# Patient Record
Sex: FEMALE | Race: WHITE | NOT HISPANIC OR LATINO
[De-identification: names, ages, dates, MRNs, and addresses within clinical notes are randomized per-mention and may not be internally consistent; named-entity substitution may affect disease eponyms.]

---

## 2017-01-04 ENCOUNTER — RX ONLY (OUTPATIENT)
Age: 42
Setting detail: RX ONLY
End: 2017-01-04

## 2017-01-06 ENCOUNTER — HOSPITAL ENCOUNTER (OUTPATIENT)
Dept: LAB | Facility: MEDICAL CENTER | Age: 42
End: 2017-01-06
Attending: NURSE PRACTITIONER
Payer: COMMERCIAL

## 2017-01-06 LAB
25(OH)D3 SERPL-MCNC: 48 NG/ML (ref 30–100)
ALBUMIN SERPL BCP-MCNC: 4.5 G/DL (ref 3.2–4.9)
ALBUMIN/GLOB SERPL: 1.7 G/DL
ALP SERPL-CCNC: 51 U/L (ref 30–99)
ALT SERPL-CCNC: 41 U/L (ref 2–50)
ANION GAP SERPL CALC-SCNC: 6 MMOL/L (ref 0–11.9)
APPEARANCE UR: CLEAR
AST SERPL-CCNC: 25 U/L (ref 12–45)
BASOPHILS # BLD AUTO: 0.1 K/UL (ref 0–0.12)
BASOPHILS NFR BLD AUTO: 1.2 % (ref 0–1.8)
BILIRUB SERPL-MCNC: 0.6 MG/DL (ref 0.1–1.5)
BILIRUB UR QL STRIP.AUTO: NEGATIVE
BUN SERPL-MCNC: 9 MG/DL (ref 8–22)
CALCIUM SERPL-MCNC: 9.8 MG/DL (ref 8.5–10.5)
CHLORIDE SERPL-SCNC: 103 MMOL/L (ref 96–112)
CHOLEST SERPL-MCNC: 255 MG/DL (ref 100–199)
CO2 SERPL-SCNC: 26 MMOL/L (ref 20–33)
COLOR UR AUTO: COLORLESS
CREAT SERPL-MCNC: 0.77 MG/DL (ref 0.5–1.4)
CULTURE IF INDICATED INDCX: NO UA CULTURE
EOSINOPHIL # BLD: 0.18 K/UL (ref 0–0.51)
EOSINOPHIL NFR BLD AUTO: 2.2 % (ref 0–6.9)
ERYTHROCYTE [DISTWIDTH] IN BLOOD BY AUTOMATED COUNT: 48.6 FL (ref 35.9–50)
GLOBULIN SER CALC-MCNC: 2.7 G/DL (ref 1.9–3.5)
GLUCOSE SERPL-MCNC: 93 MG/DL (ref 65–99)
GLUCOSE UR STRIP.AUTO-MCNC: NEGATIVE MG/DL
HCT VFR BLD AUTO: 44.5 % (ref 37–47)
HDLC SERPL-MCNC: 51 MG/DL
HGB BLD-MCNC: 14.7 G/DL (ref 12–16)
IMM GRANULOCYTES # BLD AUTO: 0.06 K/UL (ref 0–0.11)
IMM GRANULOCYTES NFR BLD AUTO: 0.7 % (ref 0–0.9)
KETONES UR STRIP.AUTO-MCNC: NEGATIVE MG/DL
LDLC SERPL CALC-MCNC: 173 MG/DL
LEUKOCYTE ESTERASE UR QL STRIP.AUTO: NEGATIVE
LYMPHOCYTES # BLD: 2.56 K/UL (ref 1–4.8)
LYMPHOCYTES NFR BLD AUTO: 31.3 % (ref 22–41)
MCH RBC QN AUTO: 30.1 PG (ref 27–33)
MCHC RBC AUTO-ENTMCNC: 33 G/DL (ref 33.6–35)
MCV RBC AUTO: 91 FL (ref 81.4–97.8)
MICRO URNS: NORMAL
MONOCYTES # BLD: 0.77 K/UL (ref 0–0.85)
MONOCYTES NFR BLD AUTO: 9.4 % (ref 0–13.4)
NEUTROPHILS # BLD: 4.52 K/UL (ref 2–7.15)
NEUTROPHILS NFR BLD AUTO: 55.2 % (ref 44–72)
NITRITE UR QL STRIP.AUTO: NEGATIVE
NRBC # BLD AUTO: 0 K/UL
NRBC BLD-RTO: 0 /100 WBC
PH UR: 6.5 [PH]
PLATELET # BLD AUTO: 220 K/UL (ref 164–446)
PMV BLD AUTO: 10.5 FL (ref 9–12.9)
POTASSIUM SERPL-SCNC: 4.1 MMOL/L (ref 3.6–5.5)
PROT SERPL-MCNC: 7.2 G/DL (ref 6–8.2)
PROT UR QL STRIP: NEGATIVE MG/DL
RBC # BLD AUTO: 4.89 M/UL (ref 4.2–5.4)
RBC UR QL AUTO: NEGATIVE
SODIUM SERPL-SCNC: 135 MMOL/L (ref 135–145)
SP GR UR STRIP.AUTO: 1
TRIGL SERPL-MCNC: 157 MG/DL (ref 0–149)
TSH SERPL DL<=0.005 MIU/L-ACNC: 0.72 UIU/ML (ref 0.3–3.7)
WBC # BLD AUTO: 8.2 K/UL (ref 4.8–10.8)

## 2017-01-06 PROCEDURE — 80061 LIPID PANEL: CPT

## 2017-01-06 PROCEDURE — 80053 COMPREHEN METABOLIC PANEL: CPT

## 2017-01-06 PROCEDURE — 85025 COMPLETE CBC W/AUTO DIFF WBC: CPT

## 2017-01-06 PROCEDURE — 82306 VITAMIN D 25 HYDROXY: CPT

## 2017-01-06 PROCEDURE — 81003 URINALYSIS AUTO W/O SCOPE: CPT

## 2017-01-06 PROCEDURE — 84443 ASSAY THYROID STIM HORMONE: CPT

## 2017-01-06 PROCEDURE — 36415 COLL VENOUS BLD VENIPUNCTURE: CPT

## 2017-07-17 ENCOUNTER — HOSPITAL ENCOUNTER (OUTPATIENT)
Dept: RADIOLOGY | Facility: MEDICAL CENTER | Age: 42
End: 2017-07-17
Attending: NURSE PRACTITIONER
Payer: COMMERCIAL

## 2017-07-17 DIAGNOSIS — Z12.31 ENCOUNTER FOR MAMMOGRAM TO ESTABLISH BASELINE MAMMOGRAM: ICD-10-CM

## 2017-07-17 PROCEDURE — 77063 BREAST TOMOSYNTHESIS BI: CPT

## 2017-11-06 ENCOUNTER — HOSPITAL ENCOUNTER (OUTPATIENT)
Dept: LAB | Facility: MEDICAL CENTER | Age: 42
End: 2017-11-06
Attending: SPECIALIST
Payer: COMMERCIAL

## 2017-11-06 LAB
ALBUMIN SERPL BCP-MCNC: 4.3 G/DL (ref 3.2–4.9)
ALBUMIN/GLOB SERPL: 1.5 G/DL
ALP SERPL-CCNC: 52 U/L (ref 30–99)
ALT SERPL-CCNC: 26 U/L (ref 2–50)
ANION GAP SERPL CALC-SCNC: 7 MMOL/L (ref 0–11.9)
AST SERPL-CCNC: 19 U/L (ref 12–45)
BASOPHILS # BLD AUTO: 1.1 % (ref 0–1.8)
BASOPHILS # BLD: 0.09 K/UL (ref 0–0.12)
BILIRUB SERPL-MCNC: 0.6 MG/DL (ref 0.1–1.5)
BUN SERPL-MCNC: 11 MG/DL (ref 8–22)
CALCIUM SERPL-MCNC: 9.4 MG/DL (ref 8.5–10.5)
CHLORIDE SERPL-SCNC: 104 MMOL/L (ref 96–112)
CO2 SERPL-SCNC: 24 MMOL/L (ref 20–33)
CREAT SERPL-MCNC: 0.73 MG/DL (ref 0.5–1.4)
EOSINOPHIL # BLD AUTO: 0.18 K/UL (ref 0–0.51)
EOSINOPHIL NFR BLD: 2.2 % (ref 0–6.9)
ERYTHROCYTE [DISTWIDTH] IN BLOOD BY AUTOMATED COUNT: 45.3 FL (ref 35.9–50)
GFR SERPL CREATININE-BSD FRML MDRD: >60 ML/MIN/1.73 M 2
GLOBULIN SER CALC-MCNC: 2.8 G/DL (ref 1.9–3.5)
GLUCOSE SERPL-MCNC: 109 MG/DL (ref 65–99)
HCT VFR BLD AUTO: 43.4 % (ref 37–47)
HGB BLD-MCNC: 14.4 G/DL (ref 12–16)
IMM GRANULOCYTES # BLD AUTO: 0.03 K/UL (ref 0–0.11)
IMM GRANULOCYTES NFR BLD AUTO: 0.4 % (ref 0–0.9)
LYMPHOCYTES # BLD AUTO: 2 K/UL (ref 1–4.8)
LYMPHOCYTES NFR BLD: 24.9 % (ref 22–41)
MCH RBC QN AUTO: 30.7 PG (ref 27–33)
MCHC RBC AUTO-ENTMCNC: 33.2 G/DL (ref 33.6–35)
MCV RBC AUTO: 92.5 FL (ref 81.4–97.8)
MONOCYTES # BLD AUTO: 0.56 K/UL (ref 0–0.85)
MONOCYTES NFR BLD AUTO: 7 % (ref 0–13.4)
NEUTROPHILS # BLD AUTO: 5.16 K/UL (ref 2–7.15)
NEUTROPHILS NFR BLD: 64.4 % (ref 44–72)
NRBC # BLD AUTO: 0 K/UL
NRBC BLD AUTO-RTO: 0 /100 WBC
PLATELET # BLD AUTO: 229 K/UL (ref 164–446)
PMV BLD AUTO: 11.2 FL (ref 9–12.9)
POTASSIUM SERPL-SCNC: 4.2 MMOL/L (ref 3.6–5.5)
PROT SERPL-MCNC: 7.1 G/DL (ref 6–8.2)
RBC # BLD AUTO: 4.69 M/UL (ref 4.2–5.4)
SODIUM SERPL-SCNC: 135 MMOL/L (ref 135–145)
WBC # BLD AUTO: 8 K/UL (ref 4.8–10.8)

## 2017-11-06 PROCEDURE — 36415 COLL VENOUS BLD VENIPUNCTURE: CPT

## 2017-11-06 PROCEDURE — 80053 COMPREHEN METABOLIC PANEL: CPT

## 2017-11-06 PROCEDURE — 85025 COMPLETE CBC W/AUTO DIFF WBC: CPT

## 2017-11-16 ENCOUNTER — HOSPITAL ENCOUNTER (OUTPATIENT)
Dept: RADIOLOGY | Facility: MEDICAL CENTER | Age: 42
End: 2017-11-16
Attending: SPECIALIST
Payer: COMMERCIAL

## 2017-11-16 DIAGNOSIS — G43.109 MIGRAINE WITH AURA AND WITHOUT STATUS MIGRAINOSUS, NOT INTRACTABLE: ICD-10-CM

## 2017-11-29 ENCOUNTER — HOSPITAL ENCOUNTER (OUTPATIENT)
Dept: RADIOLOGY | Facility: MEDICAL CENTER | Age: 42
End: 2017-11-29
Attending: SPECIALIST
Payer: COMMERCIAL

## 2017-11-29 PROCEDURE — A9579 GAD-BASE MR CONTRAST NOS,1ML: HCPCS | Performed by: SPECIALIST

## 2017-11-29 PROCEDURE — 70553 MRI BRAIN STEM W/O & W/DYE: CPT

## 2017-11-29 PROCEDURE — 700117 HCHG RX CONTRAST REV CODE 255: Performed by: SPECIALIST

## 2017-11-29 RX ADMIN — GADODIAMIDE 20 ML: 287 INJECTION INTRAVENOUS at 10:07

## 2018-01-17 ENCOUNTER — OFFICE VISIT (OUTPATIENT)
Dept: URGENT CARE | Facility: CLINIC | Age: 43
End: 2018-01-17
Payer: COMMERCIAL

## 2018-01-17 VITALS
HEART RATE: 84 BPM | WEIGHT: 230 LBS | BODY MASS INDEX: 34.86 KG/M2 | OXYGEN SATURATION: 97 % | SYSTOLIC BLOOD PRESSURE: 122 MMHG | HEIGHT: 68 IN | DIASTOLIC BLOOD PRESSURE: 76 MMHG | TEMPERATURE: 99 F

## 2018-01-17 DIAGNOSIS — J02.0 STREP THROAT: Primary | ICD-10-CM

## 2018-01-17 LAB
INT CON NEG: NEGATIVE
INT CON POS: POSITIVE
S PYO AG THROAT QL: POSITIVE

## 2018-01-17 PROCEDURE — 99214 OFFICE O/P EST MOD 30 MIN: CPT | Performed by: PHYSICIAN ASSISTANT

## 2018-01-17 PROCEDURE — 87880 STREP A ASSAY W/OPTIC: CPT | Performed by: PHYSICIAN ASSISTANT

## 2018-01-17 RX ORDER — IBUPROFEN 200 MG
400 TABLET ORAL EVERY 6 HOURS PRN
COMMUNITY

## 2018-01-17 RX ORDER — LISINOPRIL 20 MG/1
20 TABLET ORAL DAILY
COMMUNITY
End: 2019-01-23

## 2018-01-17 RX ORDER — AMOXICILLIN 875 MG/1
875 TABLET, COATED ORAL 2 TIMES DAILY
Qty: 20 TAB | Refills: 0 | Status: SHIPPED | OUTPATIENT
Start: 2018-01-17 | End: 2018-01-27

## 2018-01-17 NOTE — PATIENT INSTRUCTIONS
"Strep Throat  Strep throat is an infection of the throat caused by a bacteria named Streptococcus pyogenes. Your health care provider may call the infection streptococcal \"tonsillitis\" or \"pharyngitis\" depending on whether there are signs of inflammation in the tonsils or back of the throat. Strep throat is most common in children aged 5-15 years during the cold months of the year, but it can occur in people of any age during any season. This infection is spread from person to person (contagious) through coughing, sneezing, or other close contact.  SIGNS AND SYMPTOMS   · Fever or chills.  · Painful, swollen, red tonsils or throat.  · Pain or difficulty when swallowing.  · White or yellow spots on the tonsils or throat.  · Swollen, tender lymph nodes or \"glands\" of the neck or under the jaw.  · Red rash all over the body (rare).  DIAGNOSIS   Many different infections can cause the same symptoms. A test must be done to confirm the diagnosis so the right treatment can be given. A \"rapid strep test\" can help your health care provider make the diagnosis in a few minutes. If this test is not available, a light swab of the infected area can be used for a throat culture test. If a throat culture test is done, results are usually available in a day or two.  TREATMENT   Strep throat is treated with antibiotic medicine.  HOME CARE INSTRUCTIONS   · Gargle with 1 tsp of salt in 1 cup of warm water, 3-4 times per day or as needed for comfort.  · Family members who also have a sore throat or fever should be tested for strep throat and treated with antibiotics if they have the strep infection.  · Make sure everyone in your household washes their hands well.  · Do not share food, drinking cups, or personal items that could cause the infection to spread to others.  · You may need to eat a soft food diet until your sore throat gets better.  · Drink enough water and fluids to keep your urine clear or pale yellow. This will help prevent " dehydration.  · Get plenty of rest.  · Stay home from school, day care, or work until you have been on antibiotics for 24 hours.  · Take medicines only as directed by your health care provider.  · Take your antibiotic medicine as directed by your health care provider. Finish it even if you start to feel better.  SEEK MEDICAL CARE IF:   · The glands in your neck continue to enlarge.  · You develop a rash, cough, or earache.  · You cough up green, yellow-brown, or bloody sputum.  · You have pain or discomfort not controlled by medicines.  · Your problems seem to be getting worse rather than better.  · You have a fever.  SEEK IMMEDIATE MEDICAL CARE IF:   · You develop any new symptoms such as vomiting, severe headache, stiff or painful neck, chest pain, shortness of breath, or trouble swallowing.  · You develop severe throat pain, drooling, or changes in your voice.  · You develop swelling of the neck, or the skin on the neck becomes red and tender.  · You develop signs of dehydration, such as fatigue, dry mouth, and decreased urination.  · You become increasingly sleepy, or you cannot wake up completely.  MAKE SURE YOU:  · Understand these instructions.  · Will watch your condition.  · Will get help right away if you are not doing well or get worse.     This information is not intended to replace advice given to you by your health care provider. Make sure you discuss any questions you have with your health care provider.     Document Released: 12/15/2001 Document Revised: 01/08/2016 Document Reviewed: 04/11/2016  [employer] Interactive Patient Education ©2016 [employer]

## 2018-01-17 NOTE — PROGRESS NOTES
Subjective:      Pt is a 42 y.o. female who presents with URI (uri , sore throat x 3 days .)            HPI  PT presents to UC clinic today complaining of sore throat,  pressure in ears, cough, fatigue, runny nose. PT denies CP, SOB, NVD, abdominal pain, joint pain. PT states these symptoms began around 3 days ago. PT states the pain is a 7/10 with swallowing, aching in nature and worse at night. Pt has not taken any RX medications for this condition. The pt's medication list, problem list, and allergies have been evaluated and reviewed during today's visit.      PMH:  Past Medical History:   Diagnosis Date   • Bowel habit changes     diarrhea since end of May 2016 evaluted by primary and having a workup   • Heart burn    • High cholesterol    • Hypertension        PSH:  Past Surgical History:   Procedure Laterality Date   • SEPTOPLASTY Bilateral 6/17/2016    Procedure: SEPTOPLASTY;  Surgeon: Argelia Cheng M.D.;  Location: [facility];  Service:    • TURBINOPLASTY Bilateral 6/17/2016    Procedure: TURBINOPLASTY;  Surgeon: Argelia Cehng M.D.;  Location: [facility];  Service:    • ANTROSTOMY Bilateral 6/17/2016    Procedure: ANTROSTOMY - ENDOSCOPIC MAXILLARY;  Surgeon: Argelia Cheng M.D.;  Location: [facility];  Service:    • PRIMARY C SECTION  11/30/2011    Performed by DANIA PERSAUD at [facility]   • TONSILLECTOMY  1991       Fam Hx:  the patient's family history is not pertinent to their current complaint      Soc HX:  Social History     Social History   • Marital status:      Spouse name: N/A   • Number of children: N/A   • Years of education: N/A     Occupational History   • Not on file.     Social History Main Topics   • Smoking status: Never Smoker   • Smokeless tobacco: Never Used   • Alcohol use Yes      Comment: 4 per week   • Drug use: No   • Sexual activity: Not on file     Other Topics Concern   • Not on file     Social History  "Narrative   • No narrative on file         Medications:    Current Outpatient Prescriptions:   •  lisinopril (PRINIVIL) 20 MG Tab, Take 20 mg by mouth every day., Disp: , Rfl:   •  Nortriptyline HCl (PAMELOR PO), Take  by mouth., Disp: , Rfl:   •  ibuprofen (MOTRIN) 200 MG Tab, Take 200 mg by mouth every 6 hours as needed., Disp: , Rfl:   •  Multiple Vitamin (MULTI-VITAMIN PO), Take  by mouth., Disp: , Rfl:   •  Levonorgestrel (MIRENA, 52 MG, IU), by Intrauterine route., Disp: , Rfl:   •  amoxicillin (AMOXIL) 875 MG tablet, Take 1 Tab by mouth 2 times a day for 10 days., Disp: 20 Tab, Rfl: 0  •  labetalol (NORMODYNE) 100 MG Tab, Take 100 mg by mouth 2 times a day., Disp: , Rfl:   •  Cholecalciferol (VITAMIN D3) 5000 UNITS Cap, Take 1 Cap by mouth every day., Disp: , Rfl:   •  omeprazole (PRILOSEC) 20 MG delayed-release capsule, Take 20 mg by mouth every evening., Disp: , Rfl:   •  Cetirizine HCl (ZYRTEC PO), Take  by mouth every day., Disp: , Rfl:   •  Biotin 5000 MCG Cap, Take  by mouth every day., Disp: , Rfl:       Allergies:  Codeine    ROS   Constitutional: Positive for malaise/fatigue.   HENT: Positive for congestion and sore throat. Negative for ear pain.    Eyes: Negative for blurred vision, double vision and photophobia.   Respiratory: Negative for cough, hemoptysis, shortness of breath and wheezing.    Cardiovascular: Negative for chest pain and palpitations.   Gastrointestinal: Negative for nausea, vomiting, abdominal pain, diarrhea and constipation.   Genitourinary: Negative for dysuria and flank pain.   Musculoskeletal: Negative for falls and myalgias.   Skin: Negative for itching and rash.   Neurological:  Negative for dizziness and tingling.   Endo/Heme/Allergies: Does not bruise/bleed easily.   Psychiatric/Behavioral: Negative for depression. The patient is not nervous/anxious.               Objective:     /76   Pulse 84   Temp 37.2 °C (99 °F)   Ht 1.727 m (5' 8\")   Wt 104.3 kg (230 lb)   " LMP 01/17/2017   SpO2 97%   BMI 34.97 kg/m²      Physical Exam      Constitutional: PT is oriented to person, place, and time. PT appears well-developed and well-nourished. No distress.   HENT:   Head: Normocephalic and atraumatic.   Right Ear: Hearing, tympanic membrane, external ear and ear canal normal.   Left Ear: Hearing, tympanic membrane, external ear and ear canal normal.   Nose: Mucosal edema, rhinorrhea and sinus tenderness present. Right sinus exhibits frontal sinus tenderness. Left sinus exhibits frontal sinus tenderness.   Mouth/Throat: Uvula is midline. Mucous membranes are pale. Posterior oropharyngeal edema and posterior oropharyngeal erythema present. No oropharyngeal exudate.   Eyes: Conjunctivae normal and EOM are normal. Pupils are equal, round, and reactive to light.   Neck: Normal range of motion. Neck supple. No thyromegaly present.   Cardiovascular: Normal rate, regular rhythm, normal heart sounds and intact distal pulses.  Exam reveals no gallop and no friction rub.    No murmur heard.  Pulmonary/Chest: Effort normal and breath sounds normal. No respiratory distress. PT has no wheezes. PT has no rales. PT exhibits no tenderness.   Abdominal: Soft. Bowel sounds are normal. PT exhibits no distension and no mass. There is no tenderness. There is no rebound and no guarding.   Musculoskeletal: Normal range of motion. PT exhibits no edema and no tenderness.   Lymphadenopathy:     PT has no cervical adenopathy.   Neurological: PT is alert and oriented to person, place, and time. PT displays normal reflexes. No cranial nerve deficit. PT exhibits normal muscle tone. Coordination normal.   Skin: Skin is warm and dry. No rash noted. No erythema.   Psychiatric: PT has a normal mood and affect. PT behavior is normal. Judgment and thought content normal.          Assessment/Plan:     1. Strep throat    - amoxicillin (AMOXIL) 875 MG tablet; Take 1 Tab by mouth 2 times a day for 10 days.  Dispense: 20  Tab; Refill: 0  - POCT Rapid Strep A-->POS A    Rest, fluids encouraged.  OTC decongestant for congestion  Note given for work.  AVS with medical info given.  Pt was in full understanding and agreement with the plan.  Follow-up as needed if symptoms worsen or fail to improve.

## 2018-03-28 ENCOUNTER — OFFICE VISIT (OUTPATIENT)
Dept: NEUROLOGY | Facility: MEDICAL CENTER | Age: 43
End: 2018-03-28
Payer: COMMERCIAL

## 2018-03-28 VITALS
TEMPERATURE: 97.3 F | HEIGHT: 68 IN | DIASTOLIC BLOOD PRESSURE: 70 MMHG | WEIGHT: 249.78 LBS | BODY MASS INDEX: 37.86 KG/M2 | HEART RATE: 98 BPM | SYSTOLIC BLOOD PRESSURE: 120 MMHG

## 2018-03-28 PROCEDURE — 99204 OFFICE O/P NEW MOD 45 MIN: CPT | Performed by: PHYSICIAN ASSISTANT

## 2018-03-28 RX ORDER — NORTRIPTYLINE HYDROCHLORIDE 10 MG/1
10 CAPSULE ORAL 2 TIMES DAILY
Qty: 60 CAP | Refills: 0 | COMMUNITY
Start: 2018-03-28 | End: 2018-06-18

## 2018-03-28 NOTE — PROGRESS NOTES
"Subjective:      Reta Lam is a 42 y.o. female who presents with New Patient (Migraines)    Chief Complaint/Reason for referral:  headaches    History of Present Illness:   Describe your headaches to me:  Migraines since around age 20.  For awhile they thought it was jaw related but she now has a  etc and it didn't help much.    Bilateral onset, moderate to severe intensity, lasting more than 4 hours untreated, not necessarily worsened by activity but then she says \"it's just bad all the time.\"    Do you get an aura or any symptoms that typically begin PRIOR to headache onset?  Not generally    Are you nauseated or sick to your stomach when you have a headache?  y  Does light bother you when you have a headache?   ___y______  Does sound or noise bother you when you have a headache?   __n_______    Neurologic symptoms with headaches (weakness, numbness, vertigo, speech changes, cognitive changes)  none      Do you have any neck or jaw pain with headaches?    No neck injuries    Jaw addressed above    Have you identified any triggers for your headaches (dehydration, poor sleep, low blood sugar, alcohol 35% (germán wine) chocolate 22%, cheese 9%, citrus fruit 11%)? No triggers identified    Do you feel restless like you want to pace around with your headaches or do you feel like lying down to make your headache feel better/less severe?  Lie down    Do headaches start by coughing, sneezing, bending over, Valsalva maneuver, sexual activity?  none    Do headaches start shortly after you lie down to go to bed or shortly after you get up from bed in the morning? Typically come on in the mornings    Have you noticed a menstrual pattern to your headaches? No menses but she has her ovaries    Have you ever kept a headache diary?    How many days do you keep ANY type of headache in any given month?  3 or fewer days______   Between 3 and 6 days______   Between 6 and 10 days_____  Between 11 and 14 " days____  15 or more headache/days per month_X____  Has severe headaches __0__ days per month    Family members with headaches:  none    Co--morbid conditions:    Conditions that affect diagnosis and treatment:  Depression  N        Anxiety     N       Sleep disorders:   N       Obesity  Yes - dilated eye exam in December was fine    History of TBI N            Pregnancy/family planning   N  Counseled on teratogenicity of migraine medications.  Patient verbalized their understanding.    Fibromyalgia   N    Social History:  Do you drink any caffeine? Yes___X__ No____   How many days per week?  2 or fewer days______  3 or more days__X____    Do you drink alcohol?  Yes -     Do you use recreational drugs including medicinal marijuana?  no    Do you smoke cigarettes? 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  none    Who and where do you live?  [place] with  daughter age 6    What is your exercise program:  Walking about 2-3 days per week    Have you had an MRI done?   Yes - normal    PMH reviewed -   No asthma, no kidney stones, no mi, no stroke, no mood stabilizer    Medications and Allergies Reviewed     Lisinopril - sometimes helps with headache too      What are you taking right now for your headaches/#days per month of acute medication use:     Sumatriptan 100 mg        Prior acute treatments:  Medication/dose/timing/route/worked or side-effects?    none    What are you taking right now - daily - to prevent headaches?/dose    pamelor - much better with headaches now      Any prior prophylactic treatments:  Medications/dose/frequency/duration of treatment/worked or side effects?    topamax 25 mg daily - could not tolerate due to feeling too foggy                                                                                                                   HPI    ROS       Objective:     /70   Pulse 98    "Temp 36.3 °C (97.3 °F)   Ht 1.727 m (5' 8\")   Wt 113.3 kg (249 lb 12.5 oz)   BMI 37.98 kg/m²      Physical Exam   Constitutional: She is oriented to person, place, and time. She appears well-developed and well-nourished.   obese   HENT:   Head: Normocephalic.   Eyes: EOM are normal. Pupils are equal, round, and reactive to light.   Pulmonary/Chest: Effort normal.   Neurological: She is alert and oriented to person, place, and time. She displays normal reflexes. No cranial nerve deficit or sensory deficit. She exhibits normal muscle tone. Coordination normal.   Skin: Skin is warm and dry.   Psychiatric: She has a normal mood and affect. Her behavior is normal. Judgment and thought content normal.                   Assessment/Plan:     Migraine without aura/chronic migraine    Triptan - sumatriptan 100 mg prn    Daily Preventative Treatment:  Vitamins - handout provided - suggest she start one  Daily medicine - continue pamelor 20 mg daily              "

## 2018-06-18 RX ORDER — NORTRIPTYLINE HYDROCHLORIDE 10 MG/1
10 CAPSULE ORAL 4 TIMES DAILY
Qty: 120 CAP | Refills: 3 | Status: SHIPPED | OUTPATIENT
Start: 2018-06-18 | End: 2018-06-29

## 2018-06-26 RX ORDER — DEXAMETHASONE 4 MG/1
TABLET ORAL
Qty: 6 TAB | Refills: 1 | Status: SHIPPED | OUTPATIENT
Start: 2018-06-26 | End: 2019-01-23

## 2018-06-29 RX ORDER — NORTRIPTYLINE HYDROCHLORIDE 25 MG/1
25 CAPSULE ORAL 4 TIMES DAILY
Qty: 120 CAP | Refills: 11 | Status: SHIPPED | OUTPATIENT
Start: 2018-06-29 | End: 2019-01-23

## 2018-07-20 ENCOUNTER — HOSPITAL ENCOUNTER (OUTPATIENT)
Dept: RADIOLOGY | Facility: MEDICAL CENTER | Age: 43
End: 2018-07-20
Attending: OBSTETRICS & GYNECOLOGY
Payer: COMMERCIAL

## 2018-07-20 DIAGNOSIS — Z12.31 VISIT FOR SCREENING MAMMOGRAM: ICD-10-CM

## 2018-07-20 PROCEDURE — 77067 SCR MAMMO BI INCL CAD: CPT

## 2018-10-24 ENCOUNTER — APPOINTMENT (RX ONLY)
Dept: URBAN - METROPOLITAN AREA CLINIC 4 | Facility: CLINIC | Age: 43
Setting detail: DERMATOLOGY
End: 2018-10-24

## 2018-10-24 DIAGNOSIS — Z71.89 OTHER SPECIFIED COUNSELING: ICD-10-CM

## 2018-10-24 DIAGNOSIS — L30.9 DERMATITIS, UNSPECIFIED: ICD-10-CM

## 2018-10-24 PROBLEM — I10 ESSENTIAL (PRIMARY) HYPERTENSION: Status: ACTIVE | Noted: 2018-10-24

## 2018-10-24 PROBLEM — L57.0 ACTINIC KERATOSIS: Status: ACTIVE | Noted: 2018-10-24

## 2018-10-24 PROCEDURE — ? PRESCRIPTION

## 2018-10-24 PROCEDURE — ? MEDICATION COUNSELING

## 2018-10-24 PROCEDURE — 99212 OFFICE O/P EST SF 10 MIN: CPT

## 2018-10-24 PROCEDURE — ? ADDITIONAL NOTES

## 2018-10-24 PROCEDURE — ? COUNSELING

## 2018-10-24 PROCEDURE — ? DIAGNOSIS COMMENT

## 2018-10-24 RX ORDER — NYSTATIN AND TRIAMCINOLONE ACETONIDE 100000; 1 [USP'U]/G; MG/G
OINTMENT TOPICAL
Qty: 1 | Refills: 3 | Status: ERX | COMMUNITY
Start: 2018-10-24

## 2018-10-24 RX ADMIN — NYSTATIN AND TRIAMCINOLONE ACETONIDE: 100000; 1 OINTMENT TOPICAL at 00:00

## 2018-10-24 ASSESSMENT — LOCATION DETAILED DESCRIPTION DERM
LOCATION DETAILED: RIGHT AXILLARY VAULT
LOCATION DETAILED: LEFT AXILLARY VAULT
LOCATION DETAILED: STERNAL NOTCH

## 2018-10-24 ASSESSMENT — LOCATION SIMPLE DESCRIPTION DERM
LOCATION SIMPLE: CHEST
LOCATION SIMPLE: LEFT AXILLARY VAULT
LOCATION SIMPLE: RIGHT AXILLARY VAULT

## 2018-10-24 ASSESSMENT — LOCATION ZONE DERM
LOCATION ZONE: TRUNK
LOCATION ZONE: AXILLAE

## 2018-10-24 NOTE — HPI: RASH
What Type Of Note Output Would You Prefer (Optional)?: Standard Output
How Severe Is Your Rash?: moderate
Is This A New Presentation, Or A Follow-Up?: Rash
Additional History: Patient has had this previously 2-3 times in the past couple of years. She was given Nystatin from [facility] when this was first present 7 years ago, this did clear it up. She has tried the Nystatin now which just made it burn. She also tried neosporin and Aquaphor.

## 2018-10-24 NOTE — PROCEDURE: MEDICATION COUNSELING
Spironolactone Counseling: Patient advised regarding risks of diarrhea, abdominal pain, hyperkalemia, birth defects (for female patients), liver toxicity and renal toxicity. The patient may need blood work to monitor liver and kidney function and potassium levels while on therapy. The patient verbalized understanding of the proper use and possible adverse effects of spironolactone.  All of the patient's questions and concerns were addressed.
Enbrel Counseling:  I discussed with the patient the risks of etanercept including but not limited to myelosuppression, immunosuppression, autoimmune hepatitis, demyelinating diseases, lymphoma, and infections.  The patient understands that monitoring is required including a PPD at baseline and must alert us or the primary physician if symptoms of infection or other concerning signs are noted.
Cimzia Pregnancy And Lactation Text: This medication crosses the placenta but can be considered safe in certain situations. Cimzia may be excreted in breast milk.
Imiquimod Counseling:  I discussed with the patient the risks of imiquimod including but not limited to erythema, scaling, itching, weeping, crusting, and pain.  Patient understands that the inflammatory response to imiquimod is variable from person to person and was educated regarded proper titration schedule.  If flu-like symptoms develop, patient knows to discontinue the medication and contact us.
Stelara Pregnancy And Lactation Text: This medication is Pregnancy Category B and is considered safe during pregnancy. It is unknown if this medication is excreted in breast milk.
Benzoyl Peroxide Pregnancy And Lactation Text: This medication is Pregnancy Category C. It is unknown if benzoyl peroxide is excreted in breast milk.
Tazorac Pregnancy And Lactation Text: This medication is not safe during pregnancy. It is unknown if this medication is excreted in breast milk.
High Dose Vitamin A Pregnancy And Lactation Text: High dose vitamin A therapy is contraindicated during pregnancy and breast feeding.
SSKI Counseling:  I discussed with the patient the risks of SSKI including but not limited to thyroid abnormalities, metallic taste, GI upset, fever, headache, acne, arthralgias, paraesthesias, lymphadenopathy, easy bleeding, arrhythmias, and allergic reaction.
Protopic Counseling: Patient may experience a mild burning sensation during topical application. Protopic is not approved in children less than 2 years of age. There have been case reports of hematologic and skin malignancies in patients using topical calcineurin inhibitors although causality is questionable.
Cellcept Pregnancy And Lactation Text: This medication is Pregnancy Category D and isn't considered safe during pregnancy. It is unknown if this medication is excreted in breast milk.
Taltz Counseling: I discussed with the patient the risks of ixekizumab including but not limited to immunosuppression, serious infections, worsening of inflammatory bowel disease and drug reactions.  The patient understands that monitoring is required including a PPD at baseline and must alert us or the primary physician if symptoms of infection or other concerning signs are noted.
Tremfya Pregnancy And Lactation Text: The risk during pregnancy and breastfeeding is uncertain with this medication.
Hydroxychloroquine Pregnancy And Lactation Text: This medication has been shown to cause fetal harm but it isn't assigned a Pregnancy Risk Category. There are small amounts excreted in breast milk.
Cyclophosphamide Pregnancy And Lactation Text: This medication is Pregnancy Category D and it isn't considered safe during pregnancy. This medication is excreted in breast milk.
Carac Counseling:  I discussed with the patient the risks of Carac including but not limited to erythema, scaling, itching, weeping, crusting, and pain.
Cyclosporine Pregnancy And Lactation Text: This medication is Pregnancy Category C and it isn't know if it is safe during pregnancy. This medication is excreted in breast milk.
Infliximab Counseling:  I discussed with the patient the risks of infliximab including but not limited to myelosuppression, immunosuppression, autoimmune hepatitis, demyelinating diseases, lymphoma, and serious infections.  The patient understands that monitoring is required including a PPD at baseline and must alert us or the primary physician if symptoms of infection or other concerning signs are noted.
Dupixent Counseling: I discussed with the patient the risks of dupilumab including but not limited to eye infection and irritation, cold sores, injection site reactions, worsening of asthma, allergic reactions and increased risk of parasitic infection.  Live vaccines should be avoided while taking dupilumab. Dupilumab will also interact with certain medications such as warfarin and cyclosporine. The patient understands that monitoring is required and they must alert us or the primary physician if symptoms of infection or other concerning signs are noted.
Cimetidine Counseling:  I discussed with the patient the risks of Cimetidine including but not limited to gynecomastia, headache, diarrhea, nausea, drowsiness, arrhythmias, pancreatitis, skin rashes, psychosis, bone marrow suppression and kidney toxicity.
Zyclara Pregnancy And Lactation Text: This medication is Pregnancy Category C. It is unknown if this medication is excreted in breast milk.
Ketoconazole Pregnancy And Lactation Text: This medication is Pregnancy Category C and it isn't know if it is safe during pregnancy. It is also excreted in breast milk and breast feeding isn't recommended.
Ivermectin Counseling:  Patient instructed to take medication on an empty stomach with a full glass of water.  Patient informed of potential adverse effects including but not limited to nausea, diarrhea, dizziness, itching, and swelling of the extremities or lymph nodes.  The patient verbalized understanding of the proper use and possible adverse effects of ivermectin.  All of the patient's questions and concerns were addressed.
Rifampin Counseling: I discussed with the patient the risks of rifampin including but not limited to liver damage, kidney damage, red-orange body fluids, nausea/vomiting and severe allergy.
Dapsone Counseling: I discussed with the patient the risks of dapsone including but not limited to hemolytic anemia, agranulocytosis, rashes, methemoglobinemia, kidney failure, peripheral neuropathy, headaches, GI upset, and liver toxicity.  Patients who start dapsone require monitoring including baseline LFTs and weekly CBCs for the first month, then every month thereafter.  The patient verbalized understanding of the proper use and possible adverse effects of dapsone.  All of the patient's questions and concerns were addressed.
Nsaids Pregnancy And Lactation Text: These medications are considered safe up to 30 weeks gestation. It is excreted in breast milk.
Dapsone Pregnancy And Lactation Text: This medication is Pregnancy Category C and is not considered safe during pregnancy or breast feeding.
Rituxan Pregnancy And Lactation Text: This medication is Pregnancy Category C and it isn't know if it is safe during pregnancy. It is unknown if this medication is excreted in breast milk but similar antibodies are known to be excreted.
Gabapentin Pregnancy And Lactation Text: This medication is Pregnancy Category C and isn't considered safe during pregnancy. It is excreted in breast milk.
Otezla Counseling: The side effects of Otezla were discussed with the patient, including but not limited to worsening or new depression, weight loss, diarrhea, nausea, upper respiratory tract infection, and headache. Patient instructed to call the office should any adverse effect occur.  The patient verbalized understanding of the proper use and possible adverse effects of Otezla.  All the patient's questions and concerns were addressed.
Eucrisa Pregnancy And Lactation Text: This medication has not been assigned a Pregnancy Risk Category but animal studies failed to show danger with the topical medication. It is unknown if the medication is excreted in breast milk.
Odomzo Pregnancy And Lactation Text: This medication is Pregnancy Category X and is absolutely contraindicated during pregnancy. It is unknown if it is excreted in breast milk.
Minocycline Counseling: Patient advised regarding possible photosensitivity and discoloration of the teeth, skin, lips, tongue and gums.  Patient instructed to avoid sunlight, if possible.  When exposed to sunlight, patients should wear protective clothing, sunglasses, and sunscreen.  The patient was instructed to call the office immediately if the following severe adverse effects occur:  hearing changes, easy bruising/bleeding, severe headache, or vision changes.  The patient verbalized understanding of the proper use and possible adverse effects of minocycline.  All of the patient's questions and concerns were addressed.
Rifampin Pregnancy And Lactation Text: This medication is Pregnancy Category C and it isn't know if it is safe during pregnancy. It is also excreted in breast milk and should not be used if you are breast feeding.
Valtrex Pregnancy And Lactation Text: this medication is Pregnancy Category B and is considered safe during pregnancy. This medication is not directly found in breast milk but it's metabolite acyclovir is present.
Terbinafine Pregnancy And Lactation Text: This medication is Pregnancy Category B and is considered safe during pregnancy. It is also excreted in breast milk and breast feeding isn't recommended.
Itraconazole Counseling:  I discussed with the patient the risks of itraconazole including but not limited to liver damage, nausea/vomiting, neuropathy, and severe allergy.  The patient understands that this medication is best absorbed when taken with acidic beverages such as non-diet cola or ginger ale.  The patient understands that monitoring is required including baseline LFTs and repeat LFTs at intervals.  The patient understands that they are to contact us or the primary physician if concerning signs are noted.
Methotrexate Pregnancy And Lactation Text: This medication is Pregnancy Category X and is known to cause fetal harm. This medication is excreted in breast milk.
Albendazole Counseling:  I discussed with the patient the risks of albendazole including but not limited to cytopenia, kidney damage, nausea/vomiting and severe allergy.  The patient understands that this medication is being used in an off-label manner.
Cephalexin Pregnancy And Lactation Text: This medication is Pregnancy Category B and considered safe during pregnancy.  It is also excreted in breast milk but can be used safely for shorter doses.
Drysol Counseling:  I discussed with the patient the risks of drysol/aluminum chloride including but not limited to skin rash, itching, irritation, burning.
Eucrisa Counseling: Patient may experience a mild burning sensation during topical application. Eucrisa is not approved in children less than 2 years of age.
Itraconazole Pregnancy And Lactation Text: This medication is Pregnancy Category C and it isn't know if it is safe during pregnancy. It is also excreted in breast milk.
Cimzia Counseling:  I discussed with the patient the risks of Cimzia including but not limited to immunosuppression, allergic reactions and infections.  The patient understands that monitoring is required including a PPD at baseline and must alert us or the primary physician if symptoms of infection or other concerning signs are noted.
Prednisone Counseling:  I discussed with the patient the risks of prolonged use of prednisone including but not limited to weight gain, insomnia, osteoporosis, mood changes, diabetes, susceptibility to infection, glaucoma and high blood pressure.  In cases where prednisone use is prolonged, patients should be monitored with blood pressure checks, serum glucose levels and an eye exam.  Additionally, the patient may need to be placed on GI prophylaxis, PCP prophylaxis, and calcium and vitamin D supplementation and/or a bisphosphonate.  The patient verbalized understanding of the proper use and the possible adverse effects of prednisone.  All of the patient's questions and concerns were addressed.
Bactrim Pregnancy And Lactation Text: This medication is Pregnancy Category D and is known to cause fetal risk.  It is also excreted in breast milk.
Cosentyx Counseling:  I discussed with the patient the risks of Cosentyx including but not limited to worsening of Crohn's disease, immunosuppression, allergic reactions and infections.  The patient understands that monitoring is required including a PPD at baseline and must alert us or the primary physician if symptoms of infection or other concerning signs are noted.
Protopic Pregnancy And Lactation Text: This medication is Pregnancy Category C. It is unknown if this medication is excreted in breast milk when applied topically.
Topical Sulfur Applications Counseling: Topical Sulfur Counseling: Patient counseled that this medication may cause skin irritation or allergic reactions.  In the event of skin irritation, the patient was advised to reduce the amount of the drug applied or use it less frequently.   The patient verbalized understanding of the proper use and possible adverse effects of topical sulfur application.  All of the patient's questions and concerns were addressed.
Solaraze Pregnancy And Lactation Text: This medication is Pregnancy Category B and is considered safe. There is some data to suggest avoiding during the third trimester. It is unknown if this medication is excreted in breast milk.
Metronidazole Counseling:  I discussed with the patient the risks of metronidazole including but not limited to seizures, nausea/vomiting, a metallic taste in the mouth, nausea/vomiting and severe allergy.
Siliq Counseling:  I discussed with the patient the risks of Siliq including but not limited to new or worsening depression, suicidal thoughts and behavior, immunosuppression, malignancy, posterior leukoencephalopathy syndrome, and serious infections.  The patient understands that monitoring is required including a PPD at baseline and must alert us or the primary physician if symptoms of infection or other concerning signs are noted. There is also a special program designed to monitor depression which is required with Siliq.
Xolair Pregnancy And Lactation Text: This medication is Pregnancy Category B and is considered safe during pregnancy. This medication is excreted in breast milk.
Doxepin Pregnancy And Lactation Text: This medication is Pregnancy Category C and it isn't known if it is safe during pregnancy. It is also excreted in breast milk and breast feeding isn't recommended.
5-Fu Pregnancy And Lactation Text: This medication is Pregnancy Category X and contraindicated in pregnancy and in women who may become pregnant. It is unknown if this medication is excreted in breast milk.
Hydroquinone Counseling:  Patient advised that medication may result in skin irritation, lightening (hypopigmentation), dryness, and burning.  In the event of skin irritation, the patient was advised to reduce the amount of the drug applied or use it less frequently.  Rarely, spots that are treated with hydroquinone can become darker (pseudoochronosis).  Should this occur, patient instructed to stop medication and call the office. The patient verbalized understanding of the proper use and possible adverse effects of hydroquinone.  All of the patient's questions and concerns were addressed.
Zyclara Counseling:  I discussed with the patient the risks of imiquimod including but not limited to erythema, scaling, itching, weeping, crusting, and pain.  Patient understands that the inflammatory response to imiquimod is variable from person to person and was educated regarded proper titration schedule.  If flu-like symptoms develop, patient knows to discontinue the medication and contact us.
Erythromycin Counseling:  I discussed with the patient the risks of erythromycin including but not limited to GI upset, allergic reaction, drug rash, diarrhea, increase in liver enzymes, and yeast infections.
High Dose Vitamin A Counseling: Side effects reviewed, pt to contact office should one occur.
Include Pregnancy/Lactation Warning?: No
Azithromycin Pregnancy And Lactation Text: This medication is considered safe during pregnancy and is also secreted in breast milk.
Picato Counseling:  I discussed with the patient the risks of Picato including but not limited to erythema, scaling, itching, weeping, crusting, and pain.
Dupixent Pregnancy And Lactation Text: This medication likely crosses the placenta but the risk for the fetus is uncertain. This medication is excreted in breast milk.
Bexarotene Pregnancy And Lactation Text: This medication is Pregnancy Category X and should not be given to women who are pregnant or may become pregnant. This medication should not be used if you are breast feeding.
Hydroxyzine Pregnancy And Lactation Text: This medication is not safe during pregnancy and should not be taken. It is also excreted in breast milk and breast feeding isn't recommended.
Terbinafine Counseling: Patient counseling regarding adverse effects of terbinafine including but not limited to headache, diarrhea, rash, upset stomach, liver function test abnormalities, itching, taste/smell disturbance, nausea, abdominal pain, and flatulence.  There is a rare possibility of liver failure that can occur when taking terbinafine.  The patient understands that a baseline LFT and kidney function test may be required. The patient verbalized understanding of the proper use and possible adverse effects of terbinafine.  All of the patient's questions and concerns were addressed.
Birth Control Pills Counseling: Birth Control Pill Counseling: I discussed with the patient the potential side effects of OCPs including but not limited to increased risk of stroke, heart attack, thrombophlebitis, deep venous thrombosis, hepatic adenomas, breast changes, GI upset, headaches, and depression.  The patient verbalized understanding of the proper use and possible adverse effects of OCPs. All of the patient's questions and concerns were addressed.
Stelara Counseling:  I discussed with the patient the risks of ustekinumab including but not limited to immunosuppression, malignancy, posterior leukoencephalopathy syndrome, and serious infections.  The patient understands that monitoring is required including a PPD at baseline and must alert us or the primary physician if symptoms of infection or other concerning signs are noted.
Benzoyl Peroxide Counseling: Patient counseled that medicine may cause skin irritation and bleach clothing.  In the event of skin irritation, the patient was advised to reduce the amount of the drug applied or use it less frequently.   The patient verbalized understanding of the proper use and possible adverse effects of benzoyl peroxide.  All of the patient's questions and concerns were addressed.
Arava Counseling:  Patient counseled regarding adverse effects of Arava including but not limited to nausea, vomiting, abnormalities in liver function tests. Patients may develop mouth sores, rash, diarrhea, and abnormalities in blood counts. The patient understands that monitoring is required including LFTs and blood counts.  There is a rare possibility of scarring of the liver and lung problems that can occur when taking methotrexate. Persistent nausea, loss of appetite, pale stools, dark urine, cough, and shortness of breath should be reported immediately. Patient advised to discontinue Arava treatment and consult with a physician prior to attempting conception. The patient will have to undergo a treatment to eliminate Arava from the body prior to conception.
Spironolactone Pregnancy And Lactation Text: This medication can cause feminization of the male fetus and should be avoided during pregnancy. The active metabolite is also found in breast milk.
Nsaids Counseling: NSAID Counseling: I discussed with the patient that NSAIDs should be taken with food. Prolonged use of NSAIDs can result in the development of stomach ulcers.  Patient advised to stop taking NSAIDs if abdominal pain occurs.  The patient verbalized understanding of the proper use and possible adverse effects of NSAIDs.  All of the patient's questions and concerns were addressed.
Clindamycin Counseling: I counseled the patient regarding use of clindamycin as an antibiotic for prophylactic and/or therapeutic purposes. Clindamycin is active against numerous classes of bacteria, including skin bacteria. Side effects may include nausea, diarrhea, gastrointestinal upset, rash, hives, yeast infections, and in rare cases, colitis.
Topical Sulfur Applications Pregnancy And Lactation Text: This medication is Pregnancy Category C and has an unknown safety profile during pregnancy. It is unknown if this topical medication is excreted in breast milk.
Acitretin Pregnancy And Lactation Text: This medication is Pregnancy Category X and should not be given to women who are pregnant or may become pregnant in the future. This medication is excreted in breast milk.
Griseofulvin Pregnancy And Lactation Text: This medication is Pregnancy Category X and is known to cause serious birth defects. It is unknown if this medication is excreted in breast milk but breast feeding should be avoided.
Humira Counseling:  I discussed with the patient the risks of adalimumab including but not limited to myelosuppression, immunosuppression, autoimmune hepatitis, demyelinating diseases, lymphoma, and serious infections.  The patient understands that monitoring is required including a PPD at baseline and must alert us or the primary physician if symptoms of infection or other concerning signs are noted.
Cyclosporine Counseling:  I discussed with the patient the risks of cyclosporine including but not limited to hypertension, gingival hyperplasia,myelosuppression, immunosuppression, liver damage, kidney damage, neurotoxicity, lymphoma, and serious infections. The patient understands that monitoring is required including baseline blood pressure, CBC, CMP, lipid panel and uric acid, and then 1-2 times monthly CMP and blood pressure.
Ketoconazole Counseling:   Patient counseled regarding improving absorption with orange juice.  Adverse effects include but are not limited to breast enlargement, headache, diarrhea, nausea, upset stomach, liver function test abnormalities, taste disturbance, and stomach pain.  There is a rare possibility of liver failure that can occur when taking ketoconazole. The patient understands that monitoring of LFTs may be required, especially at baseline. The patient verbalized understanding of the proper use and possible adverse effects of ketoconazole.  All of the patient's questions and concerns were addressed.
Azithromycin Counseling:  I discussed with the patient the risks of azithromycin including but not limited to GI upset, allergic reaction, drug rash, diarrhea, and yeast infections.
Glycopyrrolate Pregnancy And Lactation Text: This medication is Pregnancy Category B and is considered safe during pregnancy. It is unknown if it is excreted breast milk.
Topical Clindamycin Counseling: Patient counseled that this medication may cause skin irritation or allergic reactions.  In the event of skin irritation, the patient was advised to reduce the amount of the drug applied or use it less frequently.   The patient verbalized understanding of the proper use and possible adverse effects of clindamycin.  All of the patient's questions and concerns were addressed.
Albendazole Pregnancy And Lactation Text: This medication is Pregnancy Category C and it isn't known if it is safe during pregnancy. It is also excreted in breast milk.
Doxycycline Counseling:  Patient counseled regarding possible photosensitivity and increased risk for sunburn.  Patient instructed to avoid sunlight, if possible.  When exposed to sunlight, patients should wear protective clothing, sunglasses, and sunscreen.  The patient was instructed to call the office immediately if the following severe adverse effects occur:  hearing changes, easy bruising/bleeding, severe headache, or vision changes.  The patient verbalized understanding of the proper use and possible adverse effects of doxycycline.  All of the patient's questions and concerns were addressed.
Rituxan Counseling:  I discussed with the patient the risks of Rituxan infusions. Side effects can include infusion reactions, severe drug rashes including mucocutaneous reactions, reactivation of latent hepatitis and other infections and rarely progressive multifocal leukoencephalopathy.  All of the patient's questions and concerns were addressed.
Birth Control Pills Pregnancy And Lactation Text: This medication should be avoided if pregnant and for the first 30 days post-partum.
Erivedge Counseling- I discussed with the patient the risks of Erivedge including but not limited to nausea, vomiting, diarrhea, constipation, weight loss, changes in the sense of taste, decreased appetite, muscle spasms, and hair loss.  The patient verbalized understanding of the proper use and possible adverse effects of Erivedge.  All of the patient's questions and concerns were addressed.
Griseofulvin Counseling:  I discussed with the patient the risks of griseofulvin including but not limited to photosensitivity, cytopenia, liver damage, nausea/vomiting and severe allergy.  The patient understands that this medication is best absorbed when taken with a fatty meal (e.g., ice cream or french fries).
Colchicine Counseling:  Patient counseled regarding adverse effects including but not limited to stomach upset (nausea, vomiting, stomach pain, or diarrhea).  Patient instructed to limit alcohol consumption while taking this medication.  Colchicine may reduce blood counts especially with prolonged use.  The patient understands that monitoring of kidney function and blood counts may be required, especially at baseline. The patient verbalized understanding of the proper use and possible adverse effects of colchicine.  All of the patient's questions and concerns were addressed.
Methotrexate Counseling:  Patient counseled regarding adverse effects of methotrexate including but not limited to nausea, vomiting, abnormalities in liver function tests. Patients may develop mouth sores, rash, diarrhea, and abnormalities in blood counts. The patient understands that monitoring is required including LFT's and blood counts.  There is a rare possibility of scarring of the liver and lung problems that can occur when taking methotrexate. Persistent nausea, loss of appetite, pale stools, dark urine, cough, and shortness of breath should be reported immediately. Patient advised to discontinue methotrexate treatment at least three months before attempting to become pregnant.  I discussed the need for folate supplements while taking methotrexate.  These supplements can decrease side effects during methotrexate treatment. The patient verbalized understanding of the proper use and possible adverse effects of methotrexate.  All of the patient's questions and concerns were addressed.
Clofazimine Counseling:  I discussed with the patient the risks of clofazimine including but not limited to skin and eye pigmentation, liver damage, nausea/vomiting, gastrointestinal bleeding and allergy.
Tetracycline Pregnancy And Lactation Text: This medication is Pregnancy Category D and not consider safe during pregnancy. It is also excreted in breast milk.
Topical Retinoid counseling:  Patient advised to apply a pea-sized amount only at bedtime and wait 30 minutes after washing their face before applying.  If too drying, patient may add a non-comedogenic moisturizer. The patient verbalized understanding of the proper use and possible adverse effects of retinoids.  All of the patient's questions and concerns were addressed.
Odomzo Counseling- I discussed with the patient the risks of Odomzo including but not limited to nausea, vomiting, diarrhea, constipation, weight loss, changes in the sense of taste, decreased appetite, muscle spasms, and hair loss.  The patient verbalized understanding of the proper use and possible adverse effects of Odomzo.  All of the patient's questions and concerns were addressed.
Xeljanz Counseling: I discussed with the patient the risks of Xeljanz therapy including increased risk of infection, liver issues, headache, diarrhea, or cold symptoms. Live vaccines should be avoided. They were instructed to call if they have any problems.
Xolair Counseling:  Patient informed of potential adverse effects including but not limited to fever, muscle aches, rash and allergic reactions.  The patient verbalized understanding of the proper use and possible adverse effects of Xolair.  All of the patient's questions and concerns were addressed.
Isotretinoin Counseling: Patient should get monthly blood tests, not donate blood, not drive at night if vision affected, not share medication, and not undergo elective surgery for 6 months after tx completed. Side effects reviewed, pt to contact office should one occur.
Solaraze Counseling:  I discussed with the patient the risks of Solaraze including but not limited to erythema, scaling, itching, weeping, crusting, and pain.
Tazorac Counseling:  Patient advised that medication is irritating and drying.  Patient may need to apply sparingly and wash off after an hour before eventually leaving it on overnight.  The patient verbalized understanding of the proper use and possible adverse effects of tazorac.  All of the patient's questions and concerns were addressed.
Oxybutynin Counseling:  I discussed with the patient the risks of oxybutynin including but not limited to skin rash, drowsiness, dry mouth, difficulty urinating, and blurred vision.
Cephalexin Counseling: I counseled the patient regarding use of cephalexin as an antibiotic for prophylactic and/or therapeutic purposes. Cephalexin (commonly prescribed under brand name Keflex) is a cephalosporin antibiotic which is active against numerous classes of bacteria, including most skin bacteria. Side effects may include nausea, diarrhea, gastrointestinal upset, rash, hives, yeast infections, and in rare cases, hepatitis, kidney disease, seizures, fever, confusion, neurologic symptoms, and others. Patients with severe allergies to penicillin medications are cautioned that there is about a 10% incidence of cross-reactivity with cephalosporins. When possible, patients with penicillin allergies should use alternatives to cephalosporins for antibiotic therapy.
Bactrim Counseling:  I discussed with the patient the risks of sulfa antibiotics including but not limited to GI upset, allergic reaction, drug rash, diarrhea, dizziness, photosensitivity, and yeast infections.  Rarely, more serious reactions can occur including but not limited to aplastic anemia, agranulocytosis, methemoglobinemia, blood dyscrasias, liver or kidney failure, lung infiltrates or desquamative/blistering drug rashes.
Tetracycline Counseling: Patient counseled regarding possible photosensitivity and increased risk for sunburn.  Patient instructed to avoid sunlight, if possible.  When exposed to sunlight, patients should wear protective clothing, sunglasses, and sunscreen.  The patient was instructed to call the office immediately if the following severe adverse effects occur:  hearing changes, easy bruising/bleeding, severe headache, or vision changes.  The patient verbalized understanding of the proper use and possible adverse effects of tetracycline.  All of the patient's questions and concerns were addressed. Patient understands to avoid pregnancy while on therapy due to potential birth defects.
Azathioprine Counseling:  I discussed with the patient the risks of azathioprine including but not limited to myelosuppression, immunosuppression, hepatotoxicity, lymphoma, and infections.  The patient understands that monitoring is required including baseline LFTs, Creatinine, possible TPMP genotyping and weekly CBCs for the first month and then every 2 weeks thereafter.  The patient verbalized understanding of the proper use and possible adverse effects of azathioprine.  All of the patient's questions and concerns were addressed.
Bexarotene Counseling:  I discussed with the patient the risks of bexarotene including but not limited to hair loss, dry lips/skin/eyes, liver abnormalities, hyperlipidemia, pancreatitis, depression/suicidal ideation, photosensitivity, drug rash/allergic reactions, hypothyroidism, anemia, leukopenia, infection, cataracts, and teratogenicity.  Patient understands that they will need regular blood tests to check lipid profile, liver function tests, white blood cell count, thyroid function tests and pregnancy test if applicable.
Tremfya Counseling: I discussed with the patient the risks of guselkumab including but not limited to immunosuppression, serious infections, worsening of inflammatory bowel disease and drug reactions.  The patient understands that monitoring is required including a PPD at baseline and must alert us or the primary physician if symptoms of infection or other concerning signs are noted.
Detail Level: Simple
Drysol Pregnancy And Lactation Text: This medication is considered safe during pregnancy and breast feeding.
Quinolones Counseling:  I discussed with the patient the risks of fluoroquinolones including but not limited to GI upset, allergic reaction, drug rash, diarrhea, dizziness, photosensitivity, yeast infections, liver function test abnormalities, tendonitis/tendon rupture.
Elidel Counseling: Patient may experience a mild burning sensation during topical application. Elidel is not approved in children less than 2 years of age. There have been case reports of hematologic and skin malignancies in patients using topical calcineurin inhibitors although causality is questionable.
Doxepin Counseling:  Patient advised that the medication is sedating and not to drive a car after taking this medication. Patient informed of potential adverse effects including but not limited to dry mouth, urinary retention, and blurry vision.  The patient verbalized understanding of the proper use and possible adverse effects of doxepin.  All of the patient's questions and concerns were addressed.
Erythromycin Pregnancy And Lactation Text: This medication is Pregnancy Category B and is considered safe during pregnancy. It is also excreted in breast milk.
Isotretinoin Pregnancy And Lactation Text: This medication is Pregnancy Category X and is considered extremely dangerous during pregnancy. It is unknown if it is excreted in breast milk.
Sski Pregnancy And Lactation Text: This medication is Pregnancy Category D and isn't considered safe during pregnancy. It is excreted in breast milk.
Glycopyrrolate Counseling:  I discussed with the patient the risks of glycopyrrolate including but not limited to skin rash, drowsiness, dry mouth, difficulty urinating, and blurred vision.
Acitretin Counseling:  I discussed with the patient the risks of acitretin including but not limited to hair loss, dry lips/skin/eyes, liver damage, hyperlipidemia, depression/suicidal ideation, photosensitivity.  Serious rare side effects can include but are not limited to pancreatitis, pseudotumor cerebri, bony changes, clot formation/stroke/heart attack.  Patient understands that alcohol is contraindicated since it can result in liver toxicity and significantly prolong the elimination of the drug by many years.
Gabapentin Counseling: I discussed with the patient the risks of gabapentin including but not limited to dizziness, somnolence, fatigue and ataxia.
Thalidomide Counseling: I discussed with the patient the risks of thalidomide including but not limited to birth defects, anxiety, weakness, chest pain, dizziness, cough and severe allergy.
Hydroxychloroquine Counseling:  I discussed with the patient that a baseline ophthalmologic exam is needed at the start of therapy and every year thereafter while on therapy. A CBC may also be warranted for monitoring.  The side effects of this medication were discussed with the patient, including but not limited to agranulocytosis, aplastic anemia, seizures, rashes, retinopathy, and liver toxicity. Patient instructed to call the office should any adverse effect occur.  The patient verbalized understanding of the proper use and possible adverse effects of Plaquenil.  All the patient's questions and concerns were addressed.
Minoxidil Counseling: Minoxidil is a topical medication which can increase blood flow where it is applied. It is uncertain how this medication increases hair growth. Side effects are uncommon and include stinging and allergic reactions.
Doxycycline Pregnancy And Lactation Text: This medication is Pregnancy Category D and not consider safe during pregnancy. It is also excreted in breast milk but is considered safe for shorter treatment courses.
Topical Clindamycin Pregnancy And Lactation Text: This medication is Pregnancy Category B and is considered safe during pregnancy. It is unknown if it is excreted in breast milk.
Otezla Pregnancy And Lactation Text: This medication is Pregnancy Category C and it isn't known if it is safe during pregnancy. It is unknown if it is excreted in breast milk.
Cyclophosphamide Counseling:  I discussed with the patient the risks of cyclophosphamide including but not limited to hair loss, hormonal abnormalities, decreased fertility, abdominal pain, diarrhea, nausea and vomiting, bone marrow suppression and infection. The patient understands that monitoring is required while taking this medication.
Xelkenaz Pregnancy And Lactation Text: This medication is Pregnancy Category D and is not considered safe during pregnancy.  The risk during breast feeding is also uncertain.
Clindamycin Pregnancy And Lactation Text: This medication can be used in pregnancy if certain situations. Clindamycin is also present in breast milk.
Valtrex Counseling: I discussed with the patient the risks of valacyclovir including but not limited to kidney damage, nausea, vomiting and severe allergy.  The patient understands that if the infection seems to be worsening or is not improving, they are to call.
Fluconazole Counseling:  Patient counseled regarding adverse effects of fluconazole including but not limited to headache, diarrhea, nausea, upset stomach, liver function test abnormalities, taste disturbance, and stomach pain.  There is a rare possibility of liver failure that can occur when taking fluconazole.  The patient understands that monitoring of LFTs and kidney function test may be required, especially at baseline. The patient verbalized understanding of the proper use and possible adverse effects of fluconazole.  All of the patient's questions and concerns were addressed.
Simponi Counseling:  I discussed with the patient the risks of golimumab including but not limited to myelosuppression, immunosuppression, autoimmune hepatitis, demyelinating diseases, lymphoma, and serious infections.  The patient understands that monitoring is required including a PPD at baseline and must alert us or the primary physician if symptoms of infection or other concerning signs are noted.
5-Fu Counseling: 5-Fluorouracil Counseling:  I discussed with the patient the risks of 5-fluorouracil including but not limited to erythema, scaling, itching, weeping, crusting, and pain.
Metronidazole Pregnancy And Lactation Text: This medication is Pregnancy Category B and considered safe during pregnancy.  It is also excreted in breast milk.
Cellcept Counseling:  I discussed with the patient the risks of mycophenolate mofetil including but not limited to infection/immunosuppression, GI upset, hypokalemia, hypercholesterolemia, bone marrow suppression, lymphoproliferative disorders, malignancy, GI ulceration/bleed/perforation, colitis, interstitial lung disease, kidney failure, progressive multifocal leukoencephalopathy, and birth defects.  The patient understands that monitoring is required including a baseline creatinine and regular CBC testing. In addition, patient must alert us immediately if symptoms of infection or other concerning signs are noted.
Hydroxyzine Counseling: Patient advised that the medication is sedating and not to drive a car after taking this medication.  Patient informed of potential adverse effects including but not limited to dry mouth, urinary retention, and blurry vision.  The patient verbalized understanding of the proper use and possible adverse effects of hydroxyzine.  All of the patient's questions and concerns were addressed.

## 2018-10-24 NOTE — PROCEDURE: ADDITIONAL NOTES
Detail Level: Zone
Additional Notes: Patient advised to discontinue use of current deodorant on the affected area. D/C neosporin\\nKeep area clean with gentle nonfragranced cleanser\\nFragrance free and dye free protocol discussed

## 2018-10-25 ENCOUNTER — TELEPHONE (OUTPATIENT)
Dept: NEUROLOGY | Facility: MEDICAL CENTER | Age: 43
End: 2018-10-25

## 2018-10-25 NOTE — TELEPHONE ENCOUNTER
----- Message from Reta Lam sent at 10/23/2018  8:38 PM PDT -----  Regarding: RE: Prescription Question  Contact: [phone]  Aniket Palacios!  Thanks so much for getting back to me! I tried to call yesterday but  the voicemail said if appointments are needed, to call the main number. I wasn’t able to call! I was thinking though, when Linnette and I were communicating in the summer, she indicated she would change my medication to a cousin of Pamelor. I think you can see those emails? I can!     If Linnette absolutely wants me to come in, I will, but I wondered if all of our communication in the  summer would jog her memory about next steps? If I need to come in, I’m hoping not to wait too long for an appointment! I know how busy your practice is!!    Thanks again! My cell phone number [phone], if you want to talk!   Reta   ----- Message -----  From: Aram Weaver Ass't  Sent: 10/22/2018  9:26 AM PDT  To: Reta Lam  Subject: RE: Prescription Question  Good morning Reta,    I am sorry to hear this, Since your symptoms fail to improve you will have to schedule a follow up visit with Millicent to discuss other options medication wise. Give me a call today at [phone] so we can set you up for an appointment.    Thank you  Suzanne    ----- Message -----     From: Reta Lam     Sent: 10/21/2018  9:48 PM PDT       To: Millicent Mcclain P.A.-C.  Subject: Prescription Question    Aniket Ricks!  Hope you are well! I increased my Pamelor to 75 mg, per our last email exchanges at the end of July. Unfortunately, my migraines have been getting much worse. I have been taking way too much Advil and excesderin and have had to take a few Imitrex, which I know isn’t good. And unfortunately it doesn’t really help.     So, I’m wondering if I need to switch medications?  Thanks for any help you can offer. I would be happy to come in too, if you think I need to!  Thanks so much!!!  Reta

## 2018-10-29 ENCOUNTER — HOSPITAL ENCOUNTER (OUTPATIENT)
Dept: LAB | Facility: MEDICAL CENTER | Age: 43
End: 2018-10-29
Attending: OTOLARYNGOLOGY
Payer: COMMERCIAL

## 2018-10-29 LAB
AMBIGUOUS DTTM AMBI4: NORMAL
GRAM STN SPEC: NORMAL
SIGNIFICANT IND 70042: NORMAL
SIGNIFICANT IND 70042: NORMAL
SITE SITE: NORMAL
SITE SITE: NORMAL
SOURCE SOURCE: NORMAL
SOURCE SOURCE: NORMAL

## 2018-10-29 PROCEDURE — 87070 CULTURE OTHR SPECIMN AEROBIC: CPT

## 2018-10-29 PROCEDURE — 87075 CULTR BACTERIA EXCEPT BLOOD: CPT

## 2018-10-29 PROCEDURE — 87205 SMEAR GRAM STAIN: CPT

## 2018-10-31 LAB
BACTERIA WND AEROBE CULT: NORMAL
GRAM STN SPEC: NORMAL
SIGNIFICANT IND 70042: NORMAL
SITE SITE: NORMAL
SOURCE SOURCE: NORMAL

## 2018-11-01 LAB
BACTERIA SPEC ANAEROBE CULT: NORMAL
SIGNIFICANT IND 70042: NORMAL
SITE SITE: NORMAL
SOURCE SOURCE: NORMAL

## 2018-11-07 ENCOUNTER — OFFICE VISIT (OUTPATIENT)
Dept: NEUROLOGY | Facility: MEDICAL CENTER | Age: 43
End: 2018-11-07
Payer: COMMERCIAL

## 2018-11-07 VITALS
TEMPERATURE: 97.3 F | BODY MASS INDEX: 38.42 KG/M2 | OXYGEN SATURATION: 96 % | DIASTOLIC BLOOD PRESSURE: 74 MMHG | WEIGHT: 253.5 LBS | SYSTOLIC BLOOD PRESSURE: 132 MMHG | HEART RATE: 112 BPM | HEIGHT: 68 IN

## 2018-11-07 PROCEDURE — 99214 OFFICE O/P EST MOD 30 MIN: CPT | Performed by: PHYSICIAN ASSISTANT

## 2018-11-07 RX ORDER — PROPRANOLOL HYDROCHLORIDE 20 MG/1
20 TABLET ORAL 2 TIMES DAILY
Qty: 60 TAB | Refills: 3 | Status: SHIPPED | OUTPATIENT
Start: 2018-11-07 | End: 2019-01-17

## 2018-11-07 RX ORDER — SUMATRIPTAN 20 MG/1
1 SPRAY NASAL PRN
Qty: 9 EACH | Refills: 10 | Status: SHIPPED | OUTPATIENT
Start: 2018-11-07 | End: 2019-01-23

## 2018-11-07 ASSESSMENT — PATIENT HEALTH QUESTIONNAIRE - PHQ9: CLINICAL INTERPRETATION OF PHQ2 SCORE: 0

## 2018-11-07 NOTE — PROGRESS NOTES
"Cc:  Migraine without aura/chronic migraine follow up     Pt last seen six months ago which was the initial appt.  At that time:    How many days do you keep ANY type of headache in any given month?  3 or fewer days______   Between 3 and 6 days______   Between 6 and 10 days_____  Between 11 and 14 days____  15 or more headache/days per month_X____  Has severe headaches __0__ days per month    PMH reviewed -   No asthma, no kidney stones, no mi, no stroke, no mood stabilizer    What are you taking right now for your headaches/#days per month of acute medication use:      Sumatriptan 100 mg           Prior acute treatments:  Medication/dose/timing/route/worked or side-effects?     none     What are you taking right now - daily - to prevent headaches?/dose     pamelor - much better with headaches now        Any prior prophylactic treatments:  Medications/dose/frequency/duration of treatment/worked or side effects?     topamax 25 mg daily - could not tolerate due to feeling too foggy    Plan at last visit:    Triptan - sumatriptan 100 mg prn     Daily Preventative Treatment:  Vitamins - handout provided - suggest she start one  Daily medicine - continue pamelor 20 mg daily      Interval History:    10/21 pt contacted me via [employer].  She increased her pamelor up to 75 mg and it doesn't seem to be working.  She wants to switch medication.  She was advised by Suzanne that she needed to make a follow up to discuss options.    Pt says she has now had a daily headache for months and is taking medication daily.  Reminded pt of medication overuse headache. Pt said a friend is now on CGRP med and she wants that because \"botox makes me nervous\" or asks to switch  to a cousin drug to pamelor.  We discussed risks associated with new drugs vs botox which has known side effects.  Also notified that in most cases insurance has same requirements to approve cgrp as botox and she has not yet taken a beta blocker.  Since her c/o decreased " effectiveness than it would likely not be of much help to switch from pamelor to elavil.    She has already tried and failed topamax.    BB -   We can try and add this on and see if she responds to this.  Pt also appears to need her rescue restarted as it is not listed on her current medications anymore.    Overusing medications that are OTC.  She is now back onto overusing daily medications otc.  Pamelor just doesn't seem to be working as well.  Also stopped taking any supplements    Plan:  Chronic migraine/medication overuse headache    Go back on a supplement - handout provided    Acute treatment:   Sumatriptan nasal spray will be sent to pharmacy.  Alternative if too expensive will be to try maxalt melt.    [facility] will get authorization and they will call you to schedule    Prevention:  Continue pamelor 75 mg at night - we will try propranol which she will take twice daily.  This is a Beta blocker.    Total time with this visit:  30   Minutes face-to-face with patient. More than 50% of this visit was spent educating patient on their illness and/or coordinating care, as detailed above

## 2018-11-07 NOTE — PATIENT INSTRUCTIONS
Go back on a supplement - handout provided    Acute treatment:   Sumatriptan nasal spray will be sent to pharmacy.  Alternative if too expensive will be to try maxalt melt.    [facility] will get authorization and they will call you to schedule    Prevention:  Continue pamelor 75 mg at night - we will try propranol which she will take twice daily.  This is a Beta blocker.

## 2018-11-14 ENCOUNTER — APPOINTMENT (RX ONLY)
Dept: URBAN - METROPOLITAN AREA CLINIC 4 | Facility: CLINIC | Age: 43
Setting detail: DERMATOLOGY
End: 2018-11-14

## 2018-11-14 DIAGNOSIS — L30.9 DERMATITIS, UNSPECIFIED: ICD-10-CM

## 2018-11-14 DIAGNOSIS — Z71.89 OTHER SPECIFIED COUNSELING: ICD-10-CM

## 2018-11-14 PROCEDURE — ? COUNSELING

## 2018-11-14 PROCEDURE — ? DIAGNOSIS COMMENT

## 2018-11-14 PROCEDURE — ? MEDICATION COUNSELING

## 2018-11-14 PROCEDURE — ? ADDITIONAL NOTES

## 2018-11-14 PROCEDURE — 99212 OFFICE O/P EST SF 10 MIN: CPT

## 2018-11-14 ASSESSMENT — LOCATION DETAILED DESCRIPTION DERM
LOCATION DETAILED: LEFT AXILLARY VAULT
LOCATION DETAILED: RIGHT AXILLARY VAULT
LOCATION DETAILED: STERNAL NOTCH

## 2018-11-14 ASSESSMENT — LOCATION ZONE DERM
LOCATION ZONE: AXILLAE
LOCATION ZONE: TRUNK

## 2018-11-14 ASSESSMENT — LOCATION SIMPLE DESCRIPTION DERM
LOCATION SIMPLE: CHEST
LOCATION SIMPLE: LEFT AXILLARY VAULT
LOCATION SIMPLE: RIGHT AXILLARY VAULT

## 2018-11-14 NOTE — PROCEDURE: MEDICATION COUNSELING
Terbinafine Pregnancy And Lactation Text: This medication is Pregnancy Category B and is considered safe during pregnancy. It is also excreted in breast milk and breast feeding isn't recommended.
Topical Sulfur Applications Pregnancy And Lactation Text: This medication is Pregnancy Category C and has an unknown safety profile during pregnancy. It is unknown if this topical medication is excreted in breast milk.
Elidel Counseling: Patient may experience a mild burning sensation during topical application. Elidel is not approved in children less than 2 years of age. There have been case reports of hematologic and skin malignancies in patients using topical calcineurin inhibitors although causality is questionable.
Metronidazole Pregnancy And Lactation Text: This medication is Pregnancy Category B and considered safe during pregnancy.  It is also excreted in breast milk.
Cosentyx Pregnancy And Lactation Text: This medication is Pregnancy Category B and is considered safe during pregnancy. It is unknown if this medication is excreted in breast milk.
Solaraze Counseling:  I discussed with the patient the risks of Solaraze including but not limited to erythema, scaling, itching, weeping, crusting, and pain.
Detail Level: Zone
Glycopyrrolate Pregnancy And Lactation Text: This medication is Pregnancy Category B and is considered safe during pregnancy. It is unknown if it is excreted breast milk.
Albendazole Counseling:  I discussed with the patient the risks of albendazole including but not limited to cytopenia, kidney damage, nausea/vomiting and severe allergy.  The patient understands that this medication is being used in an off-label manner.
Valtrex Counseling: I discussed with the patient the risks of valacyclovir including but not limited to kidney damage, nausea, vomiting and severe allergy.  The patient understands that if the infection seems to be worsening or is not improving, they are to call.
Rituxan Counseling:  I discussed with the patient the risks of Rituxan infusions. Side effects can include infusion reactions, severe drug rashes including mucocutaneous reactions, reactivation of latent hepatitis and other infections and rarely progressive multifocal leukoencephalopathy.  All of the patient's questions and concerns were addressed.
Prednisone Pregnancy And Lactation Text: This medication is Pregnancy Category C and it isn't know if it is safe during pregnancy. This medication is excreted in breast milk.
Clofazimine Pregnancy And Lactation Text: This medication is Pregnancy Category C and isn't considered safe during pregnancy. It is excreted in breast milk.
Oxybutynin Counseling:  I discussed with the patient the risks of oxybutynin including but not limited to skin rash, drowsiness, dry mouth, difficulty urinating, and blurred vision.
Cellcept Counseling:  I discussed with the patient the risks of mycophenolate mofetil including but not limited to infection/immunosuppression, GI upset, hypokalemia, hypercholesterolemia, bone marrow suppression, lymphoproliferative disorders, malignancy, GI ulceration/bleed/perforation, colitis, interstitial lung disease, kidney failure, progressive multifocal leukoencephalopathy, and birth defects.  The patient understands that monitoring is required including a baseline creatinine and regular CBC testing. In addition, patient must alert us immediately if symptoms of infection or other concerning signs are noted.
Drysol Pregnancy And Lactation Text: This medication is considered safe during pregnancy and breast feeding.
Cosentyx Counseling:  I discussed with the patient the risks of Cosentyx including but not limited to worsening of Crohn's disease, immunosuppression, allergic reactions and infections.  The patient understands that monitoring is required including a PPD at baseline and must alert us or the primary physician if symptoms of infection or other concerning signs are noted.
Topical Sulfur Applications Counseling: Topical Sulfur Counseling: Patient counseled that this medication may cause skin irritation or allergic reactions.  In the event of skin irritation, the patient was advised to reduce the amount of the drug applied or use it less frequently.   The patient verbalized understanding of the proper use and possible adverse effects of topical sulfur application.  All of the patient's questions and concerns were addressed.
Bactrim Pregnancy And Lactation Text: This medication is Pregnancy Category D and is known to cause fetal risk.  It is also excreted in breast milk.
Taltz Pregnancy And Lactation Text: The risk during pregnancy and breastfeeding is uncertain with this medication.
Protopic Pregnancy And Lactation Text: This medication is Pregnancy Category C. It is unknown if this medication is excreted in breast milk when applied topically.
Solaraze Pregnancy And Lactation Text: This medication is Pregnancy Category B and is considered safe. There is some data to suggest avoiding during the third trimester. It is unknown if this medication is excreted in breast milk.
Acitretin Counseling:  I discussed with the patient the risks of acitretin including but not limited to hair loss, dry lips/skin/eyes, liver damage, hyperlipidemia, depression/suicidal ideation, photosensitivity.  Serious rare side effects can include but are not limited to pancreatitis, pseudotumor cerebri, bony changes, clot formation/stroke/heart attack.  Patient understands that alcohol is contraindicated since it can result in liver toxicity and significantly prolong the elimination of the drug by many years.
Rituxan Pregnancy And Lactation Text: This medication is Pregnancy Category C and it isn't know if it is safe during pregnancy. It is unknown if this medication is excreted in breast milk but similar antibodies are known to be excreted.
Valtrex Pregnancy And Lactation Text: this medication is Pregnancy Category B and is considered safe during pregnancy. This medication is not directly found in breast milk but it's metabolite acyclovir is present.
Minocycline Counseling: Patient advised regarding possible photosensitivity and discoloration of the teeth, skin, lips, tongue and gums.  Patient instructed to avoid sunlight, if possible.  When exposed to sunlight, patients should wear protective clothing, sunglasses, and sunscreen.  The patient was instructed to call the office immediately if the following severe adverse effects occur:  hearing changes, easy bruising/bleeding, severe headache, or vision changes.  The patient verbalized understanding of the proper use and possible adverse effects of minocycline.  All of the patient's questions and concerns were addressed.
Dupixent Counseling: I discussed with the patient the risks of dupilumab including but not limited to eye infection and irritation, cold sores, injection site reactions, worsening of asthma, allergic reactions and increased risk of parasitic infection.  Live vaccines should be avoided while taking dupilumab. Dupilumab will also interact with certain medications such as warfarin and cyclosporine. The patient understands that monitoring is required and they must alert us or the primary physician if symptoms of infection or other concerning signs are noted.
Elidel Pregnancy And Lactation Text: This medication is Pregnancy Category C. It is unknown if this medication is excreted in breast milk.
Wartpeel Counseling:  I discussed with the patient the risks of Wartpeel including but not limited to erythema, scaling, itching, weeping, crusting, and pain.
Cellcept Pregnancy And Lactation Text: This medication is Pregnancy Category D and isn't considered safe during pregnancy. It is unknown if this medication is excreted in breast milk.
Cephalexin Counseling: I counseled the patient regarding use of cephalexin as an antibiotic for prophylactic and/or therapeutic purposes. Cephalexin (commonly prescribed under brand name Keflex) is a cephalosporin antibiotic which is active against numerous classes of bacteria, including most skin bacteria. Side effects may include nausea, diarrhea, gastrointestinal upset, rash, hives, yeast infections, and in rare cases, hepatitis, kidney disease, seizures, fever, confusion, neurologic symptoms, and others. Patients with severe allergies to penicillin medications are cautioned that there is about a 10% incidence of cross-reactivity with cephalosporins. When possible, patients with penicillin allergies should use alternatives to cephalosporins for antibiotic therapy.
Albendazole Pregnancy And Lactation Text: This medication is Pregnancy Category C and it isn't known if it is safe during pregnancy. It is also excreted in breast milk.
Hydroxychloroquine Counseling:  I discussed with the patient that a baseline ophthalmologic exam is needed at the start of therapy and every year thereafter while on therapy. A CBC may also be warranted for monitoring.  The side effects of this medication were discussed with the patient, including but not limited to agranulocytosis, aplastic anemia, seizures, rashes, retinopathy, and liver toxicity. Patient instructed to call the office should any adverse effect occur.  The patient verbalized understanding of the proper use and possible adverse effects of Plaquenil.  All the patient's questions and concerns were addressed.
Tremfya Counseling: I discussed with the patient the risks of guselkumab including but not limited to immunosuppression, serious infections, worsening of inflammatory bowel disease and drug reactions.  The patient understands that monitoring is required including a PPD at baseline and must alert us or the primary physician if symptoms of infection or other concerning signs are noted.
Colchicine Counseling:  Patient counseled regarding adverse effects including but not limited to stomach upset (nausea, vomiting, stomach pain, or diarrhea).  Patient instructed to limit alcohol consumption while taking this medication.  Colchicine may reduce blood counts especially with prolonged use.  The patient understands that monitoring of kidney function and blood counts may be required, especially at baseline. The patient verbalized understanding of the proper use and possible adverse effects of colchicine.  All of the patient's questions and concerns were addressed.
Oxybutynin Pregnancy And Lactation Text: This medication is Pregnancy Category B and is considered safe during pregnancy. It is unknown if it is excreted in breast milk.
Fluconazole Counseling:  Patient counseled regarding adverse effects of fluconazole including but not limited to headache, diarrhea, nausea, upset stomach, liver function test abnormalities, taste disturbance, and stomach pain.  There is a rare possibility of liver failure that can occur when taking fluconazole.  The patient understands that monitoring of LFTs and kidney function test may be required, especially at baseline. The patient verbalized understanding of the proper use and possible adverse effects of fluconazole.  All of the patient's questions and concerns were addressed.
Acitretin Pregnancy And Lactation Text: This medication is Pregnancy Category X and should not be given to women who are pregnant or may become pregnant in the future. This medication is excreted in breast milk.
Minocycline Pregnancy And Lactation Text: This medication is Pregnancy Category D and not consider safe during pregnancy. It is also excreted in breast milk.
Dapsone Counseling: I discussed with the patient the risks of dapsone including but not limited to hemolytic anemia, agranulocytosis, rashes, methemoglobinemia, kidney failure, peripheral neuropathy, headaches, GI upset, and liver toxicity.  Patients who start dapsone require monitoring including baseline LFTs and weekly CBCs for the first month, then every month thereafter.  The patient verbalized understanding of the proper use and possible adverse effects of dapsone.  All of the patient's questions and concerns were addressed.
Cyclophosphamide Pregnancy And Lactation Text: This medication is Pregnancy Category D and it isn't considered safe during pregnancy. This medication is excreted in breast milk.
Eucrisa Counseling: Patient may experience a mild burning sensation during topical application. Eucrisa is not approved in children less than 2 years of age.
Birth Control Pills Pregnancy And Lactation Text: This medication should be avoided if pregnant and for the first 30 days post-partum.
Hydroxychloroquine Pregnancy And Lactation Text: This medication has been shown to cause fetal harm but it isn't assigned a Pregnancy Risk Category. There are small amounts excreted in breast milk.
Griseofulvin Counseling:  I discussed with the patient the risks of griseofulvin including but not limited to photosensitivity, cytopenia, liver damage, nausea/vomiting and severe allergy.  The patient understands that this medication is best absorbed when taken with a fatty meal (e.g., ice cream or french fries).
Dupixent Pregnancy And Lactation Text: This medication likely crosses the placenta but the risk for the fetus is uncertain. This medication is excreted in breast milk.
Xeljanz Counseling: I discussed with the patient the risks of Xeljanz therapy including increased risk of infection, liver issues, headache, diarrhea, or cold symptoms. Live vaccines should be avoided. They were instructed to call if they have any problems.
Clindamycin Counseling: I counseled the patient regarding use of clindamycin as an antibiotic for prophylactic and/or therapeutic purposes. Clindamycin is active against numerous classes of bacteria, including skin bacteria. Side effects may include nausea, diarrhea, gastrointestinal upset, rash, hives, yeast infections, and in rare cases, colitis.
Topical Retinoid counseling:  Patient advised to apply a pea-sized amount only at bedtime and wait 30 minutes after washing their face before applying.  If too drying, patient may add a non-comedogenic moisturizer. The patient verbalized understanding of the proper use and possible adverse effects of retinoids.  All of the patient's questions and concerns were addressed.
Ivermectin Counseling:  Patient instructed to take medication on an empty stomach with a full glass of water.  Patient informed of potential adverse effects including but not limited to nausea, diarrhea, dizziness, itching, and swelling of the extremities or lymph nodes.  The patient verbalized understanding of the proper use and possible adverse effects of ivermectin.  All of the patient's questions and concerns were addressed.
Use Enhanced Medication Counseling?: No
Fluconazole Pregnancy And Lactation Text: This medication is Pregnancy Category C and it isn't know if it is safe during pregnancy. It is also excreted in breast milk.
Siliq Counseling:  I discussed with the patient the risks of Siliq including but not limited to new or worsening depression, suicidal thoughts and behavior, immunosuppression, malignancy, posterior leukoencephalopathy syndrome, and serious infections.  The patient understands that monitoring is required including a PPD at baseline and must alert us or the primary physician if symptoms of infection or other concerning signs are noted. There is also a special program designed to monitor depression which is required with Siliq.
Cimetidine Counseling:  I discussed with the patient the risks of Cimetidine including but not limited to gynecomastia, headache, diarrhea, nausea, drowsiness, arrhythmias, pancreatitis, skin rashes, psychosis, bone marrow suppression and kidney toxicity.
Wartpeel Pregnancy And Lactation Text: This medication is Pregnancy Category X and contraindicated in pregnancy and in women who may become pregnant. It is unknown if this medication is excreted in breast milk.
Cephalexin Pregnancy And Lactation Text: This medication is Pregnancy Category B and considered safe during pregnancy.  It is also excreted in breast milk but can be used safely for shorter doses.
Birth Control Pills Counseling: Birth Control Pill Counseling: I discussed with the patient the potential side effects of OCPs including but not limited to increased risk of stroke, heart attack, thrombophlebitis, deep venous thrombosis, hepatic adenomas, breast changes, GI upset, headaches, and depression.  The patient verbalized understanding of the proper use and possible adverse effects of OCPs. All of the patient's questions and concerns were addressed.
Cyclophosphamide Counseling:  I discussed with the patient the risks of cyclophosphamide including but not limited to hair loss, hormonal abnormalities, decreased fertility, abdominal pain, diarrhea, nausea and vomiting, bone marrow suppression and infection. The patient understands that monitoring is required while taking this medication.
Benzoyl Peroxide Counseling: Patient counseled that medicine may cause skin irritation and bleach clothing.  In the event of skin irritation, the patient was advised to reduce the amount of the drug applied or use it less frequently.   The patient verbalized understanding of the proper use and possible adverse effects of benzoyl peroxide.  All of the patient's questions and concerns were addressed.
Cyclosporine Counseling:  I discussed with the patient the risks of cyclosporine including but not limited to hypertension, gingival hyperplasia,myelosuppression, immunosuppression, liver damage, kidney damage, neurotoxicity, lymphoma, and serious infections. The patient understands that monitoring is required including baseline blood pressure, CBC, CMP, lipid panel and uric acid, and then 1-2 times monthly CMP and blood pressure.
Clindamycin Pregnancy And Lactation Text: This medication can be used in pregnancy if certain situations. Clindamycin is also present in breast milk.
Carac Counseling:  I discussed with the patient the risks of Carac including but not limited to erythema, scaling, itching, weeping, crusting, and pain.
Quinolones Counseling:  I discussed with the patient the risks of fluoroquinolones including but not limited to GI upset, allergic reaction, drug rash, diarrhea, dizziness, photosensitivity, yeast infections, liver function test abnormalities, tendonitis/tendon rupture.
Dapsone Pregnancy And Lactation Text: This medication is Pregnancy Category C and is not considered safe during pregnancy or breast feeding.
Bexarotene Counseling:  I discussed with the patient the risks of bexarotene including but not limited to hair loss, dry lips/skin/eyes, liver abnormalities, hyperlipidemia, pancreatitis, depression/suicidal ideation, photosensitivity, drug rash/allergic reactions, hypothyroidism, anemia, leukopenia, infection, cataracts, and teratogenicity.  Patient understands that they will need regular blood tests to check lipid profile, liver function tests, white blood cell count, thyroid function tests and pregnancy test if applicable.
Spironolactone Counseling: Patient advised regarding risks of diarrhea, abdominal pain, hyperkalemia, birth defects (for female patients), liver toxicity and renal toxicity. The patient may need blood work to monitor liver and kidney function and potassium levels while on therapy. The patient verbalized understanding of the proper use and possible adverse effects of spironolactone.  All of the patient's questions and concerns were addressed.
Eucrisa Pregnancy And Lactation Text: This medication has not been assigned a Pregnancy Risk Category but animal studies failed to show danger with the topical medication. It is unknown if the medication is excreted in breast milk.
Griseofulvin Pregnancy And Lactation Text: This medication is Pregnancy Category X and is known to cause serious birth defects. It is unknown if this medication is excreted in breast milk but breast feeding should be avoided.
Zyclara Counseling:  I discussed with the patient the risks of imiquimod including but not limited to erythema, scaling, itching, weeping, crusting, and pain.  Patient understands that the inflammatory response to imiquimod is variable from person to person and was educated regarded proper titration schedule.  If flu-like symptoms develop, patient knows to discontinue the medication and contact us.
Enbrel Counseling:  I discussed with the patient the risks of etanercept including but not limited to myelosuppression, immunosuppression, autoimmune hepatitis, demyelinating diseases, lymphoma, and infections.  The patient understands that monitoring is required including a PPD at baseline and must alert us or the primary physician if symptoms of infection or other concerning signs are noted.
Nsaids Counseling: NSAID Counseling: I discussed with the patient that NSAIDs should be taken with food. Prolonged use of NSAIDs can result in the development of stomach ulcers.  Patient advised to stop taking NSAIDs if abdominal pain occurs.  The patient verbalized understanding of the proper use and possible adverse effects of NSAIDs.  All of the patient's questions and concerns were addressed.
Xelkenaz Pregnancy And Lactation Text: This medication is Pregnancy Category D and is not considered safe during pregnancy.  The risk during breast feeding is also uncertain.
Benzoyl Peroxide Pregnancy And Lactation Text: This medication is Pregnancy Category C. It is unknown if benzoyl peroxide is excreted in breast milk.
Hydroquinone Counseling:  Patient advised that medication may result in skin irritation, lightening (hypopigmentation), dryness, and burning.  In the event of skin irritation, the patient was advised to reduce the amount of the drug applied or use it less frequently.  Rarely, spots that are treated with hydroquinone can become darker (pseudoochronosis).  Should this occur, patient instructed to stop medication and call the office. The patient verbalized understanding of the proper use and possible adverse effects of hydroquinone.  All of the patient's questions and concerns were addressed.
Spironolactone Pregnancy And Lactation Text: This medication can cause feminization of the male fetus and should be avoided during pregnancy. The active metabolite is also found in breast milk.
Itraconazole Counseling:  I discussed with the patient the risks of itraconazole including but not limited to liver damage, nausea/vomiting, neuropathy, and severe allergy.  The patient understands that this medication is best absorbed when taken with acidic beverages such as non-diet cola or ginger ale.  The patient understands that monitoring is required including baseline LFTs and repeat LFTs at intervals.  The patient understands that they are to contact us or the primary physician if concerning signs are noted.
Xolair Counseling:  Patient informed of potential adverse effects including but not limited to fever, muscle aches, rash and allergic reactions.  The patient verbalized understanding of the proper use and possible adverse effects of Xolair.  All of the patient's questions and concerns were addressed.
Tazorac Counseling:  Patient advised that medication is irritating and drying.  Patient may need to apply sparingly and wash off after an hour before eventually leaving it on overnight.  The patient verbalized understanding of the proper use and possible adverse effects of tazorac.  All of the patient's questions and concerns were addressed.
Nsaids Pregnancy And Lactation Text: These medications are considered safe up to 30 weeks gestation. It is excreted in breast milk.
Doxepin Counseling:  Patient advised that the medication is sedating and not to drive a car after taking this medication. Patient informed of potential adverse effects including but not limited to dry mouth, urinary retention, and blurry vision.  The patient verbalized understanding of the proper use and possible adverse effects of doxepin.  All of the patient's questions and concerns were addressed.
Erivedge Counseling- I discussed with the patient the risks of Erivedge including but not limited to nausea, vomiting, diarrhea, constipation, weight loss, changes in the sense of taste, decreased appetite, muscle spasms, and hair loss.  The patient verbalized understanding of the proper use and possible adverse effects of Erivedge.  All of the patient's questions and concerns were addressed.
Bexarotene Pregnancy And Lactation Text: This medication is Pregnancy Category X and should not be given to women who are pregnant or may become pregnant. This medication should not be used if you are breast feeding.
Doxycycline Counseling:  Patient counseled regarding possible photosensitivity and increased risk for sunburn.  Patient instructed to avoid sunlight, if possible.  When exposed to sunlight, patients should wear protective clothing, sunglasses, and sunscreen.  The patient was instructed to call the office immediately if the following severe adverse effects occur:  hearing changes, easy bruising/bleeding, severe headache, or vision changes.  The patient verbalized understanding of the proper use and possible adverse effects of doxycycline.  All of the patient's questions and concerns were addressed.
Simponi Counseling:  I discussed with the patient the risks of golimumab including but not limited to myelosuppression, immunosuppression, autoimmune hepatitis, demyelinating diseases, lymphoma, and serious infections.  The patient understands that monitoring is required including a PPD at baseline and must alert us or the primary physician if symptoms of infection or other concerning signs are noted.
Arava Counseling:  Patient counseled regarding adverse effects of Arava including but not limited to nausea, vomiting, abnormalities in liver function tests. Patients may develop mouth sores, rash, diarrhea, and abnormalities in blood counts. The patient understands that monitoring is required including LFTs and blood counts.  There is a rare possibility of scarring of the liver and lung problems that can occur when taking methotrexate. Persistent nausea, loss of appetite, pale stools, dark urine, cough, and shortness of breath should be reported immediately. Patient advised to discontinue Arava treatment and consult with a physician prior to attempting conception. The patient will have to undergo a treatment to eliminate Arava from the body prior to conception.
Topical Clindamycin Counseling: Patient counseled that this medication may cause skin irritation or allergic reactions.  In the event of skin irritation, the patient was advised to reduce the amount of the drug applied or use it less frequently.   The patient verbalized understanding of the proper use and possible adverse effects of clindamycin.  All of the patient's questions and concerns were addressed.
Odomzo Pregnancy And Lactation Text: This medication is Pregnancy Category X and is absolutely contraindicated during pregnancy. It is unknown if it is excreted in breast milk.
Rifampin Pregnancy And Lactation Text: This medication is Pregnancy Category C and it isn't know if it is safe during pregnancy. It is also excreted in breast milk and should not be used if you are breast feeding.
5-Fu Counseling: 5-Fluorouracil Counseling:  I discussed with the patient the risks of 5-fluorouracil including but not limited to erythema, scaling, itching, weeping, crusting, and pain.
Azithromycin Counseling:  I discussed with the patient the risks of azithromycin including but not limited to GI upset, allergic reaction, drug rash, diarrhea, and yeast infections.
Stelara Counseling:  I discussed with the patient the risks of ustekinumab including but not limited to immunosuppression, malignancy, posterior leukoencephalopathy syndrome, and serious infections.  The patient understands that monitoring is required including a PPD at baseline and must alert us or the primary physician if symptoms of infection or other concerning signs are noted.
Picato Counseling:  I discussed with the patient the risks of Picato including but not limited to erythema, scaling, itching, weeping, crusting, and pain.
Doxepin Pregnancy And Lactation Text: This medication is Pregnancy Category C and it isn't known if it is safe during pregnancy. It is also excreted in breast milk and breast feeding isn't recommended.
Isotretinoin Counseling: Patient should get monthly blood tests, not donate blood, not drive at night if vision affected, not share medication, and not undergo elective surgery for 6 months after tx completed. Side effects reviewed, pt to contact office should one occur.
Rifampin Counseling: I discussed with the patient the risks of rifampin including but not limited to liver damage, kidney damage, red-orange body fluids, nausea/vomiting and severe allergy.
Methotrexate Counseling:  Patient counseled regarding adverse effects of methotrexate including but not limited to nausea, vomiting, abnormalities in liver function tests. Patients may develop mouth sores, rash, diarrhea, and abnormalities in blood counts. The patient understands that monitoring is required including LFT's and blood counts.  There is a rare possibility of scarring of the liver and lung problems that can occur when taking methotrexate. Persistent nausea, loss of appetite, pale stools, dark urine, cough, and shortness of breath should be reported immediately. Patient advised to discontinue methotrexate treatment at least three months before attempting to become pregnant.  I discussed the need for folate supplements while taking methotrexate.  These supplements can decrease side effects during methotrexate treatment. The patient verbalized understanding of the proper use and possible adverse effects of methotrexate.  All of the patient's questions and concerns were addressed.
SSKI Counseling:  I discussed with the patient the risks of SSKI including but not limited to thyroid abnormalities, metallic taste, GI upset, fever, headache, acne, arthralgias, paraesthesias, lymphadenopathy, easy bleeding, arrhythmias, and allergic reaction.
Humira Counseling:  I discussed with the patient the risks of adalimumab including but not limited to myelosuppression, immunosuppression, autoimmune hepatitis, demyelinating diseases, lymphoma, and serious infections.  The patient understands that monitoring is required including a PPD at baseline and must alert us or the primary physician if symptoms of infection or other concerning signs are noted.
Odomzo Counseling- I discussed with the patient the risks of Odomzo including but not limited to nausea, vomiting, diarrhea, constipation, weight loss, changes in the sense of taste, decreased appetite, muscle spasms, and hair loss.  The patient verbalized understanding of the proper use and possible adverse effects of Odomzo.  All of the patient's questions and concerns were addressed.
Doxycycline Pregnancy And Lactation Text: This medication is Pregnancy Category D and not consider safe during pregnancy. It is also excreted in breast milk but is considered safe for shorter treatment courses.
Xolair Pregnancy And Lactation Text: This medication is Pregnancy Category B and is considered safe during pregnancy. This medication is excreted in breast milk.
Tazorac Pregnancy And Lactation Text: This medication is not safe during pregnancy. It is unknown if this medication is excreted in breast milk.
Azathioprine Counseling:  I discussed with the patient the risks of azathioprine including but not limited to myelosuppression, immunosuppression, hepatotoxicity, lymphoma, and infections.  The patient understands that monitoring is required including baseline LFTs, Creatinine, possible TPMP genotyping and weekly CBCs for the first month and then every 2 weeks thereafter.  The patient verbalized understanding of the proper use and possible adverse effects of azathioprine.  All of the patient's questions and concerns were addressed.
Azithromycin Pregnancy And Lactation Text: This medication is considered safe during pregnancy and is also secreted in breast milk.
Otezla Counseling: The side effects of Otezla were discussed with the patient, including but not limited to worsening or new depression, weight loss, diarrhea, nausea, upper respiratory tract infection, and headache. Patient instructed to call the office should any adverse effect occur.  The patient verbalized understanding of the proper use and possible adverse effects of Otezla.  All the patient's questions and concerns were addressed.
High Dose Vitamin A Counseling: Side effects reviewed, pt to contact office should one occur.
Hydroxyzine Pregnancy And Lactation Text: This medication is not safe during pregnancy and should not be taken. It is also excreted in breast milk and breast feeding isn't recommended.
Erythromycin Pregnancy And Lactation Text: This medication is Pregnancy Category B and is considered safe during pregnancy. It is also excreted in breast milk.
Infliximab Counseling:  I discussed with the patient the risks of infliximab including but not limited to myelosuppression, immunosuppression, autoimmune hepatitis, demyelinating diseases, lymphoma, and serious infections.  The patient understands that monitoring is required including a PPD at baseline and must alert us or the primary physician if symptoms of infection or other concerning signs are noted.
Tetracycline Counseling: Patient counseled regarding possible photosensitivity and increased risk for sunburn.  Patient instructed to avoid sunlight, if possible.  When exposed to sunlight, patients should wear protective clothing, sunglasses, and sunscreen.  The patient was instructed to call the office immediately if the following severe adverse effects occur:  hearing changes, easy bruising/bleeding, severe headache, or vision changes.  The patient verbalized understanding of the proper use and possible adverse effects of tetracycline.  All of the patient's questions and concerns were addressed. Patient understands to avoid pregnancy while on therapy due to potential birth defects.
Ketoconazole Counseling:   Patient counseled regarding improving absorption with orange juice.  Adverse effects include but are not limited to breast enlargement, headache, diarrhea, nausea, upset stomach, liver function test abnormalities, taste disturbance, and stomach pain.  There is a rare possibility of liver failure that can occur when taking ketoconazole. The patient understands that monitoring of LFTs may be required, especially at baseline. The patient verbalized understanding of the proper use and possible adverse effects of ketoconazole.  All of the patient's questions and concerns were addressed.
Methotrexate Pregnancy And Lactation Text: This medication is Pregnancy Category X and is known to cause fetal harm. This medication is excreted in breast milk.
Erythromycin Counseling:  I discussed with the patient the risks of erythromycin including but not limited to GI upset, allergic reaction, drug rash, diarrhea, increase in liver enzymes, and yeast infections.
Cimzia Counseling:  I discussed with the patient the risks of Cimzia including but not limited to immunosuppression, allergic reactions and infections.  The patient understands that monitoring is required including a PPD at baseline and must alert us or the primary physician if symptoms of infection or other concerning signs are noted.
Gabapentin Counseling: I discussed with the patient the risks of gabapentin including but not limited to dizziness, somnolence, fatigue and ataxia.
Isotretinoin Pregnancy And Lactation Text: This medication is Pregnancy Category X and is considered extremely dangerous during pregnancy. It is unknown if it is excreted in breast milk.
Hydroxyzine Counseling: Patient advised that the medication is sedating and not to drive a car after taking this medication.  Patient informed of potential adverse effects including but not limited to dry mouth, urinary retention, and blurry vision.  The patient verbalized understanding of the proper use and possible adverse effects of hydroxyzine.  All of the patient's questions and concerns were addressed.
Imiquimod Counseling:  I discussed with the patient the risks of imiquimod including but not limited to erythema, scaling, itching, weeping, crusting, and pain.  Patient understands that the inflammatory response to imiquimod is variable from person to person and was educated regarded proper titration schedule.  If flu-like symptoms develop, patient knows to discontinue the medication and contact us.
Sski Pregnancy And Lactation Text: This medication is Pregnancy Category D and isn't considered safe during pregnancy. It is excreted in breast milk.
High Dose Vitamin A Pregnancy And Lactation Text: High dose vitamin A therapy is contraindicated during pregnancy and breast feeding.
Terbinafine Counseling: Patient counseling regarding adverse effects of terbinafine including but not limited to headache, diarrhea, rash, upset stomach, liver function test abnormalities, itching, taste/smell disturbance, nausea, abdominal pain, and flatulence.  There is a rare possibility of liver failure that can occur when taking terbinafine.  The patient understands that a baseline LFT and kidney function test may be required. The patient verbalized understanding of the proper use and possible adverse effects of terbinafine.  All of the patient's questions and concerns were addressed.
Minoxidil Counseling: Minoxidil is a topical medication which can increase blood flow where it is applied. It is uncertain how this medication increases hair growth. Side effects are uncommon and include stinging and allergic reactions.
Bactrim Counseling:  I discussed with the patient the risks of sulfa antibiotics including but not limited to GI upset, allergic reaction, drug rash, diarrhea, dizziness, photosensitivity, and yeast infections.  Rarely, more serious reactions can occur including but not limited to aplastic anemia, agranulocytosis, methemoglobinemia, blood dyscrasias, liver or kidney failure, lung infiltrates or desquamative/blistering drug rashes.
Otezla Pregnancy And Lactation Text: This medication is Pregnancy Category C and it isn't known if it is safe during pregnancy. It is unknown if it is excreted in breast milk.
Glycopyrrolate Counseling:  I discussed with the patient the risks of glycopyrrolate including but not limited to skin rash, drowsiness, dry mouth, difficulty urinating, and blurred vision.
Metronidazole Counseling:  I discussed with the patient the risks of metronidazole including but not limited to seizures, nausea/vomiting, a metallic taste in the mouth, nausea/vomiting and severe allergy.
Protopic Counseling: Patient may experience a mild burning sensation during topical application. Protopic is not approved in children less than 2 years of age. There have been case reports of hematologic and skin malignancies in patients using topical calcineurin inhibitors although causality is questionable.
Taltz Counseling: I discussed with the patient the risks of ixekizumab including but not limited to immunosuppression, serious infections, worsening of inflammatory bowel disease and drug reactions.  The patient understands that monitoring is required including a PPD at baseline and must alert us or the primary physician if symptoms of infection or other concerning signs are noted.
Clofazimine Counseling:  I discussed with the patient the risks of clofazimine including but not limited to skin and eye pigmentation, liver damage, nausea/vomiting, gastrointestinal bleeding and allergy.
Thalidomide Counseling: I discussed with the patient the risks of thalidomide including but not limited to birth defects, anxiety, weakness, chest pain, dizziness, cough and severe allergy.
Ketoconazole Pregnancy And Lactation Text: This medication is Pregnancy Category C and it isn't know if it is safe during pregnancy. It is also excreted in breast milk and breast feeding isn't recommended.
Prednisone Counseling:  I discussed with the patient the risks of prolonged use of prednisone including but not limited to weight gain, insomnia, osteoporosis, mood changes, diabetes, susceptibility to infection, glaucoma and high blood pressure.  In cases where prednisone use is prolonged, patients should be monitored with blood pressure checks, serum glucose levels and an eye exam.  Additionally, the patient may need to be placed on GI prophylaxis, PCP prophylaxis, and calcium and vitamin D supplementation and/or a bisphosphonate.  The patient verbalized understanding of the proper use and the possible adverse effects of prednisone.  All of the patient's questions and concerns were addressed.
Drysol Counseling:  I discussed with the patient the risks of drysol/aluminum chloride including but not limited to skin rash, itching, irritation, burning.
Cimzia Pregnancy And Lactation Text: This medication crosses the placenta but can be considered safe in certain situations. Cimzia may be excreted in breast milk.

## 2018-11-14 NOTE — PROCEDURE: ADDITIONAL NOTES
Additional Notes: Advised to continue use of TAC-Nystatin for 1 more week. \\nAdvised to use TAC-Nystatin  as directed if symptoms recur\\nPatient advised to use and keep fragrance free and dye free protocol. \\nPatient advised to contact our office if rash worsens despite any tx.
Detail Level: Zone

## 2018-11-16 ENCOUNTER — TELEPHONE (OUTPATIENT)
Dept: NEUROLOGY | Facility: MEDICAL CENTER | Age: 43
End: 2018-11-16

## 2018-11-16 NOTE — TELEPHONE ENCOUNTER
Pharmacy and Patient let me know that Propranolol HCL 20 mg is on back order and unavailable at this time and is requesting the 40 mg. I called it in    Propranolol HCL 40 mg  #30  Refills- 3  Sig- Take 1/2 qAM for the first week. After the first week take one tab a day  [id number] - [place], [place] - [address] [phone] (Phone)  [phone] (Fax)

## 2018-12-05 ENCOUNTER — APPOINTMENT (OUTPATIENT)
Dept: NEUROLOGY | Facility: MEDICAL CENTER | Age: 43
End: 2018-12-05
Payer: COMMERCIAL

## 2018-12-12 ENCOUNTER — OFFICE VISIT (OUTPATIENT)
Dept: NEUROLOGY | Facility: MEDICAL CENTER | Age: 43
End: 2018-12-12
Payer: COMMERCIAL

## 2018-12-12 VITALS
SYSTOLIC BLOOD PRESSURE: 128 MMHG | HEART RATE: 99 BPM | BODY MASS INDEX: 38.89 KG/M2 | TEMPERATURE: 99 F | WEIGHT: 256.6 LBS | OXYGEN SATURATION: 99 % | HEIGHT: 68 IN | DIASTOLIC BLOOD PRESSURE: 62 MMHG

## 2018-12-12 DIAGNOSIS — G44.86 CERVICOGENIC HEADACHE: ICD-10-CM

## 2018-12-12 PROCEDURE — 64405 NJX AA&/STRD GR OCPL NRV: CPT | Mod: 50 | Performed by: PHYSICIAN ASSISTANT

## 2018-12-12 PROCEDURE — 64450 NJX AA&/STRD OTHER PN/BRANCH: CPT | Mod: 50 | Performed by: PHYSICIAN ASSISTANT

## 2018-12-12 PROCEDURE — S0020 INJECTION, BUPIVICAINE HYDRO: HCPCS | Performed by: PHYSICIAN ASSISTANT

## 2018-12-12 PROCEDURE — 64400 NJX AA&/STRD TRIGEMINAL NRV: CPT | Mod: 50 | Performed by: PHYSICIAN ASSISTANT

## 2018-12-12 RX ORDER — BUPIVACAINE HYDROCHLORIDE 5 MG/ML
20 INJECTION, SOLUTION EPIDURAL; INTRACAUDAL ONCE
Status: COMPLETED | OUTPATIENT
Start: 2018-12-12 | End: 2018-12-12

## 2018-12-12 RX ADMIN — BUPIVACAINE HYDROCHLORIDE 20 ML: 5 INJECTION, SOLUTION EPIDURAL; INTRACAUDAL at 16:48

## 2018-12-13 NOTE — PROGRESS NOTES
Est patient of mine here today for onb and trigger injections for medication overuse headache/chronic migraine/cervicogenic headache    Pt has tenderness to palpation cervicogenic nerves.    BONB procedure:    I performed a bilateral occipital nerve block in clinic today, injecting bupivacaine [5] cc in both sides.      I also injected bilateral trigeminal nerves in clinic today, injecting bupivacaine 1.5 cc  and 3.5 cc bupivicaine in bilateral lesser occipital nerves.    Prior to performing the procedure, the risks and side-effects were discussed with patient including risk for infection, pain, loss of hair, worsening of symptoms.    The patient tolerated the procedure well; there were no complications.

## 2019-01-10 ENCOUNTER — HOSPITAL ENCOUNTER (OUTPATIENT)
Dept: LAB | Facility: MEDICAL CENTER | Age: 44
End: 2019-01-10
Payer: COMMERCIAL

## 2019-01-10 LAB
FSH SERPL-ACNC: 6.2 MIU/ML
LH SERPL-ACNC: 11 IU/L

## 2019-01-10 PROCEDURE — 36415 COLL VENOUS BLD VENIPUNCTURE: CPT

## 2019-01-10 PROCEDURE — 83002 ASSAY OF GONADOTROPIN (LH): CPT

## 2019-01-10 PROCEDURE — 83001 ASSAY OF GONADOTROPIN (FSH): CPT

## 2019-01-17 ENCOUNTER — OFFICE VISIT (OUTPATIENT)
Dept: NEUROLOGY | Facility: MEDICAL CENTER | Age: 44
End: 2019-01-17
Payer: COMMERCIAL

## 2019-01-17 VITALS
TEMPERATURE: 98.1 F | SYSTOLIC BLOOD PRESSURE: 126 MMHG | HEIGHT: 68 IN | BODY MASS INDEX: 38.34 KG/M2 | DIASTOLIC BLOOD PRESSURE: 78 MMHG | HEART RATE: 92 BPM | WEIGHT: 253 LBS | OXYGEN SATURATION: 99 %

## 2019-01-17 PROCEDURE — 99213 OFFICE O/P EST LOW 20 MIN: CPT | Performed by: PHYSICIAN ASSISTANT

## 2019-01-17 RX ORDER — PROPRANOLOL HCL 60 MG
60 CAPSULE, EXTENDED RELEASE 24HR ORAL DAILY
Qty: 30 CAP | Refills: 11 | Status: SHIPPED | OUTPATIENT
Start: 2019-01-17 | End: 2019-03-07

## 2019-01-17 RX ORDER — LOSARTAN POTASSIUM 50 MG/1
50 TABLET ORAL
COMMUNITY
End: 2020-03-09 | Stop reason: SDUPTHER

## 2019-01-17 ASSESSMENT — PATIENT HEALTH QUESTIONNAIRE - PHQ9: CLINICAL INTERPRETATION OF PHQ2 SCORE: 0

## 2019-01-17 NOTE — PROGRESS NOTES
Cc:  Migraine with aura/chronic migraine    Last visit:      Pt had increased her pamilor to 75 mg but didn't feel it was working.  We tried propranolol and she was to take it twice daily.  Also sent an rx for sumatriptan nasal spray      Interval History:    Had some ONBs - she didn't feel it helped    She was off work a while this month and definitely noted a reduction in her headache frequency.  She has a stressful job which she attributes to triggering migraines    Current HA/migraine frequency - dec migraines/headaches 27 days 3-9 intensity  January 4 days so far with 5-8 range of intensity    Current med dosage:  Propranolol 40 mg in AM  pamelor 75 mg at night  Switched from lisinopril to losartan due to cough    Abortive: hasn't used abortive sumatriptan ns.  Counseled to try it.    A/p:  Chronic migraine.  We will increase propranolol to 60 mg ER and she will discontinue the 40 mg AM dosing.  Asked her to use the abortive and monitor results.  IN future if headaches are better controlled t/c titration down on pamelor, although pt likes it because it helps her with sleep.    Total time with this visit:  15   Minutes face-to-face with patient. More than 50% of this visit was spent educating patient on their illness and/or coordinating care, as detailed above

## 2019-01-23 ENCOUNTER — TELEMEDICINE2 (OUTPATIENT)
Dept: URGENT CARE | Facility: CLINIC | Age: 44
End: 2019-01-23
Payer: COMMERCIAL

## 2019-01-23 DIAGNOSIS — H66.002 ACUTE SUPPURATIVE OTITIS MEDIA OF LEFT EAR WITHOUT SPONTANEOUS RUPTURE OF TYMPANIC MEMBRANE, RECURRENCE NOT SPECIFIED: ICD-10-CM

## 2019-01-23 DIAGNOSIS — R05.9 COUGH: ICD-10-CM

## 2019-01-23 PROCEDURE — 99214 OFFICE O/P EST MOD 30 MIN: CPT | Performed by: NURSE PRACTITIONER

## 2019-01-23 RX ORDER — AMOXICILLIN AND CLAVULANATE POTASSIUM 875; 125 MG/1; MG/1
1 TABLET, FILM COATED ORAL 2 TIMES DAILY
Qty: 14 TAB | Refills: 0 | Status: SHIPPED | OUTPATIENT
Start: 2019-01-23 | End: 2019-01-30

## 2019-01-23 ASSESSMENT — ENCOUNTER SYMPTOMS
EYE PAIN: 0
DIARRHEA: 0
VOMITING: 0
DIZZINESS: 0
RHINORRHEA: 1
MYALGIAS: 1
CHILLS: 0
SORE THROAT: 1
NAUSEA: 0
FEVER: 0
HEADACHES: 0
SINUS PAIN: 0
COUGH: 1
SHORTNESS OF BREATH: 0
WHEEZING: 0

## 2019-01-23 NOTE — PROGRESS NOTES
Subjective:   Reta Lam is a 43 y.o. female who presents for Otalgia (xsaturday, sinus congestion, left ear pain, pain down side of jaw, sore throat, colored mucus)  Patient was presented for a telehealth consultation via secure and encrypted videoconferencing technology.   Patient is a 43-year-old female who complains of a cough, left ear pain, sinus congestion that has progressively worsened in the past 4-5 days.  Patient having radiating pain down the left side of her jaw.  She denies any fevers however does have chills and body aches.  She is taking over-the-counter Sudafed, cough medication with no relief in symptoms.  She denies any drainage of the left ear.      Otalgia    There is pain in the left ear. This is a new problem. Episode onset: 5 days. The problem occurs constantly. The problem has been gradually worsening. There has been no fever. The pain is at a severity of 10/10. The pain is severe. Associated symptoms include coughing, rhinorrhea and a sore throat. Pertinent negatives include no diarrhea, ear discharge, headaches, rash or vomiting. She has tried acetaminophen for the symptoms. The treatment provided no relief. There is no history of a chronic ear infection.   Cough   This is a new problem. The current episode started in the past 7 days. The problem has been unchanged. The problem occurs constantly. The cough is non-productive. Associated symptoms include ear pain, myalgias, nasal congestion, rhinorrhea and a sore throat. Pertinent negatives include no chest pain, chills, fever, headaches, rash, shortness of breath or wheezing. Nothing aggravates the symptoms. She has tried OTC cough suppressant for the symptoms. The treatment provided no relief. There is no history of bronchitis.     Review of Systems   Constitutional: Positive for malaise/fatigue. Negative for chills and fever.   HENT: Positive for congestion, ear pain, rhinorrhea and sore throat. Negative for ear discharge and  sinus pain.    Eyes: Negative for pain.   Respiratory: Positive for cough. Negative for shortness of breath and wheezing.    Cardiovascular: Negative for chest pain.   Gastrointestinal: Negative for diarrhea, nausea and vomiting.   Genitourinary: Negative for hematuria.   Musculoskeletal: Positive for myalgias.   Skin: Negative for rash.   Neurological: Negative for dizziness and headaches.     Allergies   Allergen Reactions   • Codeine Vomiting and Nausea      Objective:   There were no vitals taken for this visit.  Physical Exam   Constitutional: She appears well-developed.   HENT:   Head: Normocephalic.   Left Ear: No tenderness.   Patient visually pulling on left ear with no pain elicited   Pulmonary/Chest:   Coughing throughout tele-visit         Assessment/Plan:     1. Acute suppurative otitis media of left ear without spontaneous rupture of tympanic membrane, recurrence not specified  amoxicillin-clavulanate (AUGMENTIN) 875-125 MG Tab   2. Cough         Patient was presented for a telehealth consultation via secure and encrypted videoconferencing technology.   Physical exam limited as patient presented via tele-video.  No vital signs obtained for this visit.  Patient complaining of worsening left-sided ear pain.  Will treat on suspicion of bacterial infection of the left ear as patient does have radiating pain down the left jaw.  Will start patient on Augmentin.  Did reiterate to patient if symptoms not improving and worsening she will need to come into clinic for a full evaluation.  Patient verbalizing understanding.  Advised to continue supportive care with Tylenol and/or ibuprofen for fevers and discomfort. Increased fluids and electrolytes.  Patient given precautionary s/sx that mandate immediate follow up and evaluation in the ED. Advised of risks of not doing so.    DDX, Supportive care, and indications for immediate follow-up discussed with patient.    Instructed to return to clinic or nearest  emergency department if we are not available for any change in condition, further concerns, or worsening of symptoms.    The patient demonstrated a good understanding and agreed with the treatment plan.

## 2019-01-31 ENCOUNTER — OFFICE VISIT (OUTPATIENT)
Dept: URGENT CARE | Facility: CLINIC | Age: 44
End: 2019-01-31
Payer: COMMERCIAL

## 2019-01-31 VITALS
HEIGHT: 68 IN | RESPIRATION RATE: 16 BRPM | WEIGHT: 261 LBS | HEART RATE: 74 BPM | TEMPERATURE: 98.3 F | BODY MASS INDEX: 39.56 KG/M2 | DIASTOLIC BLOOD PRESSURE: 74 MMHG | OXYGEN SATURATION: 96 % | SYSTOLIC BLOOD PRESSURE: 120 MMHG

## 2019-01-31 DIAGNOSIS — H10.31 ACUTE BACTERIAL CONJUNCTIVITIS OF RIGHT EYE: ICD-10-CM

## 2019-01-31 PROCEDURE — 99214 OFFICE O/P EST MOD 30 MIN: CPT | Performed by: PHYSICIAN ASSISTANT

## 2019-01-31 RX ORDER — POLYMYXIN B SULFATE AND TRIMETHOPRIM 1; 10000 MG/ML; [USP'U]/ML
1 SOLUTION OPHTHALMIC EVERY 4 HOURS
Qty: 10 ML | Refills: 0 | Status: ON HOLD | OUTPATIENT
Start: 2019-01-31 | End: 2019-09-08

## 2019-01-31 ASSESSMENT — ENCOUNTER SYMPTOMS
DOUBLE VISION: 0
EYE ITCHING: 1
EYE DISCHARGE: 0
NAUSEA: 0
FOREIGN BODY SENSATION: 0
BLURRED VISION: 0
VOMITING: 0
FEVER: 0
EYE REDNESS: 1
PHOTOPHOBIA: 0

## 2019-01-31 NOTE — PROGRESS NOTES
Subjective:   Reta Lam is a 43 y.o. female who presents for Eye Problem ((R) eye watering, white discharge, little itchy x 1 day)        Eye Problem    The right eye is affected. This is a new problem. The current episode started yesterday. The problem occurs constantly. The problem has been unchanged. There was no injury mechanism. The patient is experiencing no pain. There is no known exposure to pink eye. She does not wear contacts. Associated symptoms include eye redness, itching and a recent URI. Pertinent negatives include no blurred vision, eye discharge, double vision, fever, foreign body sensation, nausea, photophobia or vomiting. She has tried nothing (antihistamine drops) for the symptoms. The treatment provided no relief.     Review of Systems   Constitutional: Negative for fever.   Eyes: Positive for redness and itching. Negative for blurred vision, double vision, photophobia and discharge.   Gastrointestinal: Negative for nausea and vomiting.       PMH:  has a past medical history of Bowel habit changes; Heart burn; High cholesterol; and Hypertension. She also has no past medical history of Allergy.  MEDS:   Current Outpatient Prescriptions:   •  polymixin-trimethoprim (POLYTRIM) 57156-6.1 UNIT/ML-% Solution, Place 1 Drop in both eyes every 4 hours., Disp: 10 mL, Rfl: 0  •  losartan (COZAAR) 50 MG Tab, Take 50 mg by mouth every day., Disp: , Rfl:   •  propranolol LA (INDERAL LA) 60 MG CAPSULE SR 24 HR, Take 1 Cap by mouth every day., Disp: 30 Cap, Rfl: 11  •  Multiple Vitamin (MULTI-VITAMIN PO), Take  by mouth., Disp: , Rfl:   •  Biotin 5000 MCG Cap, Take  by mouth every day., Disp: , Rfl:   •  Cholecalciferol (VITAMIN D3) 5000 UNITS Cap, Take 1 Cap by mouth every day., Disp: , Rfl:   •  omeprazole (PRILOSEC) 20 MG delayed-release capsule, Take 20 mg by mouth every evening., Disp: , Rfl:   •  Cetirizine HCl (ZYRTEC PO), Take  by mouth every day., Disp: , Rfl:   •  FEVERFEW PO, Take  by  "mouth., Disp: , Rfl:   •  ibuprofen (MOTRIN) 200 MG Tab, Take 200 mg by mouth every 6 hours as needed., Disp: , Rfl:   •  Levonorgestrel (MIRENA, 52 MG, IU), by Intrauterine route., Disp: , Rfl:   ALLERGIES:   Allergies   Allergen Reactions   • Codeine Vomiting and Nausea     SURGHX:   Past Surgical History:   Procedure Laterality Date   • SEPTOPLASTY Bilateral 6/17/2016    Procedure: SEPTOPLASTY;  Surgeon: Argelia Cheng M.D.;  Location: SURGERY [facility];  Service:    • TURBINOPLASTY Bilateral 6/17/2016    Procedure: TURBINOPLASTY;  Surgeon: Argelia Cheng M.D.;  Location: SURGERY [facility];  Service:    • ANTROSTOMY Bilateral 6/17/2016    Procedure: ANTROSTOMY - ENDOSCOPIC MAXILLARY;  Surgeon: Argelia Cheng M.D.;  Location: SURGERY [facility];  Service:    • PRIMARY C SECTION  11/30/2011    Performed by DANIA PERSAUD at [facility] DEL[facility]ERY   • TONSILLECTOMY  1991     SOCHX:  reports that she has never smoked. She has never used smokeless tobacco. She reports that she drinks alcohol. She reports that she does not use drugs.  FH: Family history was reviewed, no pertinent findings to report   Objective:   /74 (BP Location: Left arm, Patient Position: Sitting, BP Cuff Size: Large adult)   Pulse 74   Temp 36.8 °C (98.3 °F) (Temporal)   Resp 16   Ht 1.727 m (5' 7.99\")   Wt 118.4 kg (261 lb)   SpO2 96%   BMI 39.69 kg/m²   Physical Exam   Constitutional: She appears well-developed and well-nourished.   HENT:   Head: Normocephalic and atraumatic.   Eyes:   Mild right-sided conjunctival injection with scant white discharge at medial canthus.  Mild edema of right upper lid.  Dried, crusted discharge on lower lid.  Extraocular movements intact bilaterally.  Pupils equal and round and reactive to light with normal accommodation, bilaterally.   Neck: Neck supple.   Pulmonary/Chest: Effort normal. No respiratory distress.   Neurological: She is alert.   Skin: Skin is warm and " dry.   Psychiatric: She has a normal mood and affect. Her behavior is normal. Thought content normal.   Vitals reviewed.        Assessment/Plan:   1. Acute bacterial conjunctivitis of right eye  - polymixin-trimethoprim (POLYTRIM) 29963-9.1 UNIT/ML-% Solution; Place 1 Drop in both eyes every 4 hours.  Dispense: 10 mL; Refill: 0    Physical exam consistent with conjunctivitis.  I will treat with topical antibiotics.  Patient should use this medication until symptoms completely resolved.  If patient noticed is that left eye is developing similar symptoms she should also apply drops to her left eye.  If symptoms worsen or fail to completely resolve in the next 5 days-patient was instructed to return to clinic for reevaluation.    Differential diagnosis, natural history, supportive care, and indications for immediate follow-up discussed.

## 2019-03-07 ENCOUNTER — OFFICE VISIT (OUTPATIENT)
Dept: NEUROLOGY | Facility: MEDICAL CENTER | Age: 44
End: 2019-03-07
Payer: COMMERCIAL

## 2019-03-07 VITALS
DIASTOLIC BLOOD PRESSURE: 72 MMHG | SYSTOLIC BLOOD PRESSURE: 130 MMHG | OXYGEN SATURATION: 97 % | BODY MASS INDEX: 39.8 KG/M2 | HEIGHT: 68 IN | TEMPERATURE: 96.4 F | HEART RATE: 105 BPM | WEIGHT: 262.6 LBS

## 2019-03-07 DIAGNOSIS — G44.86 CERVICOGENIC HEADACHE: ICD-10-CM

## 2019-03-07 PROCEDURE — 99213 OFFICE O/P EST LOW 20 MIN: CPT | Performed by: PHYSICIAN ASSISTANT

## 2019-03-07 RX ORDER — PROPRANOLOL HYDROCHLORIDE 80 MG/1
80 CAPSULE, EXTENDED RELEASE ORAL DAILY
Qty: 30 CAP | Refills: 11 | Status: ON HOLD | OUTPATIENT
Start: 2019-03-07 | End: 2019-09-08

## 2019-03-07 RX ORDER — ZOLMITRIPTAN 5 MG/1
5 TABLET, FILM COATED ORAL PRN
Qty: 10 TAB | Refills: 11 | Status: ON HOLD | OUTPATIENT
Start: 2019-03-07 | End: 2019-09-08

## 2019-03-07 NOTE — PROGRESS NOTES
Est patient of mine here for migraine with aura/chronic migraine follow up    Last visit was 1/17/19. At that time:    Had some ONBs - she didn't feel it helped     She was off work a while this month and definitely noted a reduction in her headache frequency.  She has a stressful job which she attributes to triggering migraines     Current HA/migraine frequency - dec migraines/headaches 27 days 3-9 intensity  January 4 days so far with 5-8 range of intensity     Current med dosage:  Propranolol 40 mg in AM  pamelor 75 mg at night  Switched from lisinopril to losartan due to cough     Abortive: hasn't used abortive sumatriptan ns.  Counseled to try it.     A/p:  Chronic migraine.  We will increase propranolol to 60 mg ER and she will discontinue the 40 mg AM dosing.  Asked her to use the abortive and monitor results.  IN future if headaches are better controlled t/c titration down on pamelor, although pt likes it because it helps her with sleep.      Interval History:    Current ha/migraine frequency: daily headache with probably 3-6 of them being over a 6.      Abortive tried? Doesn't like or think the nasal spray worked    Propranol 60 mg ER formulation? Taking as prescribed.    Pt has already tried and failed topamax and antidepressant.  BB is now our last daily medication option before consideration of botox or new cgrp medications.      A/p:  Chronic migraine not responding to multiple daily medications tried.  I will increase propranolol further but doubt pt will be able to tolerate as she is already feeling fatigue and also doubt it will help as review of diary shows continued daily headache.  Pt does not like nasal spray or injectible medication - need to try another abortive pill and it was sent to pharmacy.      This patient has chronic daily migraines, defined as having 15 or more headaches days per month over a minimum of the last three months. Episodes last more than 4 hours (untreated).     This  patient does not have medication overuse headache.      Previous prophylactic treatments for at least three months have included:  topamax or depakote: topamax tried and failed  beta-blockers:tried and failed  Venlafaxine or a TCA: tried and failed     At this point the only option left would be to use BTX injections for chronic migraine.    Total time with this visit:  20   Minutes face-to-face with patient. More than 50% of this visit was spent educating patient on their illness and/or coordinating care, as detailed above

## 2019-04-17 ENCOUNTER — OFFICE VISIT (OUTPATIENT)
Dept: NEUROLOGY | Facility: MEDICAL CENTER | Age: 44
End: 2019-04-17
Payer: COMMERCIAL

## 2019-04-17 VITALS
BODY MASS INDEX: 39.71 KG/M2 | WEIGHT: 262 LBS | HEART RATE: 75 BPM | TEMPERATURE: 98.3 F | HEIGHT: 68 IN | DIASTOLIC BLOOD PRESSURE: 70 MMHG | OXYGEN SATURATION: 98 % | RESPIRATION RATE: 16 BRPM | SYSTOLIC BLOOD PRESSURE: 110 MMHG

## 2019-04-17 PROCEDURE — 64615 CHEMODENERV MUSC MIGRAINE: CPT | Performed by: PHYSICIAN ASSISTANT

## 2019-04-17 RX ORDER — NORTRIPTYLINE HYDROCHLORIDE 10 MG/1
50 CAPSULE ORAL NIGHTLY
Status: ON HOLD | COMMUNITY
End: 2019-09-08

## 2019-04-17 NOTE — PROGRESS NOTES
Est patient of mine here today for initial botox.    I  treated this patient in clinic today with BotoxA 155 units according to the dosing/injection paradigm currently mandated by the FDA for the management of chronic migraine. Specifically, I injected 5 units to the procerus, 5 units to the corrugators bilaterally, a total of 20 units to the frontalis musculature, 20 units to the temporalis bilaterally, 15 units to the occipitalis bilaterally, 10 units to the cervical paraspinals bilaterally and 15 units to the trapezius musculature bilaterally.    Pt was advised of side effects associated with medication.    The remainder of the Botox 200 units mixed but not administered was discarded as wastage per FDA guidelines.

## 2019-04-18 ENCOUNTER — APPOINTMENT (OUTPATIENT)
Dept: NEUROLOGY | Facility: MEDICAL CENTER | Age: 44
End: 2019-04-18
Payer: COMMERCIAL

## 2019-05-29 ENCOUNTER — APPOINTMENT (RX ONLY)
Dept: URBAN - METROPOLITAN AREA CLINIC 4 | Facility: CLINIC | Age: 44
Setting detail: DERMATOLOGY
End: 2019-05-29

## 2019-05-29 DIAGNOSIS — D18.0 HEMANGIOMA: ICD-10-CM

## 2019-05-29 DIAGNOSIS — L81.4 OTHER MELANIN HYPERPIGMENTATION: ICD-10-CM

## 2019-05-29 DIAGNOSIS — L57.8 OTHER SKIN CHANGES DUE TO CHRONIC EXPOSURE TO NONIONIZING RADIATION: ICD-10-CM

## 2019-05-29 DIAGNOSIS — L91.8 OTHER HYPERTROPHIC DISORDERS OF THE SKIN: ICD-10-CM

## 2019-05-29 DIAGNOSIS — Z71.89 OTHER SPECIFIED COUNSELING: ICD-10-CM

## 2019-05-29 DIAGNOSIS — L82.1 OTHER SEBORRHEIC KERATOSIS: ICD-10-CM

## 2019-05-29 DIAGNOSIS — D22 MELANOCYTIC NEVI: ICD-10-CM

## 2019-05-29 PROBLEM — D22.72 MELANOCYTIC NEVI OF LEFT LOWER LIMB, INCLUDING HIP: Status: ACTIVE | Noted: 2019-05-29

## 2019-05-29 PROBLEM — D22.61 MELANOCYTIC NEVI OF RIGHT UPPER LIMB, INCLUDING SHOULDER: Status: ACTIVE | Noted: 2019-05-29

## 2019-05-29 PROBLEM — D22.5 MELANOCYTIC NEVI OF TRUNK: Status: ACTIVE | Noted: 2019-05-29

## 2019-05-29 PROBLEM — D18.01 HEMANGIOMA OF SKIN AND SUBCUTANEOUS TISSUE: Status: ACTIVE | Noted: 2019-05-29

## 2019-05-29 PROBLEM — D22.62 MELANOCYTIC NEVI OF LEFT UPPER LIMB, INCLUDING SHOULDER: Status: ACTIVE | Noted: 2019-05-29

## 2019-05-29 PROBLEM — D22.39 MELANOCYTIC NEVI OF OTHER PARTS OF FACE: Status: ACTIVE | Noted: 2019-05-29

## 2019-05-29 PROBLEM — D22.71 MELANOCYTIC NEVI OF RIGHT LOWER LIMB, INCLUDING HIP: Status: ACTIVE | Noted: 2019-05-29

## 2019-05-29 PROCEDURE — ? COUNSELING

## 2019-05-29 PROCEDURE — 99214 OFFICE O/P EST MOD 30 MIN: CPT

## 2019-05-29 PROCEDURE — ? ADDITIONAL NOTES

## 2019-05-29 ASSESSMENT — LOCATION SIMPLE DESCRIPTION DERM
LOCATION SIMPLE: RIGHT AXILLARY VAULT
LOCATION SIMPLE: RIGHT ANTERIOR NECK
LOCATION SIMPLE: RIGHT UPPER ARM
LOCATION SIMPLE: LEFT UPPER ARM
LOCATION SIMPLE: CHEST
LOCATION SIMPLE: RIGHT THIGH
LOCATION SIMPLE: LEFT UPPER BACK
LOCATION SIMPLE: LEFT AXILLARY VAULT
LOCATION SIMPLE: SUPERIOR FOREHEAD
LOCATION SIMPLE: LEFT FOREHEAD
LOCATION SIMPLE: INFERIOR FOREHEAD
LOCATION SIMPLE: UPPER BACK
LOCATION SIMPLE: LEFT THIGH
LOCATION SIMPLE: ABDOMEN

## 2019-05-29 ASSESSMENT — LOCATION DETAILED DESCRIPTION DERM
LOCATION DETAILED: LEFT INFERIOR MEDIAL FOREHEAD
LOCATION DETAILED: SUPERIOR MID FOREHEAD
LOCATION DETAILED: LEFT ANTERIOR DISTAL THIGH
LOCATION DETAILED: LOWER STERNUM
LOCATION DETAILED: MIDDLE STERNUM
LOCATION DETAILED: INFERIOR THORACIC SPINE
LOCATION DETAILED: LEFT MEDIAL UPPER BACK
LOCATION DETAILED: RIGHT ANTERIOR DISTAL THIGH
LOCATION DETAILED: LEFT ANTERIOR PROXIMAL THIGH
LOCATION DETAILED: INFERIOR MID FOREHEAD
LOCATION DETAILED: LEFT MEDIAL FOREHEAD
LOCATION DETAILED: RIGHT ANTERIOR PROXIMAL THIGH
LOCATION DETAILED: LEFT AXILLARY VAULT
LOCATION DETAILED: LEFT ANTERIOR DISTAL UPPER ARM
LOCATION DETAILED: UPPER STERNUM
LOCATION DETAILED: RIGHT ANTECUBITAL SKIN
LOCATION DETAILED: RIGHT AXILLARY VAULT
LOCATION DETAILED: RIGHT ANTERIOR DISTAL UPPER ARM
LOCATION DETAILED: EPIGASTRIC SKIN
LOCATION DETAILED: LEFT SUPERIOR MEDIAL UPPER BACK
LOCATION DETAILED: SUPERIOR THORACIC SPINE
LOCATION DETAILED: RIGHT INFERIOR ANTERIOR NECK

## 2019-05-29 ASSESSMENT — LOCATION ZONE DERM
LOCATION ZONE: FACE
LOCATION ZONE: TRUNK
LOCATION ZONE: NECK
LOCATION ZONE: LEG
LOCATION ZONE: ARM
LOCATION ZONE: AXILLAE

## 2019-05-29 NOTE — HPI: EVALUATION OF SKIN LESION(S)
What Type Of Note Output Would You Prefer (Optional)?: Standard Output
How Severe Are Your Spot(S)?: mild
Have Your Spot(S) Been Treated In The Past?: has not been treated
Hpi Title: Evaluation of Skin Lesions
Additional History: Patient is in for a FBE.
Family Member: Maternal grandmother

## 2019-06-21 ENCOUNTER — OFFICE VISIT (OUTPATIENT)
Dept: URGENT CARE | Facility: CLINIC | Age: 44
End: 2019-06-21
Payer: COMMERCIAL

## 2019-06-21 VITALS
TEMPERATURE: 97.6 F | BODY MASS INDEX: 39.71 KG/M2 | HEART RATE: 88 BPM | RESPIRATION RATE: 18 BRPM | SYSTOLIC BLOOD PRESSURE: 122 MMHG | HEIGHT: 68 IN | OXYGEN SATURATION: 97 % | DIASTOLIC BLOOD PRESSURE: 86 MMHG | WEIGHT: 262 LBS

## 2019-06-21 DIAGNOSIS — H93.8X2 EAR FULLNESS, LEFT: ICD-10-CM

## 2019-06-21 DIAGNOSIS — H92.09 OTALGIA, UNSPECIFIED LATERALITY: ICD-10-CM

## 2019-06-21 PROCEDURE — 99214 OFFICE O/P EST MOD 30 MIN: CPT | Performed by: PHYSICIAN ASSISTANT

## 2019-06-21 RX ORDER — METHYLPREDNISOLONE 4 MG/1
TABLET ORAL
Qty: 1 KIT | Refills: 0 | Status: SHIPPED | OUTPATIENT
Start: 2019-06-21 | End: 2019-07-23

## 2019-06-21 ASSESSMENT — ENCOUNTER SYMPTOMS
SPUTUM PRODUCTION: 0
NAUSEA: 0
SINUS PAIN: 0
WHEEZING: 0
DIARRHEA: 0
CHILLS: 0
COUGH: 0
VOMITING: 0
FEVER: 0
SORE THROAT: 0
SHORTNESS OF BREATH: 0
ABDOMINAL PAIN: 0

## 2019-06-21 NOTE — PROGRESS NOTES
"Subjective:   Reta Lam is a 43 y.o. female who presents for Ear Pain (x1week, left ear pain, swollen gland, off balance)        Patient notes pain to left ear over the last 1 to 2 weeks.  She notes past medical history of severe seasonal allergies and was having issues with ear fullness and sinus congestion.  Just over week ago she traveled to [place] and had some discomfort in the left ear during the flight there that resolved while in [place] and then again on the flight home.  She notes over the few days since returning has had persistent fullness to left ear.  She denies discharge drainage.  She notes persistent sinus congestion.  Has been treating seasonal allergies with antihistamine.  Denies fevers chills cough or sore throat.  Denies nausea vomiting abdominal pain diarrhea or rash      Review of Systems   Constitutional: Negative for chills and fever.   HENT: Positive for congestion and ear pain. Negative for ear discharge, sinus pain and sore throat.    Respiratory: Negative for cough, sputum production, shortness of breath and wheezing.    Gastrointestinal: Negative for abdominal pain, diarrhea, nausea and vomiting.   Skin: Negative for rash.   Endo/Heme/Allergies: Positive for environmental allergies.     Allergies   Allergen Reactions   • Codeine Vomiting and Nausea      Objective:   /86 (BP Location: Left arm, Patient Position: Sitting, BP Cuff Size: Adult)   Pulse 88   Temp 36.4 °C (97.6 °F) (Temporal)   Resp 18   Ht 1.727 m (5' 8\")   Wt 118.8 kg (262 lb)   SpO2 97%   BMI 39.84 kg/m²   Physical Exam   Constitutional: She is oriented to person, place, and time. She appears well-developed and well-nourished. No distress.   HENT:   Head: Normocephalic and atraumatic.   Right Ear: Tympanic membrane, external ear and ear canal normal. Tympanic membrane is not erythematous and not bulging.   Left Ear: External ear and ear canal normal. Tympanic membrane is bulging. Tympanic membrane " is not erythematous.   Nose: Nose normal. Right sinus exhibits no maxillary sinus tenderness and no frontal sinus tenderness. Left sinus exhibits no maxillary sinus tenderness and no frontal sinus tenderness.   Mouth/Throat: Uvula is midline and mucous membranes are normal. Posterior oropharyngeal erythema ( mild PND) present. No oropharyngeal exudate, posterior oropharyngeal edema or tonsillar abscesses.   Eyes: Conjunctivae and lids are normal. Right eye exhibits no discharge. Left eye exhibits no discharge. No scleral icterus.   Neck: Neck supple.   Pulmonary/Chest: Effort normal. No respiratory distress. She has no decreased breath sounds. She has no wheezes. She has no rhonchi. She has no rales.   Musculoskeletal: Normal range of motion.   Lymphadenopathy:     She has cervical adenopathy ( mild bilat).   Neurological: She is alert and oriented to person, place, and time. She is not disoriented.   Skin: Skin is warm and dry. She is not diaphoretic. No erythema. No pallor.   Psychiatric: Her speech is normal and behavior is normal.   Nursing note and vitals reviewed.        Assessment/Plan:   1. Otalgia, unspecified laterality  - methylPREDNISolone (MEDROL DOSEPAK) 4 MG Tablet Therapy Pack; Take as directed on package.  Dispense one package.  Dispense: 1 Kit; Refill: 0    2. Ear fullness, left  - methylPREDNISolone (MEDROL DOSEPAK) 4 MG Tablet Therapy Pack; Take as directed on package.  Dispense one package.  Dispense: 1 Kit; Refill: 0  Supportive care is reviewed with patient/caregiver - recommend to push PO fluids and electrolytes, Nsaids/tylenol, netti pot/saline irrig, humidifier in home, flonase, ponaris, antihistamine, no sign of infection, no indication for antibiotics, will treat with steroid now , Cautioned regarding potential side effects of steroid, avoid nsaids while using  differential diagnosis, natural history, supportive care, and indications for immediate follow-up discussed.

## 2019-07-23 ENCOUNTER — OFFICE VISIT (OUTPATIENT)
Dept: NEUROLOGY | Facility: MEDICAL CENTER | Age: 44
End: 2019-07-23
Payer: COMMERCIAL

## 2019-07-23 VITALS
SYSTOLIC BLOOD PRESSURE: 124 MMHG | WEIGHT: 262 LBS | OXYGEN SATURATION: 98 % | TEMPERATURE: 98.4 F | RESPIRATION RATE: 16 BRPM | HEIGHT: 68 IN | BODY MASS INDEX: 39.71 KG/M2 | DIASTOLIC BLOOD PRESSURE: 84 MMHG | HEART RATE: 85 BPM

## 2019-07-23 PROCEDURE — 64615 CHEMODENERV MUSC MIGRAINE: CPT | Performed by: NURSE PRACTITIONER

## 2019-07-23 NOTE — PROGRESS NOTES
"Subjective:      Reta Lam is a 44 y.o. female who presents with Botox Injection (Chronic migraine)            HPI  Here for Botox today, 2nd set.  Previously screened and initial Botox set provided per Millicent Mcclain PA-C.    She reports a signficant improvement in her headache profile.    Current Outpatient Prescriptions   Medication Sig Dispense Refill   • nortriptyline (PAMELOR) 10 MG Cap Take 10 mg by mouth every evening.     • propranolol CR (INDERAL LA) 80 MG CAPSULE SR 24 HR Take 1 Cap by mouth every day. 30 Cap 11   • zolmitriptan (ZOMIG) 5 MG tablet Take 1 Tab by mouth as needed for Migraine. 10 Tab 11   • polymixin-trimethoprim (POLYTRIM) 36932-1.1 UNIT/ML-% Solution Place 1 Drop in both eyes every 4 hours. 10 mL 0   • losartan (COZAAR) 50 MG Tab Take 50 mg by mouth every day.     • FEVERFEW PO Take  by mouth.     • ibuprofen (MOTRIN) 200 MG Tab Take 200 mg by mouth every 6 hours as needed.     • Multiple Vitamin (MULTI-VITAMIN PO) Take  by mouth.     • Levonorgestrel (MIRENA, 52 MG, IU) by Intrauterine route.     • Biotin 5000 MCG Cap Take  by mouth every day.     • Cholecalciferol (VITAMIN D3) 5000 UNITS Cap Take 1 Cap by mouth every day.     • omeprazole (PRILOSEC) 20 MG delayed-release capsule Take 20 mg by mouth every evening.     • Cetirizine HCl (ZYRTEC PO) Take  by mouth every day.     • methylPREDNISolone (MEDROL DOSEPAK) 4 MG Tablet Therapy Pack Take as directed on package.  Dispense one package. 1 Kit 0     No current facility-administered medications for this visit.            ROS       Objective:     /84 (BP Location: Left arm, Patient Position: Sitting, BP Cuff Size: Adult)   Pulse 85   Temp 36.9 °C (98.4 °F) (Temporal)   Resp 16   Ht 1.727 m (5' 7.99\")   Wt 118.8 kg (262 lb)   SpO2 98%   BMI 39.85 kg/m²      Physical Exam            Assessment/Plan:       Chronic Migraine:  Botox therapy has reduced patient’s migraines by more than 7 days and/or 100 hours per month. "  Additionally pt has noted a reduction in the amount of medication they are taking to treat their migraines and/or they are having a reduction in intractable migraine/status migrainosis which can result in urgent care or ER visits.    Documentation of patient's symptoms with migraine are included in the initial note with this patient including the following:  Education/counseling/reduction in medications if pt has medication overuse headache;  Frequency of headaches is >15 days monthly with at least 8 migraines monthly;  Migraines include at least two of the following: worsened with activity or avoidance of activity with migraines (ie they go lie down), moderate to severe pain intensity, pulsing headache, unilateral headache AND they have either nausea or vomiting OR sensitivity to light and sound    Although this patient is responding to botox they are NOT migraine free, however, and it is my recommendation botox be continued at this time    I treated this patient in clinic today with BotoxA 155 units according to the dosing/injection paradigm currently mandated by the FDA for the management of chronic migraine. Specifically, I injected 5 units to the procerus, 5 units to the corrugators bilaterally, a total of 20 units to the frontalis musculature, 20 units to the temporalis bilaterally, 15 units to the occipitalis bilaterally, 10 units to the cervical paraspinals bilaterally and 15 units to the trapezius musculature bilaterally. The remainder of the Botox 200 units mixed but not administered was discarded as wastage per FDA guidelines.    Return for follow-up in 12 weeks for serial set of Botox.

## 2019-08-05 NOTE — TELEPHONE ENCOUNTER
Was the patient seen in the last year in this department? Yes Last seen for Botox 07/23/19    Does patient have an active prescription for medications requested? No     Received Request Via: Pharmacy    Next appt 10/15/19

## 2019-08-06 ENCOUNTER — OFFICE VISIT (OUTPATIENT)
Dept: URGENT CARE | Facility: CLINIC | Age: 44
End: 2019-08-06
Payer: COMMERCIAL

## 2019-08-06 VITALS
HEART RATE: 115 BPM | SYSTOLIC BLOOD PRESSURE: 126 MMHG | BODY MASS INDEX: 39.86 KG/M2 | TEMPERATURE: 98.1 F | DIASTOLIC BLOOD PRESSURE: 82 MMHG | WEIGHT: 263 LBS | RESPIRATION RATE: 20 BRPM | HEIGHT: 68 IN | OXYGEN SATURATION: 96 %

## 2019-08-06 DIAGNOSIS — J01.40 ACUTE PANSINUSITIS, RECURRENCE NOT SPECIFIED: ICD-10-CM

## 2019-08-06 DIAGNOSIS — R05.9 COUGH: ICD-10-CM

## 2019-08-06 DIAGNOSIS — J06.9 UPPER RESPIRATORY TRACT INFECTION, UNSPECIFIED TYPE: ICD-10-CM

## 2019-08-06 PROCEDURE — 99214 OFFICE O/P EST MOD 30 MIN: CPT | Performed by: NURSE PRACTITIONER

## 2019-08-06 RX ORDER — BENZONATATE 100 MG/1
100 CAPSULE ORAL 3 TIMES DAILY PRN
Qty: 30 CAP | Refills: 0 | Status: ON HOLD | OUTPATIENT
Start: 2019-08-06 | End: 2019-09-08

## 2019-08-06 RX ORDER — AMOXICILLIN AND CLAVULANATE POTASSIUM 875; 125 MG/1; MG/1
1 TABLET, FILM COATED ORAL 2 TIMES DAILY
Qty: 20 TAB | Refills: 0 | Status: SHIPPED | OUTPATIENT
Start: 2019-08-06 | End: 2019-08-16

## 2019-08-06 ASSESSMENT — ENCOUNTER SYMPTOMS
MYALGIAS: 0
SORE THROAT: 1
RHINORRHEA: 0
NECK PAIN: 0
COUGH: 1
DIAPHORESIS: 0
HEADACHES: 1
WHEEZING: 1
SWOLLEN GLANDS: 0
SHORTNESS OF BREATH: 1
HEARTBURN: 0
SWEATS: 1
CHILLS: 1
HOARSE VOICE: 1
WEIGHT LOSS: 0
FEVER: 0
SINUS PRESSURE: 1
HEMOPTYSIS: 0

## 2019-08-06 ASSESSMENT — PAIN SCALES - GENERAL: PAINLEVEL: 7=MODERATE-SEVERE PAIN

## 2019-08-06 NOTE — PROGRESS NOTES
Subjective:      Reta Lam is a 44 y.o. female who presents with Cough (x 5 days, chest, back and face ache, sore throat)        Reviewed past medical, surgical and family history. Reviewed prescription and OTC medications with patient in electronic health record today.     Allergies   Allergen Reactions   • Codeine Vomiting and Nausea         Cough   This is a new problem. The current episode started 1 to 4 weeks ago. The problem has been rapidly worsening. The problem occurs constantly. The cough is productive of sputum. Associated symptoms include chills, ear pain, headaches, nasal congestion, a sore throat, shortness of breath, sweats and wheezing. Pertinent negatives include no chest pain, ear congestion, fever, heartburn, hemoptysis, myalgias, postnasal drip, rash, rhinorrhea or weight loss. Nothing aggravates the symptoms. She has tried cool air, OTC cough suppressant and rest for the symptoms. The treatment provided no relief. Her past medical history is significant for environmental allergies. There is no history of asthma, bronchitis or pneumonia.   Sinusitis   This is a new problem. The current episode started 1 to 4 weeks ago. The problem has been gradually worsening since onset. Her pain is at a severity of 6/10. The pain is moderate. Associated symptoms include chills, coughing, ear pain, headaches, a hoarse voice, shortness of breath, sinus pressure, sneezing and a sore throat. Pertinent negatives include no congestion, diaphoresis, neck pain or swollen glands. Past treatments include lying down (OTC cough medication ). The treatment provided mild relief.       Review of Systems   Constitutional: Positive for chills. Negative for diaphoresis, fever and weight loss.   HENT: Positive for ear pain, hoarse voice, sinus pressure, sneezing and sore throat. Negative for congestion, postnasal drip and rhinorrhea.    Respiratory: Positive for cough, shortness of breath and wheezing. Negative for  "hemoptysis.    Cardiovascular: Negative for chest pain.   Gastrointestinal: Negative for heartburn.   Musculoskeletal: Negative for myalgias and neck pain.   Skin: Negative for rash.   Neurological: Positive for headaches.   Endo/Heme/Allergies: Positive for environmental allergies.          Objective:     /82 (BP Location: Left arm)   Pulse (!) 115   Temp 36.7 °C (98.1 °F) (Temporal)   Resp 20   Ht 1.727 m (5' 8\")   Wt 119.3 kg (263 lb)   SpO2 96%   BMI 39.99 kg/m²      Physical Exam   Constitutional: She is oriented to person, place, and time. Vital signs are normal. She appears well-developed and well-nourished. She is cooperative.  Non-toxic appearance. She appears ill. No distress.   HENT:   Head: Normocephalic.   Right Ear: Hearing, tympanic membrane, external ear and ear canal normal.   Left Ear: Hearing, tympanic membrane, external ear and ear canal normal.   Nose: Mucosal edema and sinus tenderness present. No rhinorrhea. Right sinus exhibits frontal sinus tenderness. Right sinus exhibits no maxillary sinus tenderness. Left sinus exhibits frontal sinus tenderness. Left sinus exhibits no maxillary sinus tenderness.   Mouth/Throat: Uvula is midline. Oropharyngeal exudate present.   Eyes: Pupils are equal, round, and reactive to light. Right conjunctiva is injected. Left conjunctiva is injected.   Neck: Trachea normal, normal range of motion, full passive range of motion without pain and phonation normal. Neck supple.   Cardiovascular: Regular rhythm, S1 normal, S2 normal, normal heart sounds, intact distal pulses and normal pulses. Tachycardia present. PMI is not displaced.   No murmur heard.  Pulmonary/Chest: Effort normal and breath sounds normal. No accessory muscle usage. She has no decreased breath sounds. She has no rhonchi.   Abdominal: Normal appearance.   Lymphadenopathy:     She has no cervical adenopathy.        Right: No supraclavicular adenopathy present.        Left: No " supraclavicular adenopathy present.   Neurological: She is alert and oriented to person, place, and time. Gait normal.   Skin: Skin is warm and dry. She is not diaphoretic.   Psychiatric: She has a normal mood and affect. Her speech is normal and behavior is normal. Cognition and memory are normal.   Nursing note and vitals reviewed.              Assessment/Plan:     1. Acute pansinusitis, recurrence not specified    - amoxicillin-clavulanate (AUGMENTIN) 875-125 MG Tab; Take 1 Tab by mouth 2 times a day for 10 days.  Dispense: 20 Tab; Refill: 0    2. Upper respiratory tract infection, unspecified type    - amoxicillin-clavulanate (AUGMENTIN) 875-125 MG Tab; Take 1 Tab by mouth 2 times a day for 10 days.  Dispense: 20 Tab; Refill: 0    3. Cough    FU with PCP or return to Urgent Care in 5-7 days if no improvement in symptoms, sooner if increased or worsening symptoms.   Humidifier at noc may be beneficial.   OTC antihistamine of choice - non-drowsy. Take as directed by   OTC fluticasone of choice or resume Dymysta RX- Take as directed by   Keep well hydrated    Resume all prior medications. Take as prescribed.

## 2019-08-10 ENCOUNTER — OFFICE VISIT (OUTPATIENT)
Dept: URGENT CARE | Facility: CLINIC | Age: 44
End: 2019-08-10
Payer: COMMERCIAL

## 2019-08-10 ENCOUNTER — APPOINTMENT (OUTPATIENT)
Dept: RADIOLOGY | Facility: IMAGING CENTER | Age: 44
End: 2019-08-10
Attending: NURSE PRACTITIONER
Payer: COMMERCIAL

## 2019-08-10 VITALS
SYSTOLIC BLOOD PRESSURE: 124 MMHG | WEIGHT: 245 LBS | HEART RATE: 74 BPM | DIASTOLIC BLOOD PRESSURE: 76 MMHG | TEMPERATURE: 96.5 F | BODY MASS INDEX: 37.13 KG/M2 | HEIGHT: 68 IN | RESPIRATION RATE: 14 BRPM | OXYGEN SATURATION: 96 %

## 2019-08-10 DIAGNOSIS — R05.9 COUGH: ICD-10-CM

## 2019-08-10 DIAGNOSIS — J16.8 PNEUMONIA DUE TO OTHER SPECIFIED INFECTIOUS ORGANISMS: ICD-10-CM

## 2019-08-10 PROCEDURE — 71046 X-RAY EXAM CHEST 2 VIEWS: CPT | Mod: TC | Performed by: NURSE PRACTITIONER

## 2019-08-10 PROCEDURE — 99214 OFFICE O/P EST MOD 30 MIN: CPT | Performed by: NURSE PRACTITIONER

## 2019-08-10 RX ORDER — AZITHROMYCIN 250 MG/1
TABLET, FILM COATED ORAL
Qty: 6 TAB | Refills: 0 | Status: ON HOLD | OUTPATIENT
Start: 2019-08-10 | End: 2019-09-08

## 2019-08-10 RX ORDER — METHYLPREDNISOLONE 4 MG/1
TABLET ORAL
Qty: 1 KIT | Refills: 0 | Status: ON HOLD | OUTPATIENT
Start: 2019-08-10 | End: 2019-09-08

## 2019-08-10 ASSESSMENT — ENCOUNTER SYMPTOMS
DIAPHORESIS: 1
EYE DISCHARGE: 0
COUGH: 1
CHILLS: 1
FEVER: 0
DIARRHEA: 0
NAUSEA: 0
HEADACHES: 0
WHEEZING: 0
SHORTNESS OF BREATH: 0
ORTHOPNEA: 0
MYALGIAS: 0
SORE THROAT: 0
SPUTUM PRODUCTION: 0

## 2019-08-10 NOTE — PROGRESS NOTES
Subjective:      Reta Lam is a 44 y.o. female who presents with Cough (cough and sweating x 6 days)            HPI Recurrent. 44 year old female with cough and am sweats/pm chills for 6 days. Cough is dry, no hemoptysis, abdominal pain or chest pain. She has some mild nausea. She was seen on Tuesday and rx'd augmentin, feels worse with increase in cough, low energy. Denies fever, diarrhea, nasal congestion or sore throat.   Codeine  Current Outpatient Medications on File Prior to Visit   Medication Sig Dispense Refill   • amoxicillin-clavulanate (AUGMENTIN) 875-125 MG Tab Take 1 Tab by mouth 2 times a day for 10 days. 20 Tab 0   • nortriptyline (PAMELOR) 25 MG Cap TAKE ONE CAPSULE BY MOUTH FOUR TIMES A  Cap 3   • propranolol CR (INDERAL LA) 80 MG CAPSULE SR 24 HR Take 1 Cap by mouth every day. 30 Cap 11   • losartan (COZAAR) 50 MG Tab Take 50 mg by mouth every day.     • ibuprofen (MOTRIN) 200 MG Tab Take 200 mg by mouth every 6 hours as needed.     • Multiple Vitamin (MULTI-VITAMIN PO) Take  by mouth.     • Levonorgestrel (MIRENA, 52 MG, IU) by Intrauterine route.     • Biotin 5000 MCG Cap Take  by mouth every day.     • omeprazole (PRILOSEC) 20 MG delayed-release capsule Take 20 mg by mouth every evening.     • benzonatate (TESSALON) 100 MG Cap Take 1 Cap by mouth 3 times a day as needed for Cough. (Patient not taking: Reported on 8/10/2019) 30 Cap 0   • nortriptyline (PAMELOR) 10 MG Cap Take 10 mg by mouth every evening.     • zolmitriptan (ZOMIG) 5 MG tablet Take 1 Tab by mouth as needed for Migraine. (Patient not taking: Reported on 8/10/2019) 10 Tab 11   • polymixin-trimethoprim (POLYTRIM) 48241-5.1 UNIT/ML-% Solution Place 1 Drop in both eyes every 4 hours. (Patient not taking: Reported on 8/6/2019) 10 mL 0   • FEVERFEW PO Take  by mouth.     • Cholecalciferol (VITAMIN D3) 5000 UNITS Cap Take 1 Cap by mouth every day.     • Cetirizine HCl (ZYRTEC PO) Take  by mouth every day.       No  current facility-administered medications on file prior to visit.      Social History     Socioeconomic History   • Marital status:      Spouse name: Not on file   • Number of children: Not on file   • Years of education: Not on file   • Highest education level: Not on file   Occupational History   • Not on file   Social Needs   • Financial resource strain: Not on file   • Food insecurity:     Worry: Not on file     Inability: Not on file   • Transportation needs:     Medical: Not on file     Non-medical: Not on file   Tobacco Use   • Smoking status: Never Smoker   • Smokeless tobacco: Never Used   Substance and Sexual Activity   • Alcohol use: Yes     Comment: 4 per week   • Drug use: No   • Sexual activity: Not on file   Lifestyle   • Physical activity:     Days per week: Not on file     Minutes per session: Not on file   • Stress: Not on file   Relationships   • Social connections:     Talks on phone: Not on file     Gets together: Not on file     Attends Yazidi service: Not on file     Active member of club or organization: Not on file     Attends meetings of clubs or organizations: Not on file     Relationship status: Not on file   • Intimate partner violence:     Fear of current or ex partner: Not on file     Emotionally abused: Not on file     Physically abused: Not on file     Forced sexual activity: Not on file   Other Topics Concern   • Not on file   Social History Narrative   • Not on file     Breast Cancer-related family history is not on file.      Review of Systems   Constitutional: Positive for chills, diaphoresis and malaise/fatigue. Negative for fever.   HENT: Negative for congestion and sore throat.    Eyes: Negative for discharge.   Respiratory: Positive for cough. Negative for sputum production, shortness of breath and wheezing.    Cardiovascular: Negative for chest pain and orthopnea.   Gastrointestinal: Negative for diarrhea and nausea.   Musculoskeletal: Negative for myalgias.  "  Neurological: Negative for headaches.   Endo/Heme/Allergies: Negative for environmental allergies.          Objective:     /76 (BP Location: Left arm, Patient Position: Sitting, BP Cuff Size: Large adult)   Pulse 74   Temp 35.8 °C (96.5 °F) (Temporal)   Resp 14   Ht 1.727 m (5' 8\")   Wt 111.1 kg (245 lb)   SpO2 96%   BMI 37.25 kg/m²      Physical Exam   Constitutional: She is oriented to person, place, and time. She appears well-developed and well-nourished. No distress.   HENT:   Head: Normocephalic.   Right Ear: External ear normal.   Left Ear: External ear normal.   Nose: Mucosal edema and rhinorrhea present.   Mouth/Throat: Oropharynx is clear and moist. No posterior oropharyngeal erythema.   Eyes: Conjunctivae are normal. Right eye exhibits no discharge. Left eye exhibits no discharge.   Neck: Normal range of motion. Neck supple.   Cardiovascular: Normal rate, regular rhythm and normal heart sounds.   Pulmonary/Chest: Effort normal and breath sounds normal. No stridor. No respiratory distress. She has no wheezes.   Musculoskeletal: Normal range of motion.   Lymphadenopathy:     She has no cervical adenopathy.   Neurological: She is alert and oriented to person, place, and time.   Skin: Skin is warm and dry.   Psychiatric: She has a normal mood and affect. Her behavior is normal. Thought content normal.   Nursing note and vitals reviewed.              Assessment/Plan:     1. Pneumonia due to other specified infectious organisms  cefTRIAXone (ROCEPHIN) 1 g, lidocaine (XYLOCAINE) 1 % 3.6 mL for IM use    azithromycin (ZITHROMAX) 250 MG Tab    methylPREDNISolone (MEDROL DOSEPAK) 4 MG Tablet Therapy Pack   2. Cough  DX-CHEST-2 VIEWS     Moderate sized mid right lobe pneumonia.  Stop augmentin.  z-pack and rocephin.  Medrol.  She has follow up with PCP on Friday.  Differential diagnosis, natural history, supportive care, and indications for immediate follow-up discussed at length.     "

## 2019-08-14 ENCOUNTER — HOSPITAL ENCOUNTER (OUTPATIENT)
Dept: LAB | Facility: MEDICAL CENTER | Age: 44
End: 2019-08-14
Attending: NURSE PRACTITIONER
Payer: COMMERCIAL

## 2019-08-14 LAB
ALBUMIN SERPL BCP-MCNC: 4 G/DL (ref 3.2–4.9)
ALBUMIN/GLOB SERPL: 1.2 G/DL
ALP SERPL-CCNC: 59 U/L (ref 30–99)
ALT SERPL-CCNC: 30 U/L (ref 2–50)
ANION GAP SERPL CALC-SCNC: 8 MMOL/L (ref 0–11.9)
APPEARANCE UR: CLEAR
AST SERPL-CCNC: 18 U/L (ref 12–45)
BACTERIA #/AREA URNS HPF: NEGATIVE /HPF
BASOPHILS # BLD AUTO: 1.1 % (ref 0–1.8)
BASOPHILS # BLD: 0.12 K/UL (ref 0–0.12)
BILIRUB SERPL-MCNC: 0.3 MG/DL (ref 0.1–1.5)
BILIRUB UR QL STRIP.AUTO: NEGATIVE
BUN SERPL-MCNC: 15 MG/DL (ref 8–22)
CALCIUM SERPL-MCNC: 9.8 MG/DL (ref 8.5–10.5)
CHLORIDE SERPL-SCNC: 104 MMOL/L (ref 96–112)
CHOLEST SERPL-MCNC: 225 MG/DL (ref 100–199)
CO2 SERPL-SCNC: 27 MMOL/L (ref 20–33)
COLOR UR: YELLOW
CREAT SERPL-MCNC: 0.89 MG/DL (ref 0.5–1.4)
EOSINOPHIL # BLD AUTO: 0.13 K/UL (ref 0–0.51)
EOSINOPHIL NFR BLD: 1.2 % (ref 0–6.9)
EPI CELLS #/AREA URNS HPF: ABNORMAL /HPF
ERYTHROCYTE [DISTWIDTH] IN BLOOD BY AUTOMATED COUNT: 46.2 FL (ref 35.9–50)
GLOBULIN SER CALC-MCNC: 3.4 G/DL (ref 1.9–3.5)
GLUCOSE SERPL-MCNC: 91 MG/DL (ref 65–99)
GLUCOSE UR STRIP.AUTO-MCNC: NEGATIVE MG/DL
HCT VFR BLD AUTO: 43.1 % (ref 37–47)
HDLC SERPL-MCNC: 41 MG/DL
HGB BLD-MCNC: 13.6 G/DL (ref 12–16)
HYALINE CASTS #/AREA URNS LPF: ABNORMAL /LPF
IMM GRANULOCYTES # BLD AUTO: 0.52 K/UL (ref 0–0.11)
IMM GRANULOCYTES NFR BLD AUTO: 5 % (ref 0–0.9)
KETONES UR STRIP.AUTO-MCNC: NEGATIVE MG/DL
LDLC SERPL CALC-MCNC: 157 MG/DL
LEUKOCYTE ESTERASE UR QL STRIP.AUTO: ABNORMAL
LYMPHOCYTES # BLD AUTO: 3.18 K/UL (ref 1–4.8)
LYMPHOCYTES NFR BLD: 30.4 % (ref 22–41)
MCH RBC QN AUTO: 30 PG (ref 27–33)
MCHC RBC AUTO-ENTMCNC: 31.6 G/DL (ref 33.6–35)
MCV RBC AUTO: 94.9 FL (ref 81.4–97.8)
MICRO URNS: ABNORMAL
MONOCYTES # BLD AUTO: 0.98 K/UL (ref 0–0.85)
MONOCYTES NFR BLD AUTO: 9.4 % (ref 0–13.4)
NEUTROPHILS # BLD AUTO: 5.54 K/UL (ref 2–7.15)
NEUTROPHILS NFR BLD: 52.9 % (ref 44–72)
NITRITE UR QL STRIP.AUTO: NEGATIVE
NRBC # BLD AUTO: 0.02 K/UL
NRBC BLD-RTO: 0.2 /100 WBC
PH UR STRIP.AUTO: 6.5 [PH] (ref 5–8)
PLATELET # BLD AUTO: 317 K/UL (ref 164–446)
PMV BLD AUTO: 9.7 FL (ref 9–12.9)
POTASSIUM SERPL-SCNC: 4.7 MMOL/L (ref 3.6–5.5)
PROT SERPL-MCNC: 7.4 G/DL (ref 6–8.2)
PROT UR QL STRIP: NEGATIVE MG/DL
RBC # BLD AUTO: 4.54 M/UL (ref 4.2–5.4)
RBC # URNS HPF: ABNORMAL /HPF
RBC UR QL AUTO: NEGATIVE
SODIUM SERPL-SCNC: 139 MMOL/L (ref 135–145)
SP GR UR STRIP.AUTO: 1.02
TRIGL SERPL-MCNC: 135 MG/DL (ref 0–149)
UROBILINOGEN UR STRIP.AUTO-MCNC: 0.2 MG/DL
WBC # BLD AUTO: 10.5 K/UL (ref 4.8–10.8)
WBC #/AREA URNS HPF: ABNORMAL /HPF
YEAST BUDDING URNS QL: PRESENT /HPF

## 2019-08-14 PROCEDURE — 80053 COMPREHEN METABOLIC PANEL: CPT

## 2019-08-14 PROCEDURE — 82306 VITAMIN D 25 HYDROXY: CPT

## 2019-08-14 PROCEDURE — 81001 URINALYSIS AUTO W/SCOPE: CPT

## 2019-08-14 PROCEDURE — 80061 LIPID PANEL: CPT

## 2019-08-14 PROCEDURE — 36415 COLL VENOUS BLD VENIPUNCTURE: CPT

## 2019-08-14 PROCEDURE — 85025 COMPLETE CBC W/AUTO DIFF WBC: CPT

## 2019-08-14 PROCEDURE — 84443 ASSAY THYROID STIM HORMONE: CPT

## 2019-08-15 LAB
25(OH)D3 SERPL-MCNC: 58 NG/ML (ref 30–100)
TSH SERPL DL<=0.005 MIU/L-ACNC: 1.39 UIU/ML (ref 0.38–5.33)

## 2019-08-21 ENCOUNTER — HOSPITAL ENCOUNTER (OUTPATIENT)
Dept: RADIOLOGY | Facility: MEDICAL CENTER | Age: 44
End: 2019-08-21
Attending: NURSE PRACTITIONER
Payer: COMMERCIAL

## 2019-08-21 DIAGNOSIS — B59 PNEUMONIA OF RIGHT MIDDLE LOBE DUE TO PNEUMOCYSTIS JIROVECII (HCC): ICD-10-CM

## 2019-08-21 PROCEDURE — 71046 X-RAY EXAM CHEST 2 VIEWS: CPT

## 2019-09-08 ENCOUNTER — HOSPITAL ENCOUNTER (INPATIENT)
Facility: MEDICAL CENTER | Age: 44
LOS: 1 days | DRG: 871 | End: 2019-09-09
Attending: EMERGENCY MEDICINE | Admitting: INTERNAL MEDICINE
Payer: COMMERCIAL

## 2019-09-08 ENCOUNTER — APPOINTMENT (OUTPATIENT)
Dept: RADIOLOGY | Facility: MEDICAL CENTER | Age: 44
DRG: 871 | End: 2019-09-08
Attending: EMERGENCY MEDICINE
Payer: COMMERCIAL

## 2019-09-08 PROBLEM — N17.9 ACUTE KIDNEY INJURY (HCC): Status: ACTIVE | Noted: 2019-09-08

## 2019-09-08 PROBLEM — R73.9 HYPERGLYCEMIA: Status: ACTIVE | Noted: 2019-09-08

## 2019-09-08 PROBLEM — I47.10 SVT (SUPRAVENTRICULAR TACHYCARDIA) (HCC): Status: ACTIVE | Noted: 2019-09-08

## 2019-09-08 PROBLEM — A41.9 SEPSIS (HCC): Status: ACTIVE | Noted: 2019-09-08

## 2019-09-08 PROBLEM — J18.9 PNEUMONIA: Status: ACTIVE | Noted: 2019-09-08

## 2019-09-08 PROBLEM — I10 HYPERTENSION: Status: ACTIVE | Noted: 2019-09-08

## 2019-09-08 PROBLEM — K21.9 GERD (GASTROESOPHAGEAL REFLUX DISEASE): Status: ACTIVE | Noted: 2019-09-08

## 2019-09-08 LAB
ALBUMIN SERPL BCP-MCNC: 4.3 G/DL (ref 3.2–4.9)
ALBUMIN/GLOB SERPL: 1.3 G/DL
ALP SERPL-CCNC: 74 U/L (ref 30–99)
ALT SERPL-CCNC: 55 U/L (ref 2–50)
AMPHET UR QL SCN: NEGATIVE
ANION GAP SERPL CALC-SCNC: 13 MMOL/L (ref 0–11.9)
APPEARANCE UR: CLEAR
AST SERPL-CCNC: 35 U/L (ref 12–45)
BARBITURATES UR QL SCN: NEGATIVE
BASOPHILS # BLD AUTO: 0.7 % (ref 0–1.8)
BASOPHILS # BLD: 0.12 K/UL (ref 0–0.12)
BENZODIAZ UR QL SCN: NEGATIVE
BILIRUB SERPL-MCNC: 0.5 MG/DL (ref 0.1–1.5)
BILIRUB UR QL STRIP.AUTO: NEGATIVE
BUN SERPL-MCNC: 10 MG/DL (ref 8–22)
BZE UR QL SCN: NEGATIVE
CALCIUM SERPL-MCNC: 9.9 MG/DL (ref 8.4–10.2)
CANNABINOIDS UR QL SCN: NEGATIVE
CHLORIDE SERPL-SCNC: 100 MMOL/L (ref 96–112)
CO2 SERPL-SCNC: 25 MMOL/L (ref 20–33)
COLOR UR: YELLOW
CREAT SERPL-MCNC: 1.03 MG/DL (ref 0.5–1.4)
EKG IMPRESSION: NORMAL
EKG IMPRESSION: NORMAL
EOSINOPHIL # BLD AUTO: 0.2 K/UL (ref 0–0.51)
EOSINOPHIL NFR BLD: 1.2 % (ref 0–6.9)
ERYTHROCYTE [DISTWIDTH] IN BLOOD BY AUTOMATED COUNT: 50.4 FL (ref 35.9–50)
GLOBULIN SER CALC-MCNC: 3.4 G/DL (ref 1.9–3.5)
GLUCOSE SERPL-MCNC: 146 MG/DL (ref 65–99)
GLUCOSE UR STRIP.AUTO-MCNC: NEGATIVE MG/DL
HCG SERPL QL: NEGATIVE
HCT VFR BLD AUTO: 47 % (ref 37–47)
HGB BLD-MCNC: 15.7 G/DL (ref 12–16)
IMM GRANULOCYTES # BLD AUTO: 0.12 K/UL (ref 0–0.11)
IMM GRANULOCYTES NFR BLD AUTO: 0.7 % (ref 0–0.9)
INR PPP: 0.94 (ref 0.87–1.13)
KETONES UR STRIP.AUTO-MCNC: NEGATIVE MG/DL
LACTATE BLD-SCNC: 1.4 MMOL/L (ref 0.5–2)
LACTATE BLD-SCNC: 1.5 MMOL/L (ref 0.5–2)
LACTATE BLD-SCNC: 2.5 MMOL/L (ref 0.5–2)
LEUKOCYTE ESTERASE UR QL STRIP.AUTO: NEGATIVE
LYMPHOCYTES # BLD AUTO: 3.5 K/UL (ref 1–4.8)
LYMPHOCYTES NFR BLD: 20.1 % (ref 22–41)
MCH RBC QN AUTO: 31.5 PG (ref 27–33)
MCHC RBC AUTO-ENTMCNC: 33.4 G/DL (ref 33.6–35)
MCV RBC AUTO: 94.4 FL (ref 81.4–97.8)
METHADONE UR QL SCN: NEGATIVE
MICRO URNS: NORMAL
MONOCYTES # BLD AUTO: 1.49 K/UL (ref 0–0.85)
MONOCYTES NFR BLD AUTO: 8.6 % (ref 0–13.4)
MRSA DNA SPEC QL NAA+PROBE: NORMAL
NEUTROPHILS # BLD AUTO: 11.95 K/UL (ref 2–7.15)
NEUTROPHILS NFR BLD: 68.7 % (ref 44–72)
NITRITE UR QL STRIP.AUTO: NEGATIVE
NRBC # BLD AUTO: 0 K/UL
NRBC BLD-RTO: 0 /100 WBC
OPIATES UR QL SCN: NEGATIVE
OXYCODONE UR QL SCN: NEGATIVE
PCP UR QL SCN: NEGATIVE
PH UR STRIP.AUTO: 6.5 [PH] (ref 5–8)
PLATELET # BLD AUTO: 255 K/UL (ref 164–446)
PMV BLD AUTO: 10.4 FL (ref 9–12.9)
POTASSIUM SERPL-SCNC: 4.8 MMOL/L (ref 3.6–5.5)
PROCALCITONIN SERPL-MCNC: 0.13 NG/ML
PROPOXYPH UR QL SCN: NEGATIVE
PROT SERPL-MCNC: 7.7 G/DL (ref 6–8.2)
PROT UR QL STRIP: NEGATIVE MG/DL
PROTHROMBIN TIME: 12.7 SEC (ref 12–14.6)
RBC # BLD AUTO: 4.98 M/UL (ref 4.2–5.4)
RBC UR QL AUTO: NEGATIVE
SIGNIFICANT IND 70042: NORMAL
SITE SITE: NORMAL
SODIUM SERPL-SCNC: 138 MMOL/L (ref 135–145)
SOURCE SOURCE: NORMAL
SP GR UR STRIP.AUTO: <=1.005
T4 FREE SERPL-MCNC: 1 NG/DL (ref 0.58–1.64)
TROPONIN T SERPL-MCNC: 29 NG/L (ref 6–19)
TROPONIN T SERPL-MCNC: 39 NG/L (ref 6–19)
TROPONIN T SERPL-MCNC: 40 NG/L (ref 6–19)
TSH SERPL DL<=0.005 MIU/L-ACNC: 2.76 UIU/ML (ref 0.38–5.33)
WBC # BLD AUTO: 17.4 K/UL (ref 4.8–10.8)

## 2019-09-08 PROCEDURE — 700102 HCHG RX REV CODE 250 W/ 637 OVERRIDE(OP): Performed by: INTERNAL MEDICINE

## 2019-09-08 PROCEDURE — 80307 DRUG TEST PRSMV CHEM ANLYZR: CPT

## 2019-09-08 PROCEDURE — 84145 PROCALCITONIN (PCT): CPT

## 2019-09-08 PROCEDURE — 93005 ELECTROCARDIOGRAM TRACING: CPT | Performed by: EMERGENCY MEDICINE

## 2019-09-08 PROCEDURE — 71045 X-RAY EXAM CHEST 1 VIEW: CPT

## 2019-09-08 PROCEDURE — 84443 ASSAY THYROID STIM HORMONE: CPT

## 2019-09-08 PROCEDURE — 99223 1ST HOSP IP/OBS HIGH 75: CPT | Mod: 25 | Performed by: INTERNAL MEDICINE

## 2019-09-08 PROCEDURE — 700105 HCHG RX REV CODE 258: Performed by: EMERGENCY MEDICINE

## 2019-09-08 PROCEDURE — 85610 PROTHROMBIN TIME: CPT

## 2019-09-08 PROCEDURE — 700105 HCHG RX REV CODE 258: Performed by: INTERNAL MEDICINE

## 2019-09-08 PROCEDURE — 700111 HCHG RX REV CODE 636 W/ 250 OVERRIDE (IP)

## 2019-09-08 PROCEDURE — 99285 EMERGENCY DEPT VISIT HI MDM: CPT

## 2019-09-08 PROCEDURE — 84439 ASSAY OF FREE THYROXINE: CPT

## 2019-09-08 PROCEDURE — 99356 PR PROLONGED SVC I/P OR OBS SETTING 1ST HOUR: CPT | Performed by: HOSPITALIST

## 2019-09-08 PROCEDURE — 700117 HCHG RX CONTRAST REV CODE 255: Performed by: EMERGENCY MEDICINE

## 2019-09-08 PROCEDURE — 84484 ASSAY OF TROPONIN QUANT: CPT

## 2019-09-08 PROCEDURE — 700102 HCHG RX REV CODE 250 W/ 637 OVERRIDE(OP): Performed by: HOSPITALIST

## 2019-09-08 PROCEDURE — 71275 CT ANGIOGRAPHY CHEST: CPT

## 2019-09-08 PROCEDURE — 81003 URINALYSIS AUTO W/O SCOPE: CPT

## 2019-09-08 PROCEDURE — 84703 CHORIONIC GONADOTROPIN ASSAY: CPT

## 2019-09-08 PROCEDURE — 87040 BLOOD CULTURE FOR BACTERIA: CPT

## 2019-09-08 PROCEDURE — 80053 COMPREHEN METABOLIC PANEL: CPT

## 2019-09-08 PROCEDURE — A9270 NON-COVERED ITEM OR SERVICE: HCPCS | Performed by: INTERNAL MEDICINE

## 2019-09-08 PROCEDURE — 87641 MR-STAPH DNA AMP PROBE: CPT

## 2019-09-08 PROCEDURE — 770020 HCHG ROOM/CARE - TELE (206)

## 2019-09-08 PROCEDURE — A9270 NON-COVERED ITEM OR SERVICE: HCPCS | Performed by: HOSPITALIST

## 2019-09-08 PROCEDURE — 700105 HCHG RX REV CODE 258: Performed by: HOSPITALIST

## 2019-09-08 PROCEDURE — 700111 HCHG RX REV CODE 636 W/ 250 OVERRIDE (IP): Performed by: EMERGENCY MEDICINE

## 2019-09-08 PROCEDURE — 96365 THER/PROPH/DIAG IV INF INIT: CPT

## 2019-09-08 PROCEDURE — 700102 HCHG RX REV CODE 250 W/ 637 OVERRIDE(OP): Performed by: EMERGENCY MEDICINE

## 2019-09-08 PROCEDURE — A9270 NON-COVERED ITEM OR SERVICE: HCPCS | Performed by: EMERGENCY MEDICINE

## 2019-09-08 PROCEDURE — 700111 HCHG RX REV CODE 636 W/ 250 OVERRIDE (IP): Performed by: HOSPITALIST

## 2019-09-08 PROCEDURE — 83605 ASSAY OF LACTIC ACID: CPT

## 2019-09-08 PROCEDURE — 700111 HCHG RX REV CODE 636 W/ 250 OVERRIDE (IP): Performed by: INTERNAL MEDICINE

## 2019-09-08 PROCEDURE — 85025 COMPLETE CBC W/AUTO DIFF WBC: CPT

## 2019-09-08 RX ORDER — PROPRANOLOL HCL 60 MG
60 CAPSULE, EXTENDED RELEASE 24HR ORAL
Status: DISCONTINUED | OUTPATIENT
Start: 2019-09-08 | End: 2019-09-09 | Stop reason: HOSPADM

## 2019-09-08 RX ORDER — PROPRANOLOL HCL 60 MG
60 CAPSULE, EXTENDED RELEASE 24HR ORAL DAILY
COMMUNITY
End: 2020-01-23 | Stop reason: SDUPTHER

## 2019-09-08 RX ORDER — ONDANSETRON 2 MG/ML
4 INJECTION INTRAMUSCULAR; INTRAVENOUS EVERY 4 HOURS PRN
Status: DISCONTINUED | OUTPATIENT
Start: 2019-09-08 | End: 2019-09-09 | Stop reason: HOSPADM

## 2019-09-08 RX ORDER — PROMETHAZINE HYDROCHLORIDE 25 MG/1
12.5-25 SUPPOSITORY RECTAL EVERY 4 HOURS PRN
Status: DISCONTINUED | OUTPATIENT
Start: 2019-09-08 | End: 2019-09-09 | Stop reason: HOSPADM

## 2019-09-08 RX ORDER — ALBUTEROL SULFATE 90 UG/1
2 AEROSOL, METERED RESPIRATORY (INHALATION) EVERY 6 HOURS PRN
COMMUNITY
End: 2020-05-18

## 2019-09-08 RX ORDER — POLYETHYLENE GLYCOL 3350 17 G/17G
1 POWDER, FOR SOLUTION ORAL
Status: DISCONTINUED | OUTPATIENT
Start: 2019-09-08 | End: 2019-09-09 | Stop reason: HOSPADM

## 2019-09-08 RX ORDER — ENALAPRILAT 1.25 MG/ML
1.25 INJECTION INTRAVENOUS EVERY 6 HOURS PRN
Status: DISCONTINUED | OUTPATIENT
Start: 2019-09-08 | End: 2019-09-09 | Stop reason: HOSPADM

## 2019-09-08 RX ORDER — NORTRIPTYLINE HYDROCHLORIDE 25 MG/1
50 CAPSULE ORAL NIGHTLY
Status: ON HOLD | COMMUNITY
End: 2019-09-09 | Stop reason: SDUPTHER

## 2019-09-08 RX ORDER — NORTRIPTYLINE HYDROCHLORIDE 25 MG/1
25 CAPSULE ORAL NIGHTLY
Status: DISCONTINUED | OUTPATIENT
Start: 2019-09-08 | End: 2019-09-09 | Stop reason: HOSPADM

## 2019-09-08 RX ORDER — CETIRIZINE HYDROCHLORIDE 10 MG/1
10 TABLET ORAL
COMMUNITY
End: 2021-03-30

## 2019-09-08 RX ORDER — SODIUM CHLORIDE 9 MG/ML
INJECTION, SOLUTION INTRAVENOUS CONTINUOUS
Status: DISCONTINUED | OUTPATIENT
Start: 2019-09-08 | End: 2019-09-09 | Stop reason: HOSPADM

## 2019-09-08 RX ORDER — ADENOSINE 3 MG/ML
INJECTION, SOLUTION INTRAVENOUS
Status: COMPLETED
Start: 2019-09-08 | End: 2019-09-08

## 2019-09-08 RX ORDER — PROCHLORPERAZINE EDISYLATE 5 MG/ML
5-10 INJECTION INTRAMUSCULAR; INTRAVENOUS EVERY 4 HOURS PRN
Status: DISCONTINUED | OUTPATIENT
Start: 2019-09-08 | End: 2019-09-09 | Stop reason: HOSPADM

## 2019-09-08 RX ORDER — PROPRANOLOL HYDROCHLORIDE 80 MG/1
80 CAPSULE, EXTENDED RELEASE ORAL DAILY
Status: DISCONTINUED | OUTPATIENT
Start: 2019-09-08 | End: 2019-09-08

## 2019-09-08 RX ORDER — GUAIFENESIN 600 MG/1
600 TABLET, EXTENDED RELEASE ORAL EVERY 12 HOURS
Status: DISCONTINUED | OUTPATIENT
Start: 2019-09-08 | End: 2019-09-09 | Stop reason: HOSPADM

## 2019-09-08 RX ORDER — SODIUM CHLORIDE, SODIUM LACTATE, POTASSIUM CHLORIDE, AND CALCIUM CHLORIDE .6; .31; .03; .02 G/100ML; G/100ML; G/100ML; G/100ML
1000 INJECTION, SOLUTION INTRAVENOUS
Status: DISCONTINUED | OUTPATIENT
Start: 2019-09-08 | End: 2019-09-09 | Stop reason: HOSPADM

## 2019-09-08 RX ORDER — ASPIRIN 81 MG/1
324 TABLET, CHEWABLE ORAL ONCE
Status: COMPLETED | OUTPATIENT
Start: 2019-09-08 | End: 2019-09-08

## 2019-09-08 RX ORDER — SODIUM CHLORIDE, SODIUM LACTATE, POTASSIUM CHLORIDE, AND CALCIUM CHLORIDE .6; .31; .03; .02 G/100ML; G/100ML; G/100ML; G/100ML
30 INJECTION, SOLUTION INTRAVENOUS
Status: DISCONTINUED | OUTPATIENT
Start: 2019-09-08 | End: 2019-09-09 | Stop reason: HOSPADM

## 2019-09-08 RX ORDER — BISACODYL 10 MG
10 SUPPOSITORY, RECTAL RECTAL
Status: DISCONTINUED | OUTPATIENT
Start: 2019-09-08 | End: 2019-09-09 | Stop reason: HOSPADM

## 2019-09-08 RX ORDER — PROMETHAZINE HYDROCHLORIDE 25 MG/1
12.5-25 TABLET ORAL EVERY 4 HOURS PRN
Status: DISCONTINUED | OUTPATIENT
Start: 2019-09-08 | End: 2019-09-09 | Stop reason: HOSPADM

## 2019-09-08 RX ORDER — DOXYCYCLINE 100 MG/1
100 TABLET ORAL EVERY 12 HOURS
Status: DISCONTINUED | OUTPATIENT
Start: 2019-09-08 | End: 2019-09-09 | Stop reason: HOSPADM

## 2019-09-08 RX ORDER — ONDANSETRON 2 MG/ML
INJECTION INTRAMUSCULAR; INTRAVENOUS
Status: COMPLETED
Start: 2019-09-08 | End: 2019-09-08

## 2019-09-08 RX ORDER — AMOXICILLIN 250 MG
2 CAPSULE ORAL 2 TIMES DAILY
Status: DISCONTINUED | OUTPATIENT
Start: 2019-09-08 | End: 2019-09-09 | Stop reason: HOSPADM

## 2019-09-08 RX ORDER — NORTRIPTYLINE HYDROCHLORIDE 10 MG/1
10 CAPSULE ORAL 4 TIMES DAILY
Status: DISCONTINUED | OUTPATIENT
Start: 2019-09-08 | End: 2019-09-08

## 2019-09-08 RX ORDER — OMEPRAZOLE 20 MG/1
20 CAPSULE, DELAYED RELEASE ORAL EVERY EVENING
Status: DISCONTINUED | OUTPATIENT
Start: 2019-09-08 | End: 2019-09-09 | Stop reason: HOSPADM

## 2019-09-08 RX ORDER — NORTRIPTYLINE HYDROCHLORIDE 10 MG/1
10 CAPSULE ORAL NIGHTLY
Status: DISCONTINUED | OUTPATIENT
Start: 2019-09-08 | End: 2019-09-08

## 2019-09-08 RX ORDER — IBUPROFEN 200 MG
400 TABLET ORAL EVERY 6 HOURS PRN
Status: DISCONTINUED | OUTPATIENT
Start: 2019-09-08 | End: 2019-09-09 | Stop reason: HOSPADM

## 2019-09-08 RX ORDER — ACETAMINOPHEN 325 MG/1
650 TABLET ORAL EVERY 6 HOURS PRN
Status: DISCONTINUED | OUTPATIENT
Start: 2019-09-08 | End: 2019-09-09 | Stop reason: HOSPADM

## 2019-09-08 RX ORDER — ONDANSETRON 4 MG/1
4 TABLET, ORALLY DISINTEGRATING ORAL EVERY 4 HOURS PRN
Status: DISCONTINUED | OUTPATIENT
Start: 2019-09-08 | End: 2019-09-09 | Stop reason: HOSPADM

## 2019-09-08 RX ADMIN — NORTRIPTYLINE HYDROCHLORIDE 25 MG: 25 CAPSULE ORAL at 21:01

## 2019-09-08 RX ADMIN — GUAIFENESIN 600 MG: 600 TABLET, EXTENDED RELEASE ORAL at 17:52

## 2019-09-08 RX ADMIN — AMPICILLIN SODIUM AND SULBACTAM SODIUM 3 G: 2; 1 INJECTION, POWDER, FOR SOLUTION INTRAMUSCULAR; INTRAVENOUS at 11:30

## 2019-09-08 RX ADMIN — SODIUM CHLORIDE: 9 INJECTION, SOLUTION INTRAVENOUS at 21:12

## 2019-09-08 RX ADMIN — GUAIFENESIN 600 MG: 600 TABLET, EXTENDED RELEASE ORAL at 11:02

## 2019-09-08 RX ADMIN — ENOXAPARIN SODIUM 40 MG: 100 INJECTION SUBCUTANEOUS at 11:01

## 2019-09-08 RX ADMIN — IOHEXOL 47 ML: 350 INJECTION, SOLUTION INTRAVENOUS at 04:33

## 2019-09-08 RX ADMIN — AMPICILLIN SODIUM AND SULBACTAM SODIUM 3 G: 2; 1 INJECTION, POWDER, FOR SOLUTION INTRAMUSCULAR; INTRAVENOUS at 17:51

## 2019-09-08 RX ADMIN — ASPIRIN 81 MG 324 MG: 81 TABLET ORAL at 04:04

## 2019-09-08 RX ADMIN — DOXYCYCLINE 100 MG: 100 TABLET, FILM COATED ORAL at 21:00

## 2019-09-08 RX ADMIN — ADENOSINE 6 MG: 3 INJECTION, SOLUTION INTRAVENOUS at 03:38

## 2019-09-08 RX ADMIN — DOXYCYCLINE 100 MG: 100 TABLET, FILM COATED ORAL at 11:02

## 2019-09-08 RX ADMIN — PIPERACILLIN AND TAZOBACTAM 3.38 G: 3; .375 INJECTION, POWDER, LYOPHILIZED, FOR SOLUTION INTRAVENOUS; PARENTERAL at 05:23

## 2019-09-08 RX ADMIN — PROPRANOLOL HYDROCHLORIDE 60 MG: 60 CAPSULE, EXTENDED RELEASE ORAL at 11:25

## 2019-09-08 RX ADMIN — VANCOMYCIN HYDROCHLORIDE 2750 MG: 500 INJECTION, POWDER, LYOPHILIZED, FOR SOLUTION INTRAVENOUS at 06:33

## 2019-09-08 RX ADMIN — IBUPROFEN 400 MG: 200 TABLET, FILM COATED ORAL at 09:57

## 2019-09-08 RX ADMIN — SODIUM CHLORIDE: 9 INJECTION, SOLUTION INTRAVENOUS at 11:36

## 2019-09-08 RX ADMIN — AMPICILLIN SODIUM AND SULBACTAM SODIUM 3 G: 2; 1 INJECTION, POWDER, FOR SOLUTION INTRAMUSCULAR; INTRAVENOUS at 23:39

## 2019-09-08 RX ADMIN — VANCOMYCIN HYDROCHLORIDE 1750 MG: 1 INJECTION, POWDER, LYOPHILIZED, FOR SOLUTION INTRAVENOUS at 21:02

## 2019-09-08 RX ADMIN — IBUPROFEN 400 MG: 200 TABLET, FILM COATED ORAL at 21:01

## 2019-09-08 RX ADMIN — ONDANSETRON 4 MG: 2 INJECTION INTRAMUSCULAR; INTRAVENOUS at 03:40

## 2019-09-08 ASSESSMENT — LIFESTYLE VARIABLES
EVER_SMOKED: NEVER
HOW MANY TIMES IN THE PAST YEAR HAVE YOU HAD 5 OR MORE DRINKS IN A DAY: 0
TOTAL SCORE: 0
TOTAL SCORE: 0
HAVE PEOPLE ANNOYED YOU BY CRITICIZING YOUR DRINKING: NO
EVER_SMOKED: NEVER
HAVE YOU EVER FELT YOU SHOULD CUT DOWN ON YOUR DRINKING: NO
CONSUMPTION TOTAL: POSITIVE
ON A TYPICAL DAY WHEN YOU DRINK ALCOHOL HOW MANY DRINKS DO YOU HAVE: 2
ALCOHOL_USE: YES
EVER FELT BAD OR GUILTY ABOUT YOUR DRINKING: NO
AVERAGE NUMBER OF DAYS PER WEEK YOU HAVE A DRINK CONTAINING ALCOHOL: 5
TOTAL SCORE: 0
DOES PATIENT WANT TO STOP DRINKING: NO
EVER HAD A DRINK FIRST THING IN THE MORNING TO STEADY YOUR NERVES TO GET RID OF A HANGOVER: NO

## 2019-09-08 ASSESSMENT — ENCOUNTER SYMPTOMS
EYE PAIN: 0
HEADACHES: 0
TINGLING: 0
STRIDOR: 0
SHORTNESS OF BREATH: 1
SPUTUM PRODUCTION: 0
NECK PAIN: 0
CHILLS: 0
FALLS: 0
FEVER: 0
DIZZINESS: 0
WEAKNESS: 0
ABDOMINAL PAIN: 0
CHILLS: 1
DEPRESSION: 0
SORE THROAT: 0
COUGH: 1
INSOMNIA: 0
VOMITING: 0
PALPITATIONS: 1
PALPITATIONS: 0
CONSTIPATION: 0
SHORTNESS OF BREATH: 0
BLURRED VISION: 0
LOSS OF CONSCIOUSNESS: 0
COUGH: 0
BACK PAIN: 0
DIARRHEA: 0
MYALGIAS: 0
NAUSEA: 0

## 2019-09-08 ASSESSMENT — PATIENT HEALTH QUESTIONNAIRE - PHQ9
SUM OF ALL RESPONSES TO PHQ9 QUESTIONS 1 AND 2: 0
1. LITTLE INTEREST OR PLEASURE IN DOING THINGS: NOT AT ALL
2. FEELING DOWN, DEPRESSED, IRRITABLE, OR HOPELESS: NOT AT ALL

## 2019-09-08 ASSESSMENT — COGNITIVE AND FUNCTIONAL STATUS - GENERAL
SUGGESTED CMS G CODE MODIFIER MOBILITY: CH
MOBILITY SCORE: 24
SUGGESTED CMS G CODE MODIFIER DAILY ACTIVITY: CH
DAILY ACTIVITIY SCORE: 24

## 2019-09-08 ASSESSMENT — COPD QUESTIONNAIRES
DURING THE PAST 4 WEEKS HOW MUCH DID YOU FEEL SHORT OF BREATH: SOME OF THE TIME
COPD SCREENING SCORE: 2
HAVE YOU SMOKED AT LEAST 100 CIGARETTES IN YOUR ENTIRE LIFE: NO/DON'T KNOW
DO YOU EVER COUGH UP ANY MUCUS OR PHLEGM?: YES, A FEW DAYS A WEEK OR MONTH

## 2019-09-08 NOTE — PROGRESS NOTES
Med rec updated and complete  Allergies reviewed  Pt reports that she finished a Z-pack on 8/25/2019.  Pt reports no antibiotics in the last 2 weeks

## 2019-09-08 NOTE — ASSESSMENT & PLAN NOTE
Has been on multiple meds.   Continue BB  On 50mg nortryptline- ?reason for svt  Decrease dose to 25.

## 2019-09-08 NOTE — ASSESSMENT & PLAN NOTE
-This is severe sepsis with the following associated acute organ dysfunction(s): acute kidney failure.   -Due to pneumonia  Change rocephin to unasyn. Continue vanco and doxycycline. She has failed several rounds of oral antibiotics.   -pending culture results   IV fluids  -Trend lactic acid

## 2019-09-08 NOTE — ASSESSMENT & PLAN NOTE
RML pneumonia I noted on CT  Refractory to outpatient antibiotics  Broad spectrum with MRSA coverage ongoing now.   With sepsis

## 2019-09-08 NOTE — ED NOTES
EKG performed in triage showed SVT; confirmed by Dr Goodson. Pt roomed immediately and MD @ bedside

## 2019-09-08 NOTE — PROGRESS NOTES
Report received from VENTURA Medina. Pt resting comfortably in bed. Declines any needs at this time. Call light within reach, bed low/locked, bed alarm on. Will continue to monitor.

## 2019-09-08 NOTE — PROGRESS NOTES
[facility] Medicine Daily Progress Note    Date of Service  9/8/2019    Chief Complaint  44 y.o. female admitted 9/8/2019 with a rapid heart rate.     Hospital Course    She was noted to be in SVT and to have a persistent multifocal pneumonia with related sepsis. She was given adenosine in the ED and treated for her sepsis. She had failed multiple rounds of outpatient antibiotics.       Interval Problem Update  Feeling improved since admission. I adjusted her antibiotics and discussed her TCA use with her, RN and pharmacy. I have decreased her dose from 50 to 25. . I reviewed all of her imaging.       Cta chest  1. No definite CT evidence of pulmonary embolism. Limited evaluation of the subsegmental branches due to poor contrast enhancement.    2.  Airspace opacities in the right lower lobe and right middle lobe, in keeping with pneumonia    Prolonged services today in addition to admission e/m time- I spent from 1204-1246pm today discussing this patient's treatment with her and the RN and treating her at the bedside.       Consultants/Specialty  none    Code Status  full    Disposition  Home once improved.     Review of Systems  Review of Systems   Constitutional: Positive for malaise/fatigue. Negative for chills and fever.   HENT: Negative for sore throat.    Eyes: Negative for blurred vision and pain.   Respiratory: Negative for cough and shortness of breath.    Cardiovascular: Negative for chest pain and palpitations.   Gastrointestinal: Negative for abdominal pain, nausea and vomiting.   Genitourinary: Negative for dysuria and urgency.   Musculoskeletal: Negative for back pain and neck pain.   Skin: Negative for itching and rash.   Neurological: Negative for dizziness, tingling and headaches.   Psychiatric/Behavioral: Negative for depression. The patient does not have insomnia.    All other systems reviewed and are negative.       Physical Exam  Temp:  [36 °C (96.8 °F)-36.7 °C (98 °F)] 36.7 °C (98 °F)  Pulse:   [100-214] 102  Resp:  [17-34] 19  BP: (105-140)/(46-80) 117/73  SpO2:  [96 %-99 %] 96 %    Physical Exam   Constitutional: She is oriented to person, place, and time. She appears well-developed and well-nourished. No distress.   Patient seen and examined  Discussed plan with RN   HENT:   Right Ear: External ear normal.   Left Ear: External ear normal.   Nose: Nose normal.   Eyes: Conjunctivae are normal. Right eye exhibits no discharge. Left eye exhibits no discharge.   Neck: No JVD present.   Cardiovascular: Regular rhythm and normal heart sounds.   No murmur heard.  Cap refill 2sec  Pulses 2+ throughout     Pulmonary/Chest: Effort normal. No stridor. No respiratory distress. She has no wheezes. She has rales.   Abdominal: Soft. Bowel sounds are normal. She exhibits no distension. There is no tenderness.   Musculoskeletal: She exhibits no edema or tenderness.   Neurological: She is alert and oriented to person, place, and time.   Skin: Skin is warm and dry. She is not diaphoretic. No erythema.   Normal skin  Color.    Psychiatric: She has a normal mood and affect. Her behavior is normal.   Nursing note and vitals reviewed.      Fluids    Intake/Output Summary (Last 24 hours) at 9/8/2019 1016  Last data filed at 9/8/2019 0900  Gross per 24 hour   Intake 100 ml   Output --   Net 100 ml       Laboratory  Recent Labs     09/08/19  0337   WBC 17.4*   RBC 4.98   HEMOGLOBIN 15.7   HEMATOCRIT 47.0   MCV 94.4   MCH 31.5   MCHC 33.4*   RDW 50.4*   PLATELETCT 255   MPV 10.4     Recent Labs     09/08/19  0337   SODIUM 138   POTASSIUM 4.8   CHLORIDE 100   CO2 25   GLUCOSE 146*   BUN 10   CREATININE 1.03   CALCIUM 9.9     Recent Labs     09/08/19  0654   INR 0.94               Imaging  CT-CTA CHEST PULMONARY ARTERY W/ RECONS   Final Result         1. No definite CT evidence of pulmonary embolism. Limited evaluation of the subsegmental branches due to poor contrast enhancement.      2.  Airspace opacities in the right lower lobe  and right middle lobe, in keeping with pneumonia      DX-CHEST-PORTABLE (1 VIEW)   Final Result         Worsening patchy opacity in the right middle lobe could relate to pneumonia.           Assessment/Plan  * SVT (supraventricular tachycardia) (HCC)- (present on admission)  Assessment & Plan  S/p adenosine  Now oin NSR  ??2/2 TCA therapy.   -also possibly due to sepsis    Sepsis (HCC)- (present on admission)  Assessment & Plan  -This is severe sepsis with the following associated acute organ dysfunction(s): acute kidney failure.   -Due to pneumonia  Change rocephin to unasyn. Continue vanco and doxycycline. She has failed several rounds of oral antibiotics.   -pending culture results   IV fluids  -Trend lactic acid      Pneumonia- (present on admission)  Assessment & Plan  RML pneumonia I noted on CT  Refractory to outpatient antibiotics  Broad spectrum with MRSA coverage ongoing now.   With sepsis      Hyperglycemia  Assessment & Plan  Check a1c. No DM hx in past. Possible reactive from sepsis.     GERD (gastroesophageal reflux disease)  Assessment & Plan  Continue ppi    Hypertension  Assessment & Plan  Continue meds    Acute kidney injury (HCC)- (present on admission)  Assessment & Plan  Trend with IV fluids and treat sepsis .    Chronic migraine- (present on admission)  Assessment & Plan  Has been on multiple meds.   Continue BB  On 50mg nortryptline- ?reason for svt  Decrease dose to 25.          VTE prophylaxis: scd

## 2019-09-08 NOTE — PROGRESS NOTES
Pt sitting up in bed eating. No complaints at this time. Bed low/locked, call light within reach, will continue to monitor

## 2019-09-08 NOTE — PROGRESS NOTES
"Pharmacy Kinetics 44 y.o. female on vancomycin day # 1 2019    Currently on Vancomycin 2750 mg IV Loading dose at 0630 hours today  Provider specified end date: Undetermined at this time.    Indication for Treatment: Pneumonia    Pertinent history per medical record: Admitted on 2019 for Pneumonia after failed oral azithromycin course and steroids, X-ray showing right middle lobe pneumonia.    Other antibiotics: Unasyn 3 Gm IV every 6 hours.    Allergies: Codeine     List concerns for renal function: BMI of 39.19 kg/m2  Pertinent cultures to date:   None at this time.    MRSA nares swab if pneumonia is a concern (ordered/positive/negative/n-a): Ordered.    Recent Labs     19  0337   WBC 17.4*   NEUTSPOLYS 68.70     Recent Labs     19  0337   BUN 10   CREATININE 1.03   ALBUMIN 4.3     No results for input(s): VANCOTROUGH, VANCOPEAK, VANCORANDOM in the last 72 hours.    Intake/Output Summary (Last 24 hours) at 2019 1233  Last data filed at 2019 0900  Gross per 24 hour   Intake 100 ml   Output --   Net 100 ml      /62   Pulse (!) 105   Temp 36.7 °C (98 °F)   Resp 18   Ht 1.702 m (5' 7\")   Wt 113.5 kg (250 lb 3.6 oz)   SpO2 97%  Temp (24hrs), Av.3 °C (97.4 °F), Min:36 °C (96.8 °F), Max:36.7 °C (98 °F)      A/P   1. Vancomycin dose change: 1750 IV every 12 hours  2. Next vancomycin level: tomorrow at 0630 hours.  3. Goal trough: 16-20 mcg/ml  4. Comments: Will monitor and adjust regimen per protocol.    Christos Russell formerly [facility]    "

## 2019-09-08 NOTE — H&P
Hospital Medicine History & Physical Note    Date of Service  9/8/2019    Primary Care Physician  ANGELLA Hubbard    Consultants  None    Code Status  Full    Chief Complaint  Fast heart rate    History of Presenting Illness  44 y.o. female who presented 9/8/2019 with palpitations.  Patient states around 9 PM she began noticing that her heart was racing, felt that it was a strong heartbeat as well.  She states this persisted and around 230 she decided she needed to come to emergency department.  Upon arrival she was noted to be in SVT, was given adenosine with conversion to sinus rhythm.  Patient currently is in sinus tachycardia.  Patient did have a CTA of the chest, was noted to have multilobular pneumonia.  Patient has had been treated for pneumonia recently.  Initially it was in the beginning of August, she was given amoxicillin, did not have any improvement, went to see another provider was given a Medrol Dosepak and a Z-David, 2 weeks later she was given another Z-David and more steroids.  She did have multiple x-ray showing a right middle lobe pneumonia.  Patient does complain of a cough however states it is improved, now nonproductive.  She does complain of chills.  I did discuss the case including labs and imaging with the ER physician.    Review of Systems  Review of Systems   Constitutional: Positive for chills and malaise/fatigue. Negative for fever.   HENT: Negative for congestion.    Respiratory: Positive for cough and shortness of breath. Negative for sputum production and stridor.    Cardiovascular: Positive for palpitations. Negative for chest pain and leg swelling.   Gastrointestinal: Negative for abdominal pain, constipation, diarrhea, nausea and vomiting.   Genitourinary: Negative for dysuria and urgency.   Musculoskeletal: Negative for falls and myalgias.   Neurological: Negative for dizziness, tingling, loss of consciousness, weakness and headaches.   Psychiatric/Behavioral: Negative for  depression and suicidal ideas.   All other systems reviewed and are negative.      Past Medical History   has a past medical history of Bowel habit changes, Heart burn, High cholesterol, and Hypertension. She also has no past medical history of Allergy.    Surgical History   has a past surgical history that includes primary c section (11/30/2011); tonsillectomy (1991); septoplasty (Bilateral, 6/17/2016); turbinoplasty (Bilateral, 6/17/2016); and antrostomy (Bilateral, 6/17/2016).     Family History  Reviewed, nonpertinent    Social History   reports that she has never smoked. She has never used smokeless tobacco. She reports that she drinks alcohol. She reports that she does not use drugs.    Allergies  Allergies   Allergen Reactions   • Codeine Vomiting and Nausea       Medications  Prior to Admission Medications   Prescriptions Last Dose Informant Patient Reported? Taking?   Biotin 5000 MCG Cap   Yes No   Sig: Take  by mouth every day.   Cetirizine HCl (ZYRTEC PO)   Yes No   Sig: Take  by mouth every day.   Cholecalciferol (VITAMIN D3) 5000 UNITS Cap   Yes No   Sig: Take 1 Cap by mouth every day.   FEVERFEW PO   Yes No   Sig: Take  by mouth.   Levonorgestrel (MIRENA, 52 MG, IU)   Yes No   Sig: by Intrauterine route.   Multiple Vitamin (MULTI-VITAMIN PO)   Yes No   Sig: Take  by mouth.   azithromycin (ZITHROMAX) 250 MG Tab   No No   Sig: Take 2 tabs today and then take 1 tab daily for 4 days.   benzonatate (TESSALON) 100 MG Cap   No No   Sig: Take 1 Cap by mouth 3 times a day as needed for Cough.   Patient not taking: Reported on 8/10/2019   ibuprofen (MOTRIN) 200 MG Tab   Yes No   Sig: Take 200 mg by mouth every 6 hours as needed.   losartan (COZAAR) 50 MG Tab   Yes No   Sig: Take 50 mg by mouth every day.   methylPREDNISolone (MEDROL DOSEPAK) 4 MG Tablet Therapy Pack   No No   Sig: Follow package instructions   nortriptyline (PAMELOR) 10 MG Cap   Yes No   Sig: Take 10 mg by mouth every evening.   nortriptyline  (PAMELOR) 25 MG Cap   No No   Sig: TAKE ONE CAPSULE BY MOUTH FOUR TIMES A DAY   omeprazole (PRILOSEC) 20 MG delayed-release capsule   Yes No   Sig: Take 20 mg by mouth every evening.   polymixin-trimethoprim (POLYTRIM) 91732-2.1 UNIT/ML-% Solution   No No   Sig: Place 1 Drop in both eyes every 4 hours.   Patient not taking: Reported on 8/6/2019   propranolol CR (INDERAL LA) 80 MG CAPSULE SR 24 HR   No No   Sig: Take 1 Cap by mouth every day.   zolmitriptan (ZOMIG) 5 MG tablet   No No   Sig: Take 1 Tab by mouth as needed for Migraine.   Patient not taking: Reported on 8/10/2019      Facility-Administered Medications: None       Physical Exam  Temp:  [36 °C (96.8 °F)] 36 °C (96.8 °F)  Pulse:  [100-214] 105  Resp:  [17-34] 17  BP: (105-119)/(46-80) 119/61  SpO2:  [98 %-99 %] 99 %    Physical Exam   Constitutional: She is oriented to person, place, and time. She appears well-developed. She is cooperative. No distress.   HENT:   Head: Normocephalic and atraumatic. Not macrocephalic and not microcephalic. Head is without raccoon's eyes and without Story's sign.   Right Ear: External ear normal.   Left Ear: External ear normal.   Mouth/Throat: Mucous membranes are dry. No oropharyngeal exudate.   Eyes: Conjunctivae are normal. Right eye exhibits no discharge. Left eye exhibits no discharge. No scleral icterus.   Neck: Neck supple. No tracheal deviation present.   Cardiovascular: Regular rhythm and intact distal pulses. Tachycardia present. Exam reveals no gallop, no distant heart sounds and no friction rub.   No murmur heard.  Pulmonary/Chest: Effort normal. No accessory muscle usage or stridor. No tachypnea and no bradypnea. No respiratory distress. She has no decreased breath sounds. She has no wheezes. She has no rhonchi. She has rales. She exhibits no tenderness.   Abdominal: Soft. Bowel sounds are normal. She exhibits no distension. There is no hepatosplenomegaly, splenomegaly or hepatomegaly. There is no tenderness.  There is no rebound and no guarding.   Musculoskeletal: Normal range of motion. She exhibits no edema or tenderness.   Lymphadenopathy:     She has no cervical adenopathy.   Neurological: She is alert and oriented to person, place, and time. No cranial nerve deficit. She displays no seizure activity.   Skin: Skin is warm, dry and intact. No rash noted. She is not diaphoretic. No erythema. No pallor.   Psychiatric: She has a normal mood and affect. Her speech is normal and behavior is normal. Judgment and thought content normal. Cognition and memory are normal.   Nursing note and vitals reviewed.      Laboratory:  Recent Labs     09/08/19 0337   WBC 17.4*   RBC 4.98   HEMOGLOBIN 15.7   HEMATOCRIT 47.0   MCV 94.4   MCH 31.5   MCHC 33.4*   RDW 50.4*   PLATELETCT 255   MPV 10.4     Recent Labs     09/08/19 0337   SODIUM 138   POTASSIUM 4.8   CHLORIDE 100   CO2 25   GLUCOSE 146*   BUN 10   CREATININE 1.03   CALCIUM 9.9     Recent Labs     09/08/19 0337   ALTSGPT 55*   ASTSGOT 35   ALKPHOSPHAT 74   TBILIRUBIN 0.5   GLUCOSE 146*         No results for input(s): NTPROBNP in the last 72 hours.      Recent Labs     09/08/19 0337   TROPONINT 29*       Urinalysis:    No results found     Imaging:  CT-CTA CHEST PULMONARY ARTERY W/ RECONS   Final Result         1. No definite CT evidence of pulmonary embolism. Limited evaluation of the subsegmental branches due to poor contrast enhancement.      2.  Airspace opacities in the right lower lobe and right middle lobe, in keeping with pneumonia      DX-CHEST-PORTABLE (1 VIEW)   Final Result         Worsening patchy opacity in the right middle lobe could relate to pneumonia.            Assessment/Plan:  I anticipate this patient will require at least two midnights for appropriate medical management, necessitating inpatient admission.    * SVT (supraventricular tachycardia) ([facility])  Assessment & Plan  -Now in sinus tachycardia after adenosine  -Monitor on telemetry  -Likely due to  sepsis    Sepsis (HCC)  Assessment & Plan  -This is severe sepsis with the following associated acute organ dysfunction(s): acute kidney failure.   -Due to pneumonia  -start IV Rocephin and doxycycline  -Await culture results  -Sepsis order set has been initiated, patient will receive significant IV fluids  -Trend lactic acid  -Patient is currently hemodynamically stable    Pneumonia  Assessment & Plan  -I did personally review the CT of the chest, noted right-sided pneumonia  -Patient has had pneumonia for almost a month  -has been on multiple antibiotic courses  -Start IV Rocephin and doxycycline  -Await culture results  -Causing sepsis  -Patient has been on antitussives, hold these except for Mucinex, I am hoping that the expectorant component does assist her  -Also start respiratory care and incentive spirometry    Acute kidney injury (HCC)  Assessment & Plan  -Mild, due to sepsis and dehydration  -Start IV fluids  -Repeat BMP in the morning    Chronic migraine- (present on admission)  Assessment & Plan  -No headache currently  -Continue home meds      VTE prophylaxis: Lovenox

## 2019-09-08 NOTE — CARE PLAN
Problem: Safety  Goal: Will remain free from injury  Outcome: PROGRESSING AS EXPECTED  Goal: Will remain free from falls  Outcome: PROGRESSING AS EXPECTED   Pt aware of POC. Pt understands the need for safety measures and effectively demonstrates by using call light appropriately and wearing treaded socks      Problem: Pain Management  Goal: Pain level will decrease to patient's comfort goal  Outcome: PROGRESSING AS EXPECTED  Pt aware of POC. Pt understands the need for effective pain management and effectively demonstrates by verbalizing pain and relief measures. Pt medicated as per MAR

## 2019-09-08 NOTE — ED PROVIDER NOTES
ED Provider Note    CHIEF COMPLAINT  Chief Complaint   Patient presents with   • Shortness of Breath     since 2100 yesterday; hx of pneumonia   • Chest Pain     since 2100 yesterday w/ palpitations       HPI  Reta Lam is a 44 y.o. female with a recent history of pneumonia, hypertension, and hyperlipidemia who presents with a chief complaint of chest pain.  The patient reports she has been improving since being treated for her pneumonia with multiple antibiotics including Augmentin, 2 rounds of azithromycin, as well as a dose of ceftriaxone but last night at approximately 9 PM, she developed substernal chest tightness that radiated to her throat.  It was associated with shortness of breath.  She also felt as though her heart was pounding and was going to jump out of her neck.  She had some associated nausea but did not vomit.  She had never experienced these symptoms in the past.  She did not take any medication for her symptoms but awoke at 330, checked her pulse, and noted that it was 214.  She called her mother who then brought her to the emergency department for evaluation.  She denies any history of fever, hemoptysis, lower extremity edema, history of DVT/PE, recent surgeries or travel.  She does have a Mirena IUD in place.    REVIEW OF SYSTEMS  See HPI for further details.  Chest pain.  Shortness of breath.  Nausea.  Palpitations.  All other systems are negative.     PAST MEDICAL HISTORY   has a past medical history of Bowel habit changes, Heart burn, High cholesterol, and Hypertension.    SOCIAL HISTORY  Social History     Tobacco Use   • Smoking status: Never Smoker   • Smokeless tobacco: Never Used   Substance and Sexual Activity   • Alcohol use: Yes     Comment: 4 per week   • Drug use: No   • Sexual activity: Not on file       SURGICAL HISTORY   has a past surgical history that includes primary c section (11/30/2011); tonsillectomy (1991); septoplasty (Bilateral, 6/17/2016); turbinoplasty  "(Bilateral, 6/17/2016); and antrostomy (Bilateral, 6/17/2016).    CURRENT MEDICATIONS  Home Medications    **Home medications have not yet been reviewed for this encounter**         ALLERGIES  Allergies   Allergen Reactions   • Codeine Vomiting and Nausea       PHYSICAL EXAM  VITAL SIGNS: /80   Pulse (!) 103   Resp (!) 33   Ht 1.727 m (5' 8\")   Wt 113 kg (249 lb 1.9 oz)   SpO2 98%   BMI 37.88 kg/m²   Pulse ox interpretation: I interpret this pulse ox as normal.  Constitutional: Alert, uncomfortable appearing, diaphoretic, pale, cool extremities.  HENT: No signs of trauma, Bilateral external ears normal, Nose normal.  Moist mucous membranes.  Eyes: Pupils are equal and reactive, Conjunctiva normal, Non-icteric.   Neck: Normal range of motion, No tenderness, Supple, No stridor.   Lymphatic: No lymphadenopathy noted.   Cardiovascular: Tachycardic with regular rhythm, no murmurs.  Thready peripheral pulses.  Thorax & Lungs: Normal breath sounds, No respiratory distress, No wheezing, No chest tenderness.   Abdomen: Bowel sounds normal, Soft, No tenderness, No masses, No pulsatile masses. No peritoneal signs.  Skin: Warm, Dry, No erythema, No rash.   Back: Normal alignment.  Extremities: No edema, No tenderness, No cyanosis.  Musculoskeletal: Good range of motion in all major joints. No tenderness to palpation or major deformities noted.   Neurologic: Alert, Normal motor function, Normal sensory function, No focal deficits noted.   Psychiatric: Affect normal, Judgment normal, Mood normal.       DIAGNOSTIC STUDIES / PROCEDURES    EKG  Results for orders placed or performed during the hospital encounter of 09/08/19   EKG   Result Value Ref Range    Report       [facility]    Test Date:  2019-09-08  Pt Name:    DIAZ GARCIA              Department: [facility]  MRN:        [id number]                      Room:       -ROOM 5  Gender:     Female                       Technician: " JE  :        1975                   Requested By:REX SORIANO  Order #:    [id number]                    Reading MD: Rex Soriano MD    Measurements  Intervals                                Axis  Rate:       202                          P:          0  MN:                                      QRS:        45  QRSD:       85                           T:          2  QT:         264  QTc:        484    Interpretive Statements  Supraventricular tachycardia  RSR' in V1 or V2, probably normal variant  Compared to ECG 06/15/2016 10:27:54  RSR' in V1 or V2 now present  Sinus rhythm no longer present    Electronically Signed On 2019 3:56:56 PDT by Rex Soriano MD     EKG   Result Value Ref Range    Report       [facility]    Test Date:  2019  Pt Name:    DIAZ GARCIA              Department[facility]  MRN:        [id number]                      [facility]  Gender:     Female                       Technician: WILLA  :        1975                   Requested By:REX SORIANO  Order #:    [id number]                    Reading MD: Rex Soriano MD    Measurements  Intervals                                Axis  Rate:       100                          P:          54  MN:         123                          QRS:        39  QRSD:       92                           T:          25  QT:         362  QTc:        467    Interpretive Statements  Sinus tachycardia  Compared to ECG 2019 03:25:58  Supraventricular tachycardia no longer present    Electronically Signed On 2019 3:56:47 PDT by Rex Soriano MD       LABS  CBC  CMP  Troponin  TSH  Free T4  Urine drug screen  Urinalysis  hCG    RADIOLOGY  Chest x-ray  CTA chest    COURSE & MEDICAL DECISION MAKING  Pertinent Labs & Imaging studies reviewed. (See chart for details)  This is a 44-year-old female who presents with chest pain and shortness of breath as well as a triage EKG that reveals SVT.  The patient  was roomed immediately, and IV was established, pads were placed.  She had a blood pressure in the low 100s, was alert, and mentating well.  Her peripheral pulses were thready and her extremities were cool.  Patient was started on IV fluids and placed on oxygen via nasal cannula.  I did try a vagal maneuver and when it did not work, the patient was administered 6 mg of adenosine.  She had almost immediate improvement in her heart rate from the 200s to the high 90s low 100s.  She felt significantly better although she did still have some chest pressure and was complaining of some ear pain.  IV fluids were placed on a pressure bag.    Labs are ordered and a chest x-ray was performed.  A CTA was also ordered to evaluate for potential PE.    CBC reveals leukocytosis to 17.4.  She does have an anion gap to 13 with a blood glucose of 146.  Her LFTs are only remarkable for an ALT of 55.  Her troponin is elevated to 29 and given her continued chest pressure she was given a dose of aspirin 324 mg.  Thyroid function is normal.  hCG is negative.    Chest x-ray reveals a worsening patchy opacity in the right middle lobe.  CTA without evidence for large PE.  Does confirm multifocal pneumonia.  Blood cultures were ordered and because the patient has failed outpatient treatment she was started on vancomycin and Zosyn.  She will benefit from inpatient admission for continued IV antibiotics until her symptoms improve.    I discussed the patient with the on-call hospitalist, Dr. Linares, who will admit the patient to his service.  The patient was admitted in guarded condition.    HYDRATION  HYDRATION: Based on the patient's presentation of Tachycardia the patient was given IV fluids. IV Hydration was used because oral hydration was not adequate alone. Upon recheck following hydration, the patient was Improved.    FINAL IMPRESSION  1.  SVT  2.  Multifocal pneumonia    Electronically signed by: Ajith Goodson, 9/8/2019 3:48 AM

## 2019-09-09 VITALS
HEART RATE: 88 BPM | RESPIRATION RATE: 20 BRPM | TEMPERATURE: 98.3 F | DIASTOLIC BLOOD PRESSURE: 78 MMHG | HEIGHT: 67 IN | OXYGEN SATURATION: 94 % | BODY MASS INDEX: 40 KG/M2 | SYSTOLIC BLOOD PRESSURE: 128 MMHG | WEIGHT: 254.85 LBS

## 2019-09-09 LAB
ANION GAP SERPL CALC-SCNC: 7 MMOL/L (ref 0–11.9)
BASOPHILS # BLD AUTO: 0.7 % (ref 0–1.8)
BASOPHILS # BLD: 0.05 K/UL (ref 0–0.12)
BUN SERPL-MCNC: 8 MG/DL (ref 8–22)
CALCIUM SERPL-MCNC: 8.3 MG/DL (ref 8.4–10.2)
CHLORIDE SERPL-SCNC: 108 MMOL/L (ref 96–112)
CO2 SERPL-SCNC: 24 MMOL/L (ref 20–33)
CREAT SERPL-MCNC: 0.77 MG/DL (ref 0.5–1.4)
EOSINOPHIL # BLD AUTO: 0.16 K/UL (ref 0–0.51)
EOSINOPHIL NFR BLD: 2.2 % (ref 0–6.9)
ERYTHROCYTE [DISTWIDTH] IN BLOOD BY AUTOMATED COUNT: 52.1 FL (ref 35.9–50)
EST. AVERAGE GLUCOSE BLD GHB EST-MCNC: 111 MG/DL
GLUCOSE SERPL-MCNC: 99 MG/DL (ref 65–99)
HBA1C MFR BLD: 5.5 % (ref 0–5.6)
HCT VFR BLD AUTO: 36.3 % (ref 37–47)
HGB BLD-MCNC: 11.7 G/DL (ref 12–16)
IMM GRANULOCYTES # BLD AUTO: 0.03 K/UL (ref 0–0.11)
IMM GRANULOCYTES NFR BLD AUTO: 0.4 % (ref 0–0.9)
LYMPHOCYTES # BLD AUTO: 2.26 K/UL (ref 1–4.8)
LYMPHOCYTES NFR BLD: 31.3 % (ref 22–41)
MCH RBC QN AUTO: 31.5 PG (ref 27–33)
MCHC RBC AUTO-ENTMCNC: 32.5 G/DL (ref 33.6–35)
MCV RBC AUTO: 97 FL (ref 81.4–97.8)
MONOCYTES # BLD AUTO: 0.72 K/UL (ref 0–0.85)
MONOCYTES NFR BLD AUTO: 10 % (ref 0–13.4)
NEUTROPHILS # BLD AUTO: 3.99 K/UL (ref 2–7.15)
NEUTROPHILS NFR BLD: 55.4 % (ref 44–72)
NRBC # BLD AUTO: 0 K/UL
NRBC BLD-RTO: 0 /100 WBC
PLATELET # BLD AUTO: 132 K/UL (ref 164–446)
PMV BLD AUTO: 10.5 FL (ref 9–12.9)
POTASSIUM SERPL-SCNC: 4.1 MMOL/L (ref 3.6–5.5)
RBC # BLD AUTO: 3.71 M/UL (ref 4.2–5.4)
SODIUM SERPL-SCNC: 139 MMOL/L (ref 135–145)
VANCOMYCIN TROUGH SERPL-MCNC: 12.6 UG/ML (ref 10–20)
WBC # BLD AUTO: 7.2 K/UL (ref 4.8–10.8)

## 2019-09-09 PROCEDURE — A9270 NON-COVERED ITEM OR SERVICE: HCPCS | Performed by: HOSPITALIST

## 2019-09-09 PROCEDURE — 700102 HCHG RX REV CODE 250 W/ 637 OVERRIDE(OP)

## 2019-09-09 PROCEDURE — 83036 HEMOGLOBIN GLYCOSYLATED A1C: CPT

## 2019-09-09 PROCEDURE — 90732 PPSV23 VACC 2 YRS+ SUBQ/IM: CPT | Performed by: HOSPITALIST

## 2019-09-09 PROCEDURE — 700102 HCHG RX REV CODE 250 W/ 637 OVERRIDE(OP): Performed by: HOSPITALIST

## 2019-09-09 PROCEDURE — 700111 HCHG RX REV CODE 636 W/ 250 OVERRIDE (IP): Performed by: INTERNAL MEDICINE

## 2019-09-09 PROCEDURE — 700111 HCHG RX REV CODE 636 W/ 250 OVERRIDE (IP): Performed by: HOSPITALIST

## 2019-09-09 PROCEDURE — 90686 IIV4 VACC NO PRSV 0.5 ML IM: CPT | Performed by: HOSPITALIST

## 2019-09-09 PROCEDURE — 80048 BASIC METABOLIC PNL TOTAL CA: CPT

## 2019-09-09 PROCEDURE — 99239 HOSP IP/OBS DSCHRG MGMT >30: CPT | Performed by: HOSPITALIST

## 2019-09-09 PROCEDURE — 80202 ASSAY OF VANCOMYCIN: CPT

## 2019-09-09 PROCEDURE — 85025 COMPLETE CBC W/AUTO DIFF WBC: CPT

## 2019-09-09 PROCEDURE — A9270 NON-COVERED ITEM OR SERVICE: HCPCS | Performed by: INTERNAL MEDICINE

## 2019-09-09 PROCEDURE — 700105 HCHG RX REV CODE 258: Performed by: HOSPITALIST

## 2019-09-09 PROCEDURE — 700102 HCHG RX REV CODE 250 W/ 637 OVERRIDE(OP): Performed by: INTERNAL MEDICINE

## 2019-09-09 PROCEDURE — A9270 NON-COVERED ITEM OR SERVICE: HCPCS

## 2019-09-09 PROCEDURE — 90471 IMMUNIZATION ADMIN: CPT

## 2019-09-09 RX ORDER — OMEPRAZOLE 20 MG/1
CAPSULE, DELAYED RELEASE ORAL
Status: COMPLETED
Start: 2019-09-09 | End: 2019-09-09

## 2019-09-09 RX ORDER — AMOXICILLIN AND CLAVULANATE POTASSIUM 875; 125 MG/1; MG/1
1 TABLET, FILM COATED ORAL 2 TIMES DAILY
Qty: 12 TAB | Refills: 0 | Status: SHIPPED | OUTPATIENT
Start: 2019-09-09 | End: 2019-09-15

## 2019-09-09 RX ORDER — NORTRIPTYLINE HYDROCHLORIDE 25 MG/1
25 CAPSULE ORAL
Qty: 30 CAP | Refills: 6
Start: 2019-09-09 | End: 2020-01-21 | Stop reason: SDUPTHER

## 2019-09-09 RX ADMIN — PNEUMOCOCCAL VACCINE POLYVALENT 25 MCG
25; 25; 25; 25; 25; 25; 25; 25; 25; 25; 25; 25; 25; 25; 25; 25; 25; 25; 25; 25; 25; 25; 25 INJECTION, SOLUTION INTRAMUSCULAR; SUBCUTANEOUS at 15:15

## 2019-09-09 RX ADMIN — SENNOSIDES AND DOCUSATE SODIUM 2 TABLET: 8.6; 5 TABLET ORAL at 08:33

## 2019-09-09 RX ADMIN — OMEPRAZOLE 20 MG: 20 CAPSULE, DELAYED RELEASE ORAL at 07:31

## 2019-09-09 RX ADMIN — DOXYCYCLINE 100 MG: 100 TABLET, FILM COATED ORAL at 06:04

## 2019-09-09 RX ADMIN — PROPRANOLOL HYDROCHLORIDE 60 MG: 60 CAPSULE, EXTENDED RELEASE ORAL at 06:06

## 2019-09-09 RX ADMIN — INFLUENZA A VIRUS A/BRISBANE/02/2018 IVR-190 (H1N1) ANTIGEN (FORMALDEHYDE INACTIVATED), INFLUENZA A VIRUS A/KANSAS/14/2017 X-327 (H3N2) ANTIGEN (FORMALDEHYDE INACTIVATED), INFLUENZA B VIRUS B/PHUKET/3073/2013 ANTIGEN (FORMALDEHYDE INACTIVATED), AND INFLUENZA B VIRUS B/MARYLAND/15/2016 BX-69A ANTIGEN (FORMALDEHYDE INACTIVATED) 0.5 ML: 15; 15; 15; 15 INJECTION, SUSPENSION INTRAMUSCULAR at 15:30

## 2019-09-09 RX ADMIN — ENOXAPARIN SODIUM 40 MG: 100 INJECTION SUBCUTANEOUS at 06:09

## 2019-09-09 RX ADMIN — IBUPROFEN 400 MG: 200 TABLET, FILM COATED ORAL at 07:35

## 2019-09-09 RX ADMIN — GUAIFENESIN 600 MG: 600 TABLET, EXTENDED RELEASE ORAL at 06:04

## 2019-09-09 RX ADMIN — AMPICILLIN SODIUM AND SULBACTAM SODIUM 3 G: 2; 1 INJECTION, POWDER, FOR SOLUTION INTRAMUSCULAR; INTRAVENOUS at 06:04

## 2019-09-09 NOTE — DISCHARGE SUMMARY
Discharge Summary    CHIEF COMPLAINT ON ADMISSION  Chief Complaint   Patient presents with   • Shortness of Breath     since 2100 yesterday; hx of pneumonia   • Chest Pain     since 2100 yesterday w/ palpitations       Reason for Admission  SOB,      Admission Date  9/8/2019    CODE STATUS  Full Code    HPI & HOSPITAL COURSE  This is a 44 y.o. female here with palpitations.    She was noted to be in SVT and to have a right lobar pneumonia with related sepsis. She was given adenosine in the ED and treated for her sepsis. She had failed multiple rounds of outpatient azithromycin. She responded well to unasyn and will complete a course of augmentin at home. Her nortriptyline dose has been decreased to 25 as this can cause svt. She will taper off of it soon. She will follow up with her PCP and improved through her hospital course. .      Therefore, she is discharged in good and stable condition to home with close outpatient follow-up.    The patient met 2-midnight criteria for an inpatient stay at the time of discharge.    Discharge Date  9/9/2019    FOLLOW UP ITEMS POST DISCHARGE  none    DISCHARGE DIAGNOSES  Principal Problem:    SVT (supraventricular tachycardia) (HCC) POA: Yes  Active Problems:    Sepsis (HCC) POA: Yes    Pneumonia POA: Yes    Chronic migraine POA: Yes    Acute kidney injury (HCC) POA: Yes    Hypertension POA: Unknown    GERD (gastroesophageal reflux disease) POA: Unknown    Hyperglycemia POA: Unknown  Resolved Problems:    * No resolved hospital problems. *      FOLLOW UP  Future Appointments   Date Time Provider Department Center   10/15/2019 10:00 AM PATSY Cervantes [facility] None     [facility]  [address] [facility] [address]  [phone]            MEDICATIONS ON DISCHARGE     Medication List      START taking these medications      Instructions   amoxicillin-clavulanate 875-125 MG Tabs  Commonly known as:  AUGMENTIN   Take 1 Tab by mouth 2 times a day for 6 days.  Dose:   1 Tab        CHANGE how you take these medications      Instructions   nortriptyline 25 MG Caps  What changed:    · how much to take  · when to take this  Commonly known as:  PAMELOR   Take 1 Cap by mouth every bedtime.  Dose:  25 mg        CONTINUE taking these medications      Instructions   albuterol 108 (90 Base) MCG/ACT Aers inhalation aerosol   Inhale 2 Puffs by mouth every 6 hours as needed for Shortness of Breath.  Dose:  2 Puff     BOTOX INJ  Notes to patient:  Resume home schedule   1 Each by Injection route Every 3 Months.  Dose:  1 Each     cetirizine 10 MG Tabs  Commonly known as:  ZYRTEC   Take 10 mg by mouth every bedtime.  Dose:  10 mg     ibuprofen 200 MG Tabs  Commonly known as:  MOTRIN   Take 400 mg by mouth every 6 hours as needed for Mild Pain.  Dose:  400 mg     losartan 50 MG Tabs  Commonly known as:  COZAAR   Take 50 mg by mouth every bedtime.  Dose:  50 mg     MIRENA (52 MG) IU   1 Each by Intrauterine route See Admin Instructions. Every 5 years  Dose:  1 Each     MULTI-VITAMIN PO   Take 1 Tab by mouth every bedtime.  Dose:  1 Tab     omeprazole 20 MG delayed-release capsule  Commonly known as:  PRILOSEC   Take 20 mg by mouth every evening.  Dose:  20 mg     propranolol LA 60 MG Cp24  Commonly known as:  INDERAL LA   Take 60 mg by mouth every day.  Dose:  60 mg     vitamin D3 5000 units Caps   Take 5,000 Units by mouth every bedtime.  Dose:  5,000 Units            Allergies  Allergies   Allergen Reactions   • Codeine Vomiting and Nausea       DIET  Orders Placed This Encounter   Procedures   • Diet Order Regular     Standing Status:   Standing     Number of Occurrences:   1     Order Specific Question:   Diet:     Answer:   Regular [1]   • Discontinue Diet Tray     Standing Status:   Standing     Number of Occurrences:   1       ACTIVITY  As tolerated.  Weight bearing as tolerated    CONSULTATIONS  none    PROCEDURES  none    LABORATORY  Lab Results   Component Value Date    SODIUM 139  09/09/2019    POTASSIUM 4.1 09/09/2019    CHLORIDE 108 09/09/2019    CO2 24 09/09/2019    GLUCOSE 99 09/09/2019    BUN 8 09/09/2019    CREATININE 0.77 09/09/2019        Lab Results   Component Value Date    WBC 7.2 09/09/2019    HEMOGLOBIN 11.7 (L) 09/09/2019    HEMATOCRIT 36.3 (L) 09/09/2019    PLATELETCT 132 (L) 09/09/2019        Total time of the discharge process exceeds 34 minutes.

## 2019-09-09 NOTE — DISCHARGE INSTRUCTIONS
Discharge Instructions per Marcus Hood M.D.        DIET: regular    ACTIVITY: as tolerated    DIAGNOSIS: pneumonia    Return to ER if symptoms worsen. Fevers.                 Discharge Instructions    Discharged to home by car with relative. Discharged via wheelchair, hospital escort: Yes.  Special equipment needed: Not Applicable    Be sure to schedule a follow-up appointment with your primary care doctor or any specialists as instructed.     Discharge Plan:   Influenza Vaccine Indication: Indicated: Not available from distributor/    I understand that a diet low in cholesterol, fat, and sodium is recommended for good health. Unless I have been given specific instructions below for another diet, I accept this instruction as my diet prescription.   Other diet: regular    Special Instructions: Sepsis, Adult  Sepsis is a serious infection of your blood or tissues that affects your whole body. The infection that causes sepsis may be bacterial, viral, fungal, or parasitic. Sepsis may be life threatening. Sepsis can cause your blood pressure to drop. This may result in shock. Shock causes your central nervous system and your organs to stop working correctly.  What increases the risk?  Sepsis can happen in anyone, but it is more likely to happen in people who have weakened immune systems.  What are the signs or symptoms?  Symptoms of sepsis can include:  · Fever or low body temperature (hypothermia).  · Rapid breathing (hyperventilation).  · Chills.  · Rapid heartbeat (tachycardia).  · Confusion or light-headedness.  · Trouble breathing.  · Urinating much less than usual.  · Cool, clammy skin or red, flushed skin.  · Other problems with the heart, kidneys, or brain.  How is this diagnosed?  Your health care provider will likely do tests to look for an infection, to see if the infection has spread to your blood, and to see how serious your condition is. Tests can include:  · Blood tests, including  cultures of your blood.  · Cultures of other fluids from your body, such as:  ¨ Urine.  ¨ Pus from wounds.  ¨ Mucus coughed up from your lungs.  · Urine tests other than cultures.  · X-ray exams or other imaging tests.  How is this treated?  Treatment will begin with elimination of the source of infection. If your sepsis is likely caused by a bacterial or fungal infection, you will be given antibiotic or antifungal medicines.  You may also receive:  · Oxygen.  · Fluids through an IV tube.  · Medicines to increase your blood pressure.  · A machine to clean your blood (dialysis) if your kidneys fail.  · A machine to help you breathe if your lungs fail.  Get help right away if:  You get an infection or develop any of the signs and symptoms of sepsis after surgery or a hospitalization.  This information is not intended to replace advice given to you by your health care provider. Make sure you discuss any questions you have with your health care provider.  Document Released: 09/15/2004 Document Revised: 05/25/2017 Document Reviewed: 08/25/2014  [employer] Interactive Patient Education © 2017 [employer]      · Is patient discharged on Warfarin / Coumadin?   No     Depression / Suicide Risk    As you are discharged from this [facility] facility, it is important to learn how to keep safe from harming yourself.    Recognize the warning signs:  · Abrupt changes in personality, positive or negative- including increase in energy   · Giving away possessions  · Change in eating patterns- significant weight changes-  positive or negative  · Change in sleeping patterns- unable to sleep or sleeping all the time   · Unwillingness or inability to communicate  · Depression  · Unusual sadness, discouragement and loneliness  · Talk of wanting to die  · Neglect of personal appearance   · Rebelliousness- reckless behavior  · Withdrawal from people/activities they love  · Confusion- inability to concentrate     If you or a loved one  observes any of these behaviors or has concerns about self-harm, here's what you can do:  · Talk about it- your feelings and reasons for harming yourself  · Remove any means that you might use to hurt yourself (examples: pills, rope, extension cords, firearm)  · Get professional help from the community (Mental Health, Substance Abuse, psychological counseling)  · Do not be alone:Call your Safe Contact- someone whom you trust who will be there for you.  · Call your local CRISIS HOTLINE 192-3117 or 970-229-5954  · Call your local Children's Mobile Crisis Response Team [place] (954) 259-5464 or www.Sharely.Us  · Call the toll free National Suicide Prevention Hotlines   · National Suicide Prevention Lifeline 076-679-XDSI (3762)  · [employer] Line Network 800-SUICIDE (249-1200)        Community-Acquired Pneumonia, Adult  Introduction  Pneumonia is an infection of the lungs. One type of pneumonia can happen while a person is in a hospital. A different type can happen when a person is not in a hospital (community-acquired pneumonia). It is easy for this kind to spread from person to person. It can spread to you if you breathe near an infected person who coughs or sneezes. Some symptoms include:  · A dry cough.  · A wet (productive) cough.  · Fever.  · Sweating.  · Chest pain.  Follow these instructions at home:  · Take over-the-counter and prescription medicines only as told by your doctor.  ¨ Only take cough medicine if you are losing sleep.  ¨ If you were prescribed an antibiotic medicine, take it as told by your doctor. Do not stop taking the antibiotic even if you start to feel better.  · Sleep with your head and neck raised (elevated). You can do this by putting a few pillows under your head, or you can sleep in a recliner.  · Do not use tobacco products. These include cigarettes, chewing tobacco, and e-cigarettes. If you need help quitting, ask your doctor.  · Drink enough water to keep your pee  (urine) clear or pale yellow.  A shot (vaccine) can help prevent pneumonia. Shots are often suggested for:  · People older than 65 years of age.  · People older than 19 years of age:  ¨ Who are having cancer treatment.  ¨ Who have long-term (chronic) lung disease.  ¨ Who have problems with their body's defense system (immune system).  You may also prevent pneumonia if you take these actions:  · Get the flu (influenza) shot every year.  · Go to the dentist as often as told.  · Wash your hands often. If soap and water are not available, use hand .  Contact a doctor if:  · You have a fever.  · You lose sleep because your cough medicine does not help.  Get help right away if:  · You are short of breath and it gets worse.  · You have more chest pain.  · Your sickness gets worse. This is very serious if:  ¨ You are an older adult.  ¨ Your body's defense system is weak.  · You cough up blood.  This information is not intended to replace advice given to you by your health care provider. Make sure you discuss any questions you have with your health care provider.  Document Released: 06/05/2009 Document Revised: 05/25/2017 Document Reviewed: 04/13/2016  © 2017 Elsevier          Supraventricular Tachycardia, Adult  Supraventricular tachycardia (SVT) is a kind of abnormal heartbeat. It makes your heart beat very fast and then beat at a normal speed.  A normal heart beats  times a minute. This condition can make your heart beat more than 150 times a minute. Times of having a fast heartbeat (episodes) can be scary, but they are usually not dangerous. They can lead to problems if:  · They happen often.  · They last a long time.  Symptoms of this condition include:  · A pounding heart.  · A feeling that your heart is skipping beats (palpitations).  · Weakness.  · Trouble getting enough air (shortness of breath).  · Pain or tightness in your chest.  · Feeling like you are going to pass out  "(light-headedness).  · Feeling worried or nervous (anxiety).  · Dizziness.  · Sweating.  · Feeling sick to your stomach (nausea).  · Passing out (fainting).  · Tiredness.  Sometimes, there are no symptoms.  Follow these instructions at home:  Stress  · Avoid things that make you feel stressed.  · Find out what helps you feel less stressed. Try:  ¨ Doing a relaxing activity, like yoga, meditation, or being out in nature.  ¨ Listening to relaxing music.  ¨ Doing relaxation techniques, like deep breathing.  ¨ Taking steps to be healthy. These include getting lots of sleep, exercising, and eating a balanced diet.  ¨ Talking with a mental health doctor.  Sleep  · Try to get at least 7 hours of sleep each night.  Tobacco and nicotine  · Do not use anything that has nicotine or tobacco, such as cigarettes and e-cigarettes. If you need help quitting, ask your doctor.  Alcohol  · If alcohol gives you a fast heartbeat, do not drink alcohol.  · If alcohol does not seem to give you a fast heartbeat, limit your alcohol. For nonpregnant women, this means no more than 1 drink a day. For men, this means no more than 2 drinks a day. \"One drink\" means one of these:  ¨ 12 oz of beer.  ¨ 5 oz of wine.  ¨ 1½ oz of hard liquor.  Caffeine  · If caffeine gives you a fast heartbeat, do not eat, drink, or use anything with caffeine in it.  · If caffeine does not seem to give you a fast heartbeat, limit how much caffeine you eat, drink, or use.  Stimulant drugs  · Do not use stimulant drugs. These are drugs like cocaine or methamphetamine. If you need help quitting, ask your doctor.  General instructions  · Stay at a healthy weight.  · Exercise regularly. Ask your doctor to suggest some good activities for you. Try one of these options:  ¨ 150 minutes a week of gentle exercise, like walking or yoga.  ¨ 75 minutes a week of exercise that is very active, like running or swimming.  ¨ A combination of gentle exercise and very active " exercise.  · Do home treatments to slow down your heartbeat as told by your doctor.  · Take over-the-counter and prescription medicines only as told by your doctor.  Contact a doctor if:  · You have a fast heartbeat more often.  · Times of having a fast heartbeat last longer than before.  · Your home treatments to slow down your heartbeat do not help.  · You have new symptoms.  Get help right away if:  · You have chest pain.  · Your symptoms get worse.  · You have trouble breathing.  · Your heart beats very fast for more than 20 minutes.  · You pass out (faint).  These symptoms may be an emergency. Do not wait to see if the symptoms will go away. Get medical help right away. Call your local emergency services (911 in the [place] Do not drive yourself to the hospital.   This information is not intended to replace advice given to you by your health care provider. Make sure you discuss any questions you have with your health care provider.  Document Released: 12/18/2006 Document Revised: 08/24/2017 Document Reviewed: 08/24/2017  [employer]vier Interactive Patient Education © 2017 [employer].

## 2019-09-09 NOTE — PROGRESS NOTES
Telemetry Shift Summary     Rhythm SR-ST  HR Range   Ectopy none  Measurements .016/0.08/0.36           Normal Values  Rhythm SR  HR Range    Measurements 0.12-0.20 / 0.06-0.10  / 0.30-0.52

## 2019-09-09 NOTE — CARE PLAN
Problem: Safety  Goal: Will remain free from falls  Outcome: PROGRESSING AS EXPECTED  Intervention: Implement fall precautions  Flowsheets  Taken 9/9/2019 0937  Bed Alarm: Alarm Not On  Taken 9/9/2019 0800  Environmental Precautions: Treaded Slipper Socks on Patient;Personal Belongings, Wastebasket, Call Bell etc. in Easy Reach;Transferred to Stronger Side;Report Given to Other Health Care Providers Regarding Fall Risk;Bed in Low Position;Communication Sign for Patients & Families;Mobility Assessed & Appropriate Sign Placed  Note:   Pt ambulatory. Gait steady. Bed in lowest position, wheels locked. Call bell within reach, calls appropriately for assistance. Fall prevention education provided, verbalized understanding. Hourly rounding. Fall/safety precautions in place. Pt remains free from fall/injury at this time.       Problem: Infection  Goal: Will remain free from infection  Outcome: PROGRESSING AS EXPECTED  Intervention: Assess signs and symptoms of infection  Note:   Breathing improved per pt report. IV abx as ordered, followed by pharmacy. Will continue to monitor.

## 2019-09-09 NOTE — PROGRESS NOTES
Telemetry Shift Summary    Rhythm SR  HR Range 80s-90s  Ectopy R-PVC  Measurements 0.16/0.08/0.36        Normal Values  Rhythm SR  HR Range    Measurements 0.12-0.20 / 0.06-0.10  / 0.30-0.52

## 2019-09-09 NOTE — PROGRESS NOTES
Gave bedside report to VENTURA Pathak. Plan of care discussed. Safety precautions in place. Personal belongings and call light are with in reach. Patient has no additional needs at this time.

## 2019-09-09 NOTE — PROGRESS NOTES
MD Hood cleared pt for discharge. Discussed discharge instructions with supportive mother at bedside, all questions answered. Tele and IV D/C. Belongings gathered and given to pt upon leaving the unit. Escorted off unit in stable condition via wheelchair.

## 2019-09-09 NOTE — PROGRESS NOTES
Telemetry Shift Summary    Rhythm SR/ST  HR Range   Ectopy -  Measurements 0.14/0.08/0.36        Normal Values  Rhythm SR  HR Range    Measurements 0.12-0.20 / 0.06-0.10  / 0.30-0.52

## 2019-09-09 NOTE — PROGRESS NOTES
"Pharmacy Kinetics 44 y.o. female on vancomycin day # 2 2019    Currently on Vancomycin 1730 mg iv q12hr    Provider specified end date:Undetermined at this time.     Indication for Treatment: Pneumonia     Pertinent history per medical record: Admitted on 2019 for Pneumonia after failed oral azithromycin course and steroids, X-ray showing right middle lobe pneumonia.     Other antibiotics: Unasyn 3 Gm IV every 6 hours.     Allergies: Codeine      List concerns for renal function: BMI of 39.19 kg/m2  Pertinent cultures to date:    BCX2 NGTD   PCT 0.13     MRSA nares swab if pneumonia is a concern (ordered/positive/negative/n-a):  Neg    Recent Labs     19  0337 19  0633   WBC 17.4* 7.2   NEUTSPOLYS 68.70 55.40     Recent Labs     19  0337 19  0633   BUN 10 8   CREATININE 1.03 0.77   ALBUMIN 4.3  --      Recent Labs     19  0633   VANCOTROUGH 12.6       Intake/Output Summary (Last 24 hours) at 2019 0803  Last data filed at 2019 0700  Gross per 24 hour   Intake 1840 ml   Output --   Net 1840 ml      /77   Pulse 88   Temp 36.5 °C (97.7 °F) (Oral)   Resp 18   Ht 1.702 m (5' 7\")   Wt 115.6 kg (254 lb 13.6 oz)   SpO2 97%  Temp (24hrs), Av.8 °C (98.3 °F), Min:36.5 °C (97.7 °F), Max:37.2 °C (98.9 °F)      A/P   1. Vancomycin dose change: 1250 mg q 8 h  2. Next vancomycin level: 9/10 06:30  3. Goal trough: 16-20 mcg/ ml  4. Comments: Will continue to monitor and adjust dose as needed per protocol.    Mayelin Light    "

## 2019-09-09 NOTE — PROGRESS NOTES
Assumed care of pt w/ bedside report from VENTURA Mishra. Pt resting comfortably in bed, no complaints offered. Fall precautions/safety maintained. Hourly rounding. Will continue to monitor.

## 2019-09-09 NOTE — CARE PLAN
Problem: Infection  Goal: Will remain free from infection  Outcome: PROGRESSING AS EXPECTED  Note:   Assess and monitor for signs and symptoms of infection. Perform hand hygiene before and after patient contact and entering/exiting the room.     Problem: Knowledge Deficit  Goal: Knowledge of disease process/condition, treatment plan, diagnostic tests, and medications will improve  Outcome: PROGRESSING AS EXPECTED  Note:   Encourage patient to ask questions and be involved in plan of care. Provide education on treatment plan, diagnostic testing, and medications; have patient verbalize understanding.

## 2019-09-13 ENCOUNTER — OFFICE VISIT (OUTPATIENT)
Dept: INTERNAL MEDICINE | Facility: IMAGING CENTER | Age: 44
End: 2019-09-13
Payer: COMMERCIAL

## 2019-09-13 VITALS
RESPIRATION RATE: 16 BRPM | SYSTOLIC BLOOD PRESSURE: 125 MMHG | DIASTOLIC BLOOD PRESSURE: 75 MMHG | OXYGEN SATURATION: 97 % | TEMPERATURE: 97.9 F | BODY MASS INDEX: 38.62 KG/M2 | HEART RATE: 77 BPM | WEIGHT: 254.85 LBS | HEIGHT: 68 IN

## 2019-09-13 DIAGNOSIS — Z09 HOSPITAL DISCHARGE FOLLOW-UP: ICD-10-CM

## 2019-09-13 DIAGNOSIS — E66.9 OBESITY (BMI 30-39.9): ICD-10-CM

## 2019-09-13 DIAGNOSIS — I47.10 SVT (SUPRAVENTRICULAR TACHYCARDIA): ICD-10-CM

## 2019-09-13 DIAGNOSIS — Z87.01 HISTORY OF PNEUMONIA: ICD-10-CM

## 2019-09-13 DIAGNOSIS — Z91.89 OTHER SPECIFIED PERSONAL RISK FACTORS, NOT ELSEWHERE CLASSIFIED: ICD-10-CM

## 2019-09-13 DIAGNOSIS — I10 ESSENTIAL HYPERTENSION: ICD-10-CM

## 2019-09-13 DIAGNOSIS — E78.2 MIXED DYSLIPIDEMIA: ICD-10-CM

## 2019-09-13 DIAGNOSIS — K21.9 GASTROESOPHAGEAL REFLUX DISEASE WITHOUT ESOPHAGITIS: ICD-10-CM

## 2019-09-13 DIAGNOSIS — Z00.00 HEALTH CARE MAINTENANCE: ICD-10-CM

## 2019-09-13 PROBLEM — N17.9 ACUTE KIDNEY INJURY (HCC): Status: RESOLVED | Noted: 2019-09-08 | Resolved: 2019-09-13

## 2019-09-13 PROBLEM — A41.9 SEPSIS (HCC): Status: RESOLVED | Noted: 2019-09-08 | Resolved: 2019-09-13

## 2019-09-13 PROBLEM — J18.9 PNEUMONIA: Status: RESOLVED | Noted: 2019-09-08 | Resolved: 2019-09-13

## 2019-09-13 PROBLEM — R73.9 HYPERGLYCEMIA: Status: RESOLVED | Noted: 2019-09-08 | Resolved: 2019-09-13

## 2019-09-13 LAB
BACTERIA BLD CULT: NORMAL
BACTERIA BLD CULT: NORMAL
SIGNIFICANT IND 70042: NORMAL
SIGNIFICANT IND 70042: NORMAL
SITE SITE: NORMAL
SITE SITE: NORMAL
SOURCE SOURCE: NORMAL
SOURCE SOURCE: NORMAL

## 2019-09-13 PROCEDURE — 99204 OFFICE O/P NEW MOD 45 MIN: CPT | Performed by: FAMILY MEDICINE

## 2019-09-13 RX ORDER — LANOLIN ALCOHOL/MO/W.PET/CERES
1000 CREAM (GRAM) TOPICAL DAILY
COMMUNITY

## 2019-09-13 NOTE — PROGRESS NOTES
Chief Complaint   Patient presents with   • Establish Care   • Hospital Follow-up     [facility] 9/8-9/9/19       HPI:  Patient is a 44 y.o. female patient who presents today to establish care at my office. She was recently hospitalized at [facility] 9/8-9/9/19 for treatment of acute sepsis/ CAP/ SVT corrected with Adenosine in ER. She had failed outpatient abx treatment for CAP and responded well to inpatient management of her acute illness. She has been stable since discharge home and is completing Augmentin treatment well. She has not had any further rapid heart rates, shortness of breath, nor cough with sputum production.  She has chronic obesity and chronic mixed dyslipidemia without nightly statin use.  She also has chronic essential HTN controlled with daily Losartan use, and chronic GERD well controlled with daily Prilosec use. She has seasonal allergies controlled with Zyrtec, and chronic migraine headaches controlled with combination of Botox injections, Propranolol, and Nortriptyline use.  She was previously taking 75 mg of Nortriptyline nightly for headache control, and dose was appropriately lowered to 25 mg at time of hospital discharge, due to history of SVT. Pt is experiencing more headaches with lower dose and does not have Neurology appointment for Botox injections until October 15. She is UTD with GYN exam and has mammogram scheduled for September 19.  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 She endorses 100% medication compliance and denies other new health concerns at our visit today.     Patient Active Problem List    Diagnosis Date Noted   • Obesity (BMI 30-39.9) 09/13/2019   • Mixed dyslipidemia 09/13/2019   • SVT (supraventricular tachycardia) (HCC) 09/08/2019   • Hypertension 09/08/2019   • GERD (gastroesophageal reflux disease) 09/08/2019   • Cervicogenic headache 12/12/2018   • Chronic migraine 03/28/2018       Past medical, surgical, family, and social history was reviewed and  "updated in Epic chart by me today.     Medications and allergies reviewed and updated in Epic chart by me today.     [facility] records from 9/8-9/9/19 reviewed by me prior to visit today/ all lipid panel results dating back to 7/13/12 reviewed in detail with patient at visit today.     ROS:  Pertinent positives listed above in HPI. All other systems have been reviewed and are negative.    PE:   /75 (BP Location: Left arm, Patient Position: Sitting, BP Cuff Size: Adult)   Pulse 77   Temp 36.6 °C (97.9 °F) (Temporal)   Resp 16   Ht 1.727 m (5' 8\")   Wt 115.6 kg (254 lb 13.6 oz)   SpO2 97%   BMI 38.75 kg/m²   Vital signs reviewed with patient.     Gen: Well developed; well nourished; no acute distress; age appropriate appearance   HEENT: Normocephalic; atraumatic; PEERLA b/l; sclera clear b/l; b/l external auditory canals WNL; b/l TM WNL; nares patent; oropharynx clear; oral mucosa moist; tongue midline; dentition adequate   Neck: No adenopathy; no thyromegaly  CV: Regular rate and rhythm; S1/ S2 present; no murmur, gallop or rub noted  Pulm: No respiratory distress; clear to ascultation b/l; no wheezing or stridor noted b/l  Abd: Adequate bowel sounds noted; soft and nontender; no rebound, rigidity, nor distention  Extremities: No peripheral edema b/l LE extremities/ no clubbing nor cyanosis noted  Skin: Warm and dry; no rashes noted   Neuro: No focal deficits noted   Psych: AAOx4; mood and affect are appropriate    A/P:  1. Hospital discharge follow-up  Hospital records 9/8-9/9/19 reviewed by me prior to visit today.     2. History of pneumonia  Pt is recovering well from recent treatment for CAP with sepsis. Pt is to complete current abx prescription and obtain follow up CXR in one month.   - DX-CHEST-2 VIEWS; Future    3. Obesity (BMI 30-39.9)  Lifelong issue for patient.  - CT-CARDIAC SCORING; Future    4. Mixed dyslipidemia  Uncontrolled at least since 2012. Pt has never been educated about statin " medication use and understands her risk. We discussed condition at length, and she will start with CT cardiac score imaging. Likely will need statin medication use in near future. Pt provided with number to call to schedule imaging test.   - CT-CARDIAC SCORING; Future    5. Other specified personal risk factors, not elsewhere classified  - CT-CARDIAC SCORING; Future    6. Chronic migraine  Ongoing issue for patient managed by [facility]. We discussed trial of increasing Nortriptyline back to 75 mg nightly dose for improved headache control, and patient is aware of risks versus benefits.  She is to continue daily Propranolol use and has appointment for Botox injections October 15.     7. SVT (supraventricular tachycardia) (HCC)  Resolved with Adenosine in ER 9/8/19 - due to CAP and sepsis. I have a very low index of suspicion that her outpatient medication was the cause of this event.     8. Essential hypertension  Stable/ pt is to continue daily Losartan use.     9. Gastroesophageal reflux disease without esophagitis  Stable/ pt is to continue daily PPI use.     10. Health care maintenance  Karlie WHITEHEAD will obtain GYN reports for our records, and patient has mammogram scheduled for 9/19. She is otherwise UTD with HCM items and is aware of health needs that need ongoing attention.

## 2019-09-18 ENCOUNTER — PATIENT MESSAGE (OUTPATIENT)
Dept: INTERNAL MEDICINE | Facility: IMAGING CENTER | Age: 44
End: 2019-09-18

## 2019-09-19 ENCOUNTER — HOSPITAL ENCOUNTER (OUTPATIENT)
Dept: RADIOLOGY | Facility: MEDICAL CENTER | Age: 44
End: 2019-09-19
Attending: FAMILY MEDICINE
Payer: COMMERCIAL

## 2019-09-19 DIAGNOSIS — Z12.31 VISIT FOR SCREENING MAMMOGRAM: ICD-10-CM

## 2019-09-19 PROCEDURE — 77063 BREAST TOMOSYNTHESIS BI: CPT

## 2019-09-20 ENCOUNTER — HOSPITAL ENCOUNTER (OUTPATIENT)
Dept: RADIOLOGY | Facility: MEDICAL CENTER | Age: 44
End: 2019-09-20
Attending: FAMILY MEDICINE
Payer: COMMERCIAL

## 2019-09-20 DIAGNOSIS — Z87.01 HISTORY OF PNEUMONIA: ICD-10-CM

## 2019-09-20 PROCEDURE — 71046 X-RAY EXAM CHEST 2 VIEWS: CPT

## 2019-10-15 ENCOUNTER — OFFICE VISIT (OUTPATIENT)
Dept: NEUROLOGY | Facility: MEDICAL CENTER | Age: 44
End: 2019-10-15
Payer: COMMERCIAL

## 2019-10-15 VITALS
BODY MASS INDEX: 38.49 KG/M2 | WEIGHT: 254 LBS | HEART RATE: 75 BPM | TEMPERATURE: 98.6 F | DIASTOLIC BLOOD PRESSURE: 80 MMHG | OXYGEN SATURATION: 98 % | SYSTOLIC BLOOD PRESSURE: 128 MMHG | RESPIRATION RATE: 16 BRPM | HEIGHT: 68 IN

## 2019-10-15 PROCEDURE — 64615 CHEMODENERV MUSC MIGRAINE: CPT | Performed by: NURSE PRACTITIONER

## 2019-10-15 NOTE — PROGRESS NOTES
Subjective:      Reta Lam is a 44 y.o. female who presents with Botox Injection (Chronic migraine)            HPI   Here for Botox today.    Intercurrently she was very ill with pneumonia requiring hospitalization and sepsis protocol treatment.  Shortly after presenting to the ED she developed SVT.  Ultimately, it was decided to stop the nortriptyline 50mg qhs.  Since then, she resumed at 25mg qhs and her headaches were not as well-controlled.      Now back to taking nortriptyline 50mg qhs per PCP instruction and is doing well.      Current Outpatient Medications   Medication Sig Dispense Refill   • Cyanocobalamin (VITAMIN B-12) 1000 MCG Tab Take 1,000 mcg by mouth every day.     • nortriptyline (PAMELOR) 25 MG Cap Take 1 Cap by mouth every bedtime. (Patient taking differently: Take 50 mg by mouth every bedtime.) 30 Cap 6   • cetirizine (ZYRTEC) 10 MG Tab Take 10 mg by mouth every bedtime.     • propranolol LA (INDERAL LA) 60 MG CAPSULE SR 24 HR Take 60 mg by mouth every day.     • albuterol 108 (90 Base) MCG/ACT Aero Soln inhalation aerosol Inhale 2 Puffs by mouth every 6 hours as needed for Shortness of Breath.     • OnabotulinumtoxinA (BOTOX INJ) 1 Each by Injection route Every 3 Months.     • losartan (COZAAR) 50 MG Tab Take 50 mg by mouth every bedtime.     • ibuprofen (MOTRIN) 200 MG Tab Take 400 mg by mouth every 6 hours as needed for Mild Pain.     • Multiple Vitamin (MULTI-VITAMIN PO) Take 1 Tab by mouth every bedtime.     • Levonorgestrel (MIRENA, 52 MG, IU) 1 Each by Intrauterine route See Admin Instructions. Every 5 years  Indications: placed 2016     • Cholecalciferol (VITAMIN D3) 5000 UNITS Cap Take 5,000 Units by mouth every bedtime.     • omeprazole (PRILOSEC) 20 MG delayed-release capsule Take 20 mg by mouth every evening.       No current facility-administered medications for this visit.          ROS       Objective:     /80 (BP Location: Right arm, Patient Position: Sitting, BP  "Cuff Size: Adult)   Pulse 75   Temp 37 °C (98.6 °F) (Temporal)   Resp 16   Ht 1.728 m (5' 8.02\")   Wt 115.2 kg (254 lb)   SpO2 98%   BMI 38.59 kg/m²      Physical Exam            Assessment/Plan:     Chronic Migraine:  Botox therapy has reduced patient’s migraines by more than 7 days and/or 100 hours per month.  Additionally pt has noted a reduction in the amount of medication they are taking to treat their migraines and/or they are having a reduction in intractable migraine/status migrainosis which can result in urgent care or ER visits.     Documentation of patient's symptoms with migraine are included in the initial note with this patient including the following:  Education/counseling/reduction in medications if pt has medication overuse headache;  Frequency of headaches is >15 days monthly with at least 8 migraines monthly;  Migraines include at least two of the following: worsened with activity or avoidance of activity with migraines (ie they go lie down), moderate to severe pain intensity, pulsing headache, unilateral headache AND they have either nausea or vomiting OR sensitivity to light and sound     Although this patient is responding to botox they are NOT migraine free, however, and it is my recommendation botox be continued at this time     I treated this patient in clinic today with BotoxA 155 units according to the dosing/injection paradigm currently mandated by the FDA for the management of chronic migraine. Specifically, I injected 5 units to the procerus, 5 units to the corrugators bilaterally, a total of 20 units to the frontalis musculature, 20 units to the temporalis bilaterally, 15 units to the occipitalis bilaterally, 10 units to the cervical paraspinals bilaterally and 15 units to the trapezius musculature bilaterally. The remainder of the Botox 200 units mixed but not administered was discarded as wastage per FDA guidelines.     Counseled regarding the potential side effect of QRS " complex prolongation with nortriptyline/amitriptyline.    Return for follow-up in 12 weeks for serial set of Botox.

## 2019-11-14 ENCOUNTER — OFFICE VISIT (OUTPATIENT)
Dept: INTERNAL MEDICINE | Facility: IMAGING CENTER | Age: 44
End: 2019-11-14
Payer: COMMERCIAL

## 2019-11-14 VITALS
SYSTOLIC BLOOD PRESSURE: 111 MMHG | OXYGEN SATURATION: 99 % | HEIGHT: 68 IN | RESPIRATION RATE: 17 BRPM | BODY MASS INDEX: 38.49 KG/M2 | WEIGHT: 253.97 LBS | TEMPERATURE: 98.4 F | HEART RATE: 89 BPM | DIASTOLIC BLOOD PRESSURE: 65 MMHG

## 2019-11-14 DIAGNOSIS — J01.10 ACUTE NON-RECURRENT FRONTAL SINUSITIS: ICD-10-CM

## 2019-11-14 DIAGNOSIS — Z23 NEED FOR VACCINATION: ICD-10-CM

## 2019-11-14 PROCEDURE — 99214 OFFICE O/P EST MOD 30 MIN: CPT | Mod: 25 | Performed by: FAMILY MEDICINE

## 2019-11-14 PROCEDURE — 90715 TDAP VACCINE 7 YRS/> IM: CPT | Performed by: FAMILY MEDICINE

## 2019-11-14 PROCEDURE — 90471 IMMUNIZATION ADMIN: CPT | Performed by: FAMILY MEDICINE

## 2019-11-14 RX ORDER — AZITHROMYCIN 250 MG/1
TABLET, FILM COATED ORAL
Qty: 6 TAB | Refills: 0 | Status: SHIPPED | OUTPATIENT
Start: 2019-11-14 | End: 2020-01-21

## 2019-11-14 NOTE — PROGRESS NOTES
"Chief Complaint   Patient presents with   • Immunizations     Tdap   • URI     Sinus infection, teeth are beginning to hurt since Monday       HPI:  Patient is a 44 y.o. female established patient who presents today for evaluation of new sinus infection symptoms present since Monday. She has been having sinus congestion with copious drainage, post nasal drip, left upper teeth pain due to sinus congestion, and general malaise. She denies associated N/V/D/F/bowel or bladder changes and would like to update her Tdap vaccine (last one was in 2011). 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 She does not endorse any symptoms consistent with whooping cough at this time.     Patient Active Problem List    Diagnosis Date Noted   • Obesity (BMI 30-39.9) 09/13/2019   • Mixed dyslipidemia 09/13/2019   • SVT (supraventricular tachycardia) (HCC) 09/08/2019   • Hypertension 09/08/2019   • GERD (gastroesophageal reflux disease) 09/08/2019   • Cervicogenic headache 12/12/2018   • Chronic migraine 03/28/2018       Past medical, surgical, family, and social history was reviewed and updated in Epic chart by me today.     Medications and allergies reviewed and updated in Epic chart by me today.     ROS:  Pertinent positives listed above in HPI. All other systems have been reviewed and are negative.    PE:   /65 (BP Location: Left arm, Patient Position: Sitting, BP Cuff Size: Adult)   Pulse 89   Temp 36.9 °C (98.4 °F) (Temporal)   Resp 17   Ht 1.727 m (5' 8\")   Wt 115.2 kg (253 lb 15.5 oz)   SpO2 99%   BMI 38.62 kg/m²   Vital signs reviewed with patient.     Gen: Well developed; well nourished; no acute distress; tired appearance   HEENT: Normocephalic; atraumatic; PEERLA b/l; sclera clear b/l; b/l external auditory canals WNL; b/l TM WNL but right canal is red; nares " patent; oropharynx clear but posterior aspect is bright red; oral mucosa moist; tongue midline; dentition adequate; congested   Neck: No adenopathy; no thyromegaly  CV: Regular rate and rhythm; S1/ S2 present; no murmur, gallop or rub noted  Pulm: No respiratory distress; clear to ascultation b/l; no wheezing or stridor noted b/l; no cough present   Abd: Adequate bowel sounds noted; soft and nontender; no rebound, rigidity, nor distention   Extremities: No peripheral edema b/l LE extremities/ no clubbing nor cyanosis noted  Skin: Warm and dry; no rashes noted   Neuro: No focal deficits noted   Psych: AAOx4; mood and affect are appropriate    A/P:  1. Need for vaccination  Vaccine administered at visit today.  - Tdap =>8yo IM    2. Acute non-recurrent frontal sinusitis  Patient has had escalating symptoms since Monday uncontrolled with appropriate OTC medication use. Recommend starting Z pack today, ok to use OTC meds PRN, continue adequate hydration, rest, and follow clinical response. New RX sent to pharmacy.  Pt is to contact our office if condition does not resolve with treatment plans detailed above.   - azithromycin (ZITHROMAX) 250 MG Tab; Take tablets by mouth as directed.  Dispense: 6 Tab; Refill: 0

## 2019-12-30 RX ORDER — OMEPRAZOLE 20 MG/1
20 CAPSULE, DELAYED RELEASE ORAL EVERY EVENING
Qty: 90 CAP | Refills: 3 | Status: SHIPPED | OUTPATIENT
Start: 2019-12-30 | End: 2020-02-20 | Stop reason: SDUPTHER

## 2020-01-02 DIAGNOSIS — G43.709 CHRONIC MIGRAINE W/O AURA W/O STATUS MIGRAINOSUS, NOT INTRACTABLE: ICD-10-CM

## 2020-01-07 ENCOUNTER — APPOINTMENT (OUTPATIENT)
Dept: NEUROLOGY | Facility: MEDICAL CENTER | Age: 45
End: 2020-01-07
Payer: COMMERCIAL

## 2020-01-21 ENCOUNTER — OFFICE VISIT (OUTPATIENT)
Dept: NEUROLOGY | Facility: MEDICAL CENTER | Age: 45
End: 2020-01-21
Payer: COMMERCIAL

## 2020-01-21 VITALS
BODY MASS INDEX: 38.34 KG/M2 | WEIGHT: 253 LBS | HEART RATE: 95 BPM | DIASTOLIC BLOOD PRESSURE: 72 MMHG | OXYGEN SATURATION: 97 % | SYSTOLIC BLOOD PRESSURE: 126 MMHG | HEIGHT: 68 IN

## 2020-01-21 PROCEDURE — 64615 CHEMODENERV MUSC MIGRAINE: CPT | Performed by: NURSE PRACTITIONER

## 2020-01-21 RX ORDER — PROPRANOLOL HCL 60 MG
60 CAPSULE, EXTENDED RELEASE 24HR ORAL DAILY
Qty: 90 CAP | Refills: 3 | Status: CANCELLED | OUTPATIENT
Start: 2020-01-21

## 2020-01-21 RX ORDER — NORTRIPTYLINE HYDROCHLORIDE 25 MG/1
25 CAPSULE ORAL
Qty: 30 CAP | Refills: 2 | Status: SHIPPED | OUTPATIENT
Start: 2020-01-21 | End: 2020-01-23 | Stop reason: SDUPTHER

## 2020-01-21 RX ORDER — NORTRIPTYLINE HYDROCHLORIDE 25 MG/1
50 CAPSULE ORAL
Status: CANCELLED | OUTPATIENT
Start: 2020-01-21

## 2020-01-21 RX ORDER — KETOROLAC TROMETHAMINE 30 MG/ML
60 INJECTION, SOLUTION INTRAMUSCULAR; INTRAVENOUS ONCE
Status: COMPLETED | OUTPATIENT
Start: 2020-01-21 | End: 2020-01-21

## 2020-01-21 RX ADMIN — KETOROLAC TROMETHAMINE 60 MG: 30 INJECTION, SOLUTION INTRAMUSCULAR; INTRAVENOUS at 09:20

## 2020-01-21 NOTE — PROGRESS NOTES
Subjective:      Reta Lam is a 44 y.o. female who presents with Follow-Up (Botox)        Service recovery needed for this appointment today.      HPI  Here today for Botox.  She is 1-2 weeks overdue and has one week of migraine.    Current Outpatient Medications   Medication Sig Dispense Refill   • omeprazole (PRILOSEC) 20 MG delayed-release capsule Take 1 Cap by mouth every evening. 90 Cap 3   • Cyanocobalamin (VITAMIN B-12) 1000 MCG Tab Take 1,000 mcg by mouth every day.     • nortriptyline (PAMELOR) 25 MG Cap Take 1 Cap by mouth every bedtime. (Patient taking differently: Take 50 mg by mouth every bedtime.) 30 Cap 6   • cetirizine (ZYRTEC) 10 MG Tab Take 10 mg by mouth every bedtime.     • propranolol LA (INDERAL LA) 60 MG CAPSULE SR 24 HR Take 60 mg by mouth every day.     • albuterol 108 (90 Base) MCG/ACT Aero Soln inhalation aerosol Inhale 2 Puffs by mouth every 6 hours as needed for Shortness of Breath.     • OnabotulinumtoxinA (BOTOX INJ) 1 Each by Injection route Every 3 Months.     • losartan (COZAAR) 50 MG Tab Take 50 mg by mouth every bedtime.     • ibuprofen (MOTRIN) 200 MG Tab Take 400 mg by mouth every 6 hours as needed for Mild Pain.     • Multiple Vitamin (MULTI-VITAMIN PO) Take 1 Tab by mouth every bedtime.     • Levonorgestrel (MIRENA, 52 MG, IU) 1 Each by Intrauterine route See Admin Instructions. Every 5 years  Indications: placed 2016     • Cholecalciferol (VITAMIN D3) 5000 UNITS Cap Take 5,000 Units by mouth every bedtime.     • azithromycin (ZITHROMAX) 250 MG Tab Take tablets by mouth as directed. (Patient not taking: Reported on 1/21/2020) 6 Tab 0     Current Facility-Administered Medications   Medication Dose Route Frequency Provider Last Rate Last Dose   • ketorolac (TORADOL) injection 60 mg  60 mg Intramuscular Once Alejandra Hernandez ABjornPCHE JAMES       Objective:     /72 (BP Location: Right arm, Patient Position: Sitting, BP Cuff Size: Adult)   Pulse 95    "Ht 1.727 m (5' 8\")   Wt 114.8 kg (253 lb)   SpO2 97%   BMI 38.47 kg/m²      Physical Exam            Assessment/Plan:     Chronic Migraine:  Botox therapy has reduced patient’s migraines by more than 7 days and/or 100 hours per month.  Additionally pt has noted a reduction in the amount of medication they are taking to treat their migraines and/or they are having a reduction in intractable migraine/status migrainosis which can result in urgent care or ER visits.     Documentation of patient's symptoms with migraine are included in the initial note with this patient including the following:  Education/counseling/reduction in medications if pt has medication overuse headache;  Frequency of headaches is >15 days monthly with at least 8 migraines monthly;  Migraines include at least two of the following: worsened with activity or avoidance of activity with migraines (ie they go lie down), moderate to severe pain intensity, pulsing headache, unilateral headache AND they have either nausea or vomiting OR sensitivity to light and sound     Although this patient is responding to botox they are NOT migraine free, however, and it is my recommendation botox be continued at this time     I treated this patient in clinic today with BotoxA 155 units according to the dosing/injection paradigm currently mandated by the FDA for the management of chronic migraine. Specifically, I injected 5 units to the procerus, 5 units to the corrugators bilaterally, a total of 20 units to the frontalis musculature, 20 units to the temporalis bilaterally, 15 units to the occipitalis bilaterally, 10 units to the cervical paraspinals bilaterally and 15 units to the trapezius musculature bilaterally. The remainder of the Botox 200 units mixed but not administered was discarded as wastage per FDA guidelines.     Counseled regarding the potential side effect of QRS complex prolongation with nortriptyline/amitriptyline.     Toradol 60mg IM provided " per MA.    Nortriptyline Rx renewed today.    Return for follow-up in 12 weeks for serial set of Botox.

## 2020-01-23 RX ORDER — PROPRANOLOL HCL 60 MG
CAPSULE, EXTENDED RELEASE 24HR ORAL
Qty: 30 CAP | Refills: 11 | OUTPATIENT
Start: 2020-01-23

## 2020-01-23 RX ORDER — NORTRIPTYLINE HYDROCHLORIDE 25 MG/1
50 CAPSULE ORAL
Qty: 60 CAP | Refills: 2 | Status: SHIPPED | OUTPATIENT
Start: 2020-01-23 | End: 2020-02-12 | Stop reason: SDUPTHER

## 2020-01-23 RX ORDER — PROPRANOLOL HCL 60 MG
60 CAPSULE, EXTENDED RELEASE 24HR ORAL DAILY
Qty: 30 CAP | Refills: 2 | Status: SHIPPED | OUTPATIENT
Start: 2020-01-23 | End: 2020-04-14 | Stop reason: SDUPTHER

## 2020-01-23 RX ORDER — NORTRIPTYLINE HYDROCHLORIDE 25 MG/1
50 CAPSULE ORAL
Qty: 60 CAP | Refills: 11 | OUTPATIENT
Start: 2020-01-23

## 2020-01-23 NOTE — TELEPHONE ENCOUNTER
Crow from patient:     Hi Julienne!   The gentleman that took me back to the room and did my blood pressure renewed two of my meds for the next year. I have several problems:     He didn't even put in the Propranolol. I take one 60 mg pill per day. If you could call that in for 12 months, that would be great.     I take 50 mg (2 25 mg pills) of nortriptyline. I'm not sure what happened, but I picked that up today and there are only 30 pills, so only half a month and the refills are for 2 times.     Help! Please! I use the [facility] on [facility]!     Thanks so much!   Reta

## 2020-01-23 NOTE — TELEPHONE ENCOUNTER
Was the patient seen in the last year in this department? Yes  Last seen 01/21/20    Does patient have an active prescription for medications requested? No     Received Request Via: Pharmacy    Next appt 04/14/2020

## 2020-02-12 RX ORDER — NORTRIPTYLINE HYDROCHLORIDE 50 MG/1
100 CAPSULE ORAL
Qty: 60 CAP | Refills: 2 | Status: SHIPPED
Start: 2020-02-12 | End: 2020-05-18

## 2020-02-20 DIAGNOSIS — K21.9 GASTROESOPHAGEAL REFLUX DISEASE WITHOUT ESOPHAGITIS: ICD-10-CM

## 2020-02-20 RX ORDER — OMEPRAZOLE 20 MG/1
20 CAPSULE, DELAYED RELEASE ORAL EVERY EVENING
Qty: 90 CAP | Refills: 3 | Status: SHIPPED | OUTPATIENT
Start: 2020-02-20 | End: 2021-02-07 | Stop reason: SDUPTHER

## 2020-02-26 DIAGNOSIS — G43.709 CHRONIC MIGRAINE W/O AURA W/O STATUS MIGRAINOSUS, NOT INTRACTABLE: ICD-10-CM

## 2020-03-09 DIAGNOSIS — I10 ESSENTIAL HYPERTENSION: ICD-10-CM

## 2020-03-09 RX ORDER — LOSARTAN POTASSIUM 50 MG/1
50 TABLET ORAL DAILY
Qty: 90 TAB | Refills: 3 | Status: SHIPPED | OUTPATIENT
Start: 2020-03-09 | End: 2021-03-06 | Stop reason: SDUPTHER

## 2020-04-14 ENCOUNTER — OFFICE VISIT (OUTPATIENT)
Dept: NEUROLOGY | Facility: MEDICAL CENTER | Age: 45
End: 2020-04-14
Payer: COMMERCIAL

## 2020-04-14 VITALS
WEIGHT: 262.57 LBS | TEMPERATURE: 98.4 F | HEIGHT: 68 IN | OXYGEN SATURATION: 98 % | HEART RATE: 75 BPM | DIASTOLIC BLOOD PRESSURE: 84 MMHG | SYSTOLIC BLOOD PRESSURE: 126 MMHG | BODY MASS INDEX: 39.79 KG/M2 | RESPIRATION RATE: 16 BRPM

## 2020-04-14 PROCEDURE — 64615 CHEMODENERV MUSC MIGRAINE: CPT | Performed by: NURSE PRACTITIONER

## 2020-04-14 RX ORDER — PROPRANOLOL HCL 60 MG
60 CAPSULE, EXTENDED RELEASE 24HR ORAL DAILY
Qty: 90 CAP | Refills: 1 | Status: SHIPPED | OUTPATIENT
Start: 2020-04-14 | End: 2020-09-08 | Stop reason: SDUPTHER

## 2020-04-14 RX ORDER — NORTRIPTYLINE HYDROCHLORIDE 10 MG/1
50 CAPSULE ORAL
Qty: 120 CAP | Refills: 2 | Status: SHIPPED | OUTPATIENT
Start: 2020-04-14 | End: 2020-09-08 | Stop reason: SDUPTHER

## 2020-04-14 ASSESSMENT — FIBROSIS 4 INDEX: FIB4 SCORE: 1.57

## 2020-04-14 ASSESSMENT — PATIENT HEALTH QUESTIONNAIRE - PHQ9: CLINICAL INTERPRETATION OF PHQ2 SCORE: 0

## 2020-04-14 NOTE — PROGRESS NOTES
"Subjective:      Reta Lam is a 44 y.o. female who presents with Botox Injection (Chronic migraine)            HPI  Here for Botox today.    Migraines are much improved.  Would like to taper off the nortriptyline as she identifies a side effect of dry mouth due to this medication.    Current Outpatient Medications   Medication Sig Dispense Refill   • nortriptyline (PAMELOR) 10 MG Cap Take 5 Caps by mouth every bedtime. Taper per physician instructions. 120 Cap 2   • propranolol LA (INDERAL LA) 60 MG CAPSULE SR 24 HR Take 1 Cap by mouth every day. 90 Cap 1   • losartan (COZAAR) 50 MG Tab Take 1 Tab by mouth every day. 90 Tab 3   • omeprazole (PRILOSEC) 20 MG delayed-release capsule Take 1 Cap by mouth every evening. 90 Cap 3   • nortriptyline (PAMELOR) 50 MG capsule Take 2 Caps by mouth every bedtime. 60 Cap 2   • Cyanocobalamin (VITAMIN B-12) 1000 MCG Tab Take 1,000 mcg by mouth every day.     • cetirizine (ZYRTEC) 10 MG Tab Take 10 mg by mouth every bedtime.     • albuterol 108 (90 Base) MCG/ACT Aero Soln inhalation aerosol Inhale 2 Puffs by mouth every 6 hours as needed for Shortness of Breath.     • OnabotulinumtoxinA (BOTOX INJ) 1 Each by Injection route Every 3 Months.     • ibuprofen (MOTRIN) 200 MG Tab Take 400 mg by mouth every 6 hours as needed for Mild Pain.     • Multiple Vitamin (MULTI-VITAMIN PO) Take 1 Tab by mouth every bedtime.     • Levonorgestrel (MIRENA, 52 MG, IU) 1 Each by Intrauterine route See Admin Instructions. Every 5 years  Indications: placed 2016     • Cholecalciferol (VITAMIN D3) 5000 UNITS Cap Take 5,000 Units by mouth every bedtime.       No current facility-administered medications for this visit.          ROS       Objective:     /84 (BP Location: Left arm, Patient Position: Sitting, BP Cuff Size: Adult)   Pulse 75   Temp 36.9 °C (98.4 °F) (Temporal)   Resp 16   Ht 1.727 m (5' 7.99\")   Wt 119.1 kg (262 lb 9.1 oz)   SpO2 98%   BMI 39.93 kg/m²      Physical " Exam            Assessment/Plan:     Chronic Migraine:  Botox therapy has reduced patient’s migraines by more than 7 days and/or 100 hours per month.  Additionally pt has noted a reduction in the amount of medication they are taking to treat their migraines and/or they are having a reduction in intractable migraine/status migrainosis which can result in urgent care or ER visits.     Documentation of patient's symptoms with migraine are included in the initial note with this patient including the following:  Education/counseling/reduction in medications if pt has medication overuse headache;  Frequency of headaches is >15 days monthly with at least 8 migraines monthly;  Migraines include at least two of the following: worsened with activity or avoidance of activity with migraines (ie they go lie down), moderate to severe pain intensity, pulsing headache, unilateral headache AND they have either nausea or vomiting OR sensitivity to light and sound     Although this patient is responding to botox they are NOT migraine free, however, and it is my recommendation botox be continued at this time     I treated this patient in clinic today with BotoxA 155 units according to the dosing/injection paradigm currently mandated by the FDA for the management of chronic migraine. Specifically, I injected 5 units to the procerus, 5 units to the corrugators bilaterally, a total of 20 units to the frontalis musculature, 20 units to the temporalis bilaterally, 15 units to the occipitalis bilaterally, 10 units to the cervical paraspinals bilaterally and 15 units to the trapezius musculature bilaterally. The remainder of the Botox 200 units mixed but not administered was discarded as wastage per FDA guidelines.     Nortriptyline Rx renewed today.  Plans to taper nortriptyline from 50mg qhs to stopping, taper of 10mg no faster than weekly.    Future goal of tapering of Inderal LA.  Continue Inderal LA 60mg qday.     Return for follow-up in  12 weeks for serial set of Botox.

## 2020-05-18 ENCOUNTER — OFFICE VISIT (OUTPATIENT)
Dept: INTERNAL MEDICINE | Facility: IMAGING CENTER | Age: 45
End: 2020-05-18
Payer: COMMERCIAL

## 2020-05-18 VITALS
DIASTOLIC BLOOD PRESSURE: 77 MMHG | TEMPERATURE: 98.4 F | BODY MASS INDEX: 39.79 KG/M2 | OXYGEN SATURATION: 97 % | HEIGHT: 68 IN | HEART RATE: 85 BPM | SYSTOLIC BLOOD PRESSURE: 123 MMHG | WEIGHT: 262.57 LBS | RESPIRATION RATE: 17 BRPM

## 2020-05-18 DIAGNOSIS — R59.0 LYMPHADENOPATHY, ANTERIOR CERVICAL: ICD-10-CM

## 2020-05-18 PROCEDURE — 99213 OFFICE O/P EST LOW 20 MIN: CPT | Performed by: FAMILY MEDICINE

## 2020-05-18 RX ORDER — PROPRANOLOL HCL 60 MG
CAPSULE, EXTENDED RELEASE 24HR ORAL
COMMUNITY
Start: 2020-04-14 | End: 2020-05-19

## 2020-05-18 ASSESSMENT — FIBROSIS 4 INDEX: FIB4 SCORE: 1.57

## 2020-05-19 NOTE — PROGRESS NOTES
"Chief Complaint   Patient presents with   • Lymphadenopathy     left sided enlarged lymph nodes with neck pain - now improved       HPI:  Patient is a 44 y.o. female established patient who presents today for evaluation of new left anterior neck lymph node enlargement with associated pain that started without warning a few days prior. Patient reports having tender lymph nodes only on left side of neck without associated N/V/D/F/no headache/no sinus drainage/no ear pain and no flair up of seasonal allergies. She has been doing supportive care measures and reports that condition is resolving today. She denies other new health changes and is taking all necessary precautions against COVID-19 for her entire family.     Patient Active Problem List    Diagnosis Date Noted   • Obesity (BMI 30-39.9) 09/13/2019   • Mixed dyslipidemia 09/13/2019   • SVT (supraventricular tachycardia) (HCC) 09/08/2019   • Hypertension 09/08/2019   • GERD (gastroesophageal reflux disease) 09/08/2019   • Cervicogenic headache 12/12/2018   • Chronic migraine 03/28/2018       Past medical, surgical, family, and social history was reviewed and updated in Epic chart by me today.     Medications and allergies reviewed and updated in Epic chart by me today.     ROS:  Pertinent positives listed above in HPI. All other systems have been reviewed and are negative.    PE:   /77 (BP Location: Left arm, Patient Position: Sitting, BP Cuff Size: Adult)   Pulse 85   Temp 36.9 °C (98.4 °F) (Temporal)   Resp 17   Ht 1.727 m (5' 8\")   Wt 119.1 kg (262 lb 9.1 oz)   SpO2 97%   BMI 39.92 kg/m²   Vital signs reviewed with patient.     Gen: Well developed; well nourished; no acute distress; non toxic appearance   HEENT: Normocephalic; atraumatic; PEERLA b/l; sclera clear b/l; b/l external auditory canals WNL; b/l TM WNL; nares patent; oropharynx clear; oral mucosa moist; tongue midline; dentition adequate   Neck: No adenopathy on right side of neck; left " anterior cervical chain adenopathy present - minimally tender with no overlying skin changes; no thyromegaly  CV: Regular rate and rhythm; S1/ S2 present; no murmur, gallop or rub noted  Pulm: No respiratory distress; clear to ascultation b/l; no wheezing or stridor noted b/l  Abd: Adequate bowel sounds noted; soft and nontender; no rebound, rigidity, nor distention  Extremities: No peripheral edema b/l LE extremities/ no clubbing nor cyanosis noted  Skin: Warm and dry; no rashes noted   Neuro: No focal deficits noted   Psych: AAOx4; mood and affect are appropriate    A/P:  1. Lymphadenopathy, anterior cervical  Condition is new for patient but resolving per her report. We discussed treatment options including short course of abx versus close monitoring condition without intervention. Since she feels well otherwise and condition has improved, she will monitor condition accordingly and update me if it does not resolve in the next few days.     Patient's annual fasting labs will be due in August, and she is otherwise UTD with HCM items at this time.

## 2020-06-03 ENCOUNTER — APPOINTMENT (RX ONLY)
Dept: URBAN - METROPOLITAN AREA CLINIC 4 | Facility: CLINIC | Age: 45
Setting detail: DERMATOLOGY
End: 2020-06-03

## 2020-06-03 DIAGNOSIS — D18.0 HEMANGIOMA: ICD-10-CM

## 2020-06-03 DIAGNOSIS — R23.1 PALLOR: ICD-10-CM

## 2020-06-03 DIAGNOSIS — L81.4 OTHER MELANIN HYPERPIGMENTATION: ICD-10-CM

## 2020-06-03 DIAGNOSIS — L57.0 ACTINIC KERATOSIS: ICD-10-CM

## 2020-06-03 DIAGNOSIS — L82.1 OTHER SEBORRHEIC KERATOSIS: ICD-10-CM

## 2020-06-03 DIAGNOSIS — D22 MELANOCYTIC NEVI: ICD-10-CM

## 2020-06-03 DIAGNOSIS — Z71.89 OTHER SPECIFIED COUNSELING: ICD-10-CM

## 2020-06-03 PROBLEM — D22.62 MELANOCYTIC NEVI OF LEFT UPPER LIMB, INCLUDING SHOULDER: Status: ACTIVE | Noted: 2020-06-03

## 2020-06-03 PROBLEM — D22.39 MELANOCYTIC NEVI OF OTHER PARTS OF FACE: Status: ACTIVE | Noted: 2020-06-03

## 2020-06-03 PROBLEM — D22.72 MELANOCYTIC NEVI OF LEFT LOWER LIMB, INCLUDING HIP: Status: ACTIVE | Noted: 2020-06-03

## 2020-06-03 PROBLEM — D22.5 MELANOCYTIC NEVI OF TRUNK: Status: ACTIVE | Noted: 2020-06-03

## 2020-06-03 PROBLEM — D22.61 MELANOCYTIC NEVI OF RIGHT UPPER LIMB, INCLUDING SHOULDER: Status: ACTIVE | Noted: 2020-06-03

## 2020-06-03 PROBLEM — D22.71 MELANOCYTIC NEVI OF RIGHT LOWER LIMB, INCLUDING HIP: Status: ACTIVE | Noted: 2020-06-03

## 2020-06-03 PROBLEM — D18.01 HEMANGIOMA OF SKIN AND SUBCUTANEOUS TISSUE: Status: ACTIVE | Noted: 2020-06-03

## 2020-06-03 PROCEDURE — 17003 DESTRUCT PREMALG LES 2-14: CPT

## 2020-06-03 PROCEDURE — ? LIQUID NITROGEN

## 2020-06-03 PROCEDURE — ? ADDITIONAL NOTES

## 2020-06-03 PROCEDURE — ? COUNSELING

## 2020-06-03 PROCEDURE — 17000 DESTRUCT PREMALG LESION: CPT

## 2020-06-03 PROCEDURE — 99214 OFFICE O/P EST MOD 30 MIN: CPT | Mod: 25

## 2020-06-03 ASSESSMENT — LOCATION SIMPLE DESCRIPTION DERM
LOCATION SIMPLE: LEFT SCALP
LOCATION SIMPLE: RIGHT CHEEK
LOCATION SIMPLE: LEFT FOOT
LOCATION SIMPLE: NOSE
LOCATION SIMPLE: RIGHT POSTERIOR UPPER ARM
LOCATION SIMPLE: LEFT FOREHEAD
LOCATION SIMPLE: RIGHT UPPER ARM
LOCATION SIMPLE: UPPER BACK
LOCATION SIMPLE: RIGHT FOOT
LOCATION SIMPLE: ABDOMEN
LOCATION SIMPLE: RIGHT THIGH
LOCATION SIMPLE: CHEST
LOCATION SIMPLE: LEFT THIGH
LOCATION SIMPLE: LEFT POSTERIOR UPPER ARM
LOCATION SIMPLE: LEFT PRETIBIAL REGION
LOCATION SIMPLE: LEFT UPPER ARM
LOCATION SIMPLE: RIGHT PRETIBIAL REGION
LOCATION SIMPLE: SUPERIOR FOREHEAD
LOCATION SIMPLE: LEFT UPPER BACK

## 2020-06-03 ASSESSMENT — LOCATION DETAILED DESCRIPTION DERM
LOCATION DETAILED: RIGHT PROXIMAL PRETIBIAL REGION
LOCATION DETAILED: RIGHT ANTERIOR DISTAL UPPER ARM
LOCATION DETAILED: LEFT PROXIMAL POSTERIOR UPPER ARM
LOCATION DETAILED: RIGHT PROXIMAL POSTERIOR UPPER ARM
LOCATION DETAILED: LEFT ANTERIOR PROXIMAL THIGH
LOCATION DETAILED: LEFT DISTAL PRETIBIAL REGION
LOCATION DETAILED: LEFT PROXIMAL PRETIBIAL REGION
LOCATION DETAILED: NASAL SUPRATIP
LOCATION DETAILED: MIDDLE STERNUM
LOCATION DETAILED: RIGHT ANTERIOR PROXIMAL THIGH
LOCATION DETAILED: LEFT ANTERIOR DISTAL UPPER ARM
LOCATION DETAILED: LOWER STERNUM
LOCATION DETAILED: LEFT MEDIAL UPPER BACK
LOCATION DETAILED: RIGHT DISTAL POSTERIOR UPPER ARM
LOCATION DETAILED: LEFT DISTAL POSTERIOR UPPER ARM
LOCATION DETAILED: SUPERIOR THORACIC SPINE
LOCATION DETAILED: RIGHT DORSAL FOOT
LOCATION DETAILED: LEFT MEDIAL FRONTAL SCALP
LOCATION DETAILED: RIGHT DISTAL PRETIBIAL REGION
LOCATION DETAILED: RIGHT INFERIOR CENTRAL MALAR CHEEK
LOCATION DETAILED: SUPERIOR MID FOREHEAD
LOCATION DETAILED: LEFT INFERIOR MEDIAL FOREHEAD
LOCATION DETAILED: EPIGASTRIC SKIN
LOCATION DETAILED: LEFT DORSAL FOOT
LOCATION DETAILED: INFERIOR THORACIC SPINE
LOCATION DETAILED: NASAL TIP
LOCATION DETAILED: LEFT SUPERIOR MEDIAL UPPER BACK

## 2020-06-03 ASSESSMENT — LOCATION ZONE DERM
LOCATION ZONE: TRUNK
LOCATION ZONE: NOSE
LOCATION ZONE: SCALP
LOCATION ZONE: FEET
LOCATION ZONE: FACE
LOCATION ZONE: ARM
LOCATION ZONE: LEG

## 2020-06-03 NOTE — PROCEDURE: ADDITIONAL NOTES
Detail Level: Detailed
Additional Notes: If spot comes back by next appointment will take a biopsy on the scalp.
Additional Notes: Patient has had work up for SLE in the past which was negative.

## 2020-07-07 ENCOUNTER — OFFICE VISIT (OUTPATIENT)
Dept: NEUROLOGY | Facility: MEDICAL CENTER | Age: 45
End: 2020-07-07
Payer: COMMERCIAL

## 2020-07-07 VITALS
BODY MASS INDEX: 40.6 KG/M2 | HEIGHT: 68 IN | OXYGEN SATURATION: 98 % | WEIGHT: 267.86 LBS | SYSTOLIC BLOOD PRESSURE: 126 MMHG | HEART RATE: 99 BPM | TEMPERATURE: 98.6 F | DIASTOLIC BLOOD PRESSURE: 82 MMHG | RESPIRATION RATE: 16 BRPM

## 2020-07-07 PROCEDURE — 64615 CHEMODENERV MUSC MIGRAINE: CPT | Performed by: NURSE PRACTITIONER

## 2020-07-07 ASSESSMENT — FIBROSIS 4 INDEX: FIB4 SCORE: 1.61

## 2020-07-07 NOTE — PROGRESS NOTES
"Subjective:      Reta Lam is a 45 y.o. female who presents with Botox Injection (Chronic migraine )            HPI   Here for Botox today. Migraines are greatly improved.    Successfully weaned the nortriptyline to 20mg qhs at this time.  Still experiencing dry mouth.    Current Outpatient Medications   Medication Sig Dispense Refill   • nortriptyline (PAMELOR) 10 MG Cap Take 5 Caps by mouth every bedtime. Taper per physician instructions. (Patient taking differently: Take 20 mg by mouth every bedtime. Taper per physician instructions.) 120 Cap 2   • propranolol LA (INDERAL LA) 60 MG CAPSULE SR 24 HR Take 1 Cap by mouth every day. 90 Cap 1   • losartan (COZAAR) 50 MG Tab Take 1 Tab by mouth every day. 90 Tab 3   • omeprazole (PRILOSEC) 20 MG delayed-release capsule Take 1 Cap by mouth every evening. 90 Cap 3   • Cyanocobalamin (VITAMIN B-12) 1000 MCG Tab Take 1,000 mcg by mouth every day.     • cetirizine (ZYRTEC) 10 MG Tab Take 10 mg by mouth every bedtime.     • OnabotulinumtoxinA (BOTOX INJ) 1 Each by Injection route Every 3 Months.     • ibuprofen (MOTRIN) 200 MG Tab Take 400 mg by mouth every 6 hours as needed for Mild Pain.     • Multiple Vitamin (MULTI-VITAMIN PO) Take 1 Tab by mouth every bedtime.     • Levonorgestrel (MIRENA, 52 MG, IU) 1 Each by Intrauterine route See Admin Instructions. Every 5 years  Indications: placed 2016     • Cholecalciferol (VITAMIN D3) 5000 UNITS Cap Take 5,000 Units by mouth every bedtime.       Current Facility-Administered Medications   Medication Dose Route Frequency Provider Last Rate Last Dose   • onabotulinum toxin type A SOLR 155 Units  155 Units Injection Once PATSY Cervantes       Objective:     /82 (BP Location: Right arm, Patient Position: Sitting, BP Cuff Size: Adult long)   Pulse 99   Temp 37 °C (98.6 °F) (Temporal)   Resp 16   Ht 1.728 m (5' 8.02\")   Wt 121.5 kg (267 lb 13.7 oz)   SpO2 98%   BMI 40.70 kg/m²  "     Physical Exam            Assessment/Plan:       Chronic Migraine:  Botox therapy has reduced patient’s migraines by more than 7 days and/or 100 hours per month.  Additionally pt has noted a reduction in the amount of medication they are taking to treat their migraines and/or they are having a reduction in intractable migraine/status migrainosis which can result in urgent care or ER visits.     Documentation of patient's symptoms with migraine are included in the initial note with this patient including the following:  Education/counseling/reduction in medications if pt has medication overuse headache;  Frequency of headaches is >15 days monthly with at least 8 migraines monthly;  Migraines include at least two of the following: worsened with activity or avoidance of activity with migraines (ie they go lie down), moderate to severe pain intensity, pulsing headache, unilateral headache AND they have either nausea or vomiting OR sensitivity to light and sound     Although this patient is responding to botox they are NOT migraine free, however, and it is my recommendation botox be continued at this time     I treated this patient in clinic today with BotoxA 155 units according to the dosing/injection paradigm currently mandated by the FDA for the management of chronic migraine. Specifically, I injected 5 units to the procerus, 5 units to the corrugators bilaterally, a total of 20 units to the frontalis musculature, 20 units to the temporalis bilaterally, 15 units to the occipitalis bilaterally, 10 units to the cervical paraspinals bilaterally and 15 units to the trapezius musculature bilaterally. The remainder of the Botox 200 units mixed but not administered was discarded as wastage per FDA guidelines.     Nortriptyline to be weaned and stopped in the next 2 months.   Future goal of tapering of Inderal LA.  Continue Inderal LA 60mg qday.     Return for follow-up in 12 weeks for serial set of Botox.

## 2020-09-08 RX ORDER — NORTRIPTYLINE HYDROCHLORIDE 10 MG/1
30 CAPSULE ORAL
Qty: 90 CAP | Refills: 5 | Status: SHIPPED | OUTPATIENT
Start: 2020-09-08 | End: 2020-09-29 | Stop reason: SDUPTHER

## 2020-09-08 RX ORDER — PROPRANOLOL HCL 60 MG
60 CAPSULE, EXTENDED RELEASE 24HR ORAL DAILY
Qty: 90 CAP | Refills: 1 | Status: SHIPPED | OUTPATIENT
Start: 2020-09-08 | End: 2021-03-15 | Stop reason: SDUPTHER

## 2020-09-08 NOTE — TELEPHONE ENCOUNTER
"Received request via: Patient    Was the patient seen in the last year in this department? Yes    Does the patient have an active prescription (recently filled or refills available) for medication(s) requested? No     Patient was last seen on 07/07/2020 . Sent message via [facility] \"     Hi there!  I am in need of refills for the following medications:     Nortriptyline HCL 10 mg caps; I have been taking 3 a day at night.      Propranolol HCL ER 60 mg CP 24; I capsule per day     I use the [facility] on [facility].   Thank you!  Reta Lam  [phone]         "

## 2020-09-29 ENCOUNTER — OFFICE VISIT (OUTPATIENT)
Dept: NEUROLOGY | Facility: MEDICAL CENTER | Age: 45
End: 2020-09-29
Payer: COMMERCIAL

## 2020-09-29 VITALS
BODY MASS INDEX: 39.33 KG/M2 | HEART RATE: 91 BPM | WEIGHT: 259.48 LBS | TEMPERATURE: 97.2 F | SYSTOLIC BLOOD PRESSURE: 112 MMHG | DIASTOLIC BLOOD PRESSURE: 72 MMHG | OXYGEN SATURATION: 97 % | RESPIRATION RATE: 17 BRPM | HEIGHT: 68 IN

## 2020-09-29 DIAGNOSIS — R68.2 DRY MOUTH: ICD-10-CM

## 2020-09-29 PROCEDURE — 64615 CHEMODENERV MUSC MIGRAINE: CPT | Performed by: NURSE PRACTITIONER

## 2020-09-29 RX ORDER — NORTRIPTYLINE HYDROCHLORIDE 25 MG/1
CAPSULE ORAL
Qty: 30 CAP | Refills: 0 | Status: SHIPPED | OUTPATIENT
Start: 2020-09-29 | End: 2021-03-15 | Stop reason: SDUPTHER

## 2020-09-29 ASSESSMENT — FIBROSIS 4 INDEX: FIB4 SCORE: 1.61

## 2020-09-29 NOTE — PROGRESS NOTES
"Subjective:      Reta Lam is a 45 y.o. female who presents with Follow-Up (botox )            HPI Here for Botox today. Migraines are greatly improved.     Weaned nortriptyline to 20mg qhs but developed bad headaches.  Increased back to 30mg qhs and dry mouth returned but headaches were much less frequent.   Still experiencing dry mouth.     Current Outpatient Medications   Medication Sig Dispense Refill   • nortriptyline (PAMELOR) 10 MG Cap Take 3 Caps by mouth every bedtime. Taper per physician instructions. 90 Cap 5   • propranolol LA (INDERAL LA) 60 MG CAPSULE SR 24 HR Take 1 Cap by mouth every day. 90 Cap 1   • losartan (COZAAR) 50 MG Tab Take 1 Tab by mouth every day. 90 Tab 3   • omeprazole (PRILOSEC) 20 MG delayed-release capsule Take 1 Cap by mouth every evening. 90 Cap 3   • Cyanocobalamin (VITAMIN B-12) 1000 MCG Tab Take 1,000 mcg by mouth every day.     • cetirizine (ZYRTEC) 10 MG Tab Take 10 mg by mouth every bedtime.     • OnabotulinumtoxinA (BOTOX INJ) 1 Each by Injection route Every 3 Months.     • ibuprofen (MOTRIN) 200 MG Tab Take 400 mg by mouth every 6 hours as needed for Mild Pain.     • Multiple Vitamin (MULTI-VITAMIN PO) Take 1 Tab by mouth every bedtime.     • Levonorgestrel (MIRENA, 52 MG, IU) 1 Each by Intrauterine route See Admin Instructions. Every 5 years  Indications: placed 2016     • Cholecalciferol (VITAMIN D3) 5000 UNITS Cap Take 5,000 Units by mouth every bedtime.       Current Facility-Administered Medications   Medication Dose Route Frequency Provider Last Rate Last Dose   • onabotulinum toxin type A SOLR 155 Units  155 Units Injection Once PATSY Cervantes       Objective:     /72 (BP Location: Right arm)   Pulse 91   Temp 36.2 °C (97.2 °F)   Resp 17   Ht 1.727 m (5' 8\")   Wt 117.7 kg (259 lb 7.7 oz)   SpO2 97%   BMI 39.45 kg/m²      Physical Exam            Assessment/Plan:       Chronic Migraine:  Botox therapy has reduced " patient’s migraines by more than 7 days and/or 100 hours per month.  Additionally pt has noted a reduction in the amount of medication they are taking to treat their migraines and/or they are having a reduction in intractable migraine/status migrainosis which can result in urgent care or ER visits.     Documentation of patient's symptoms with migraine are included in the initial note with this patient including the following:  Education/counseling/reduction in medications if pt has medication overuse headache;  Frequency of headaches is >15 days monthly with at least 8 migraines monthly;  Migraines include at least two of the following: worsened with activity or avoidance of activity with migraines (ie they go lie down), moderate to severe pain intensity, pulsing headache, unilateral headache AND they have either nausea or vomiting OR sensitivity to light and sound     Although this patient is responding to botox they are NOT migraine free, however, and it is my recommendation botox be continued at this time     I treated this patient in clinic today with BotoxA 155 units according to the dosing/injection paradigm currently mandated by the FDA for the management of chronic migraine. Specifically, I injected 5 units to the procerus, 5 units to the corrugators bilaterally, a total of 20 units to the frontalis musculature, 20 units to the temporalis bilaterally, 15 units to the occipitalis bilaterally, 10 units to the cervical paraspinals bilaterally and 15 units to the trapezius musculature bilaterally. The remainder of the Botox 200 units mixed but not administered was discarded as wastage per FDA guidelines.     She is interested in trial of CGRP inhibitor monthly injection.  Also, will consider using nortriptyline 25mg qhs-- new Rx provided for such.    Return for follow-up in 12 weeks for serial set of Botox.

## 2020-10-10 ENCOUNTER — HOSPITAL ENCOUNTER (OUTPATIENT)
Dept: RADIOLOGY | Facility: MEDICAL CENTER | Age: 45
End: 2020-10-10
Attending: FAMILY MEDICINE
Payer: COMMERCIAL

## 2020-10-10 DIAGNOSIS — Z12.31 VISIT FOR SCREENING MAMMOGRAM: ICD-10-CM

## 2020-10-10 PROCEDURE — 77067 SCR MAMMO BI INCL CAD: CPT

## 2020-10-22 ENCOUNTER — NON-PROVIDER VISIT (OUTPATIENT)
Dept: INTERNAL MEDICINE | Facility: IMAGING CENTER | Age: 45
End: 2020-10-22
Payer: COMMERCIAL

## 2020-10-22 DIAGNOSIS — Z23 NEED FOR INFLUENZA VACCINATION: ICD-10-CM

## 2020-10-22 PROCEDURE — 90686 IIV4 VACC NO PRSV 0.5 ML IM: CPT | Performed by: FAMILY MEDICINE

## 2020-10-22 PROCEDURE — 90471 IMMUNIZATION ADMIN: CPT | Performed by: FAMILY MEDICINE

## 2020-11-24 DIAGNOSIS — G43.709 CHRONIC MIGRAINE W/O AURA W/O STATUS MIGRAINOSUS, NOT INTRACTABLE: ICD-10-CM

## 2020-12-01 DIAGNOSIS — K52.831 COLLAGENOUS COLITIS: ICD-10-CM

## 2020-12-01 RX ORDER — BUDESONIDE 3 MG/1
9 CAPSULE, COATED PELLETS ORAL EVERY MORNING
Qty: 90 CAP | Refills: 2 | Status: SHIPPED
Start: 2020-12-01 | End: 2021-06-22

## 2020-12-02 ENCOUNTER — APPOINTMENT (RX ONLY)
Dept: URBAN - METROPOLITAN AREA CLINIC 4 | Facility: CLINIC | Age: 45
Setting detail: DERMATOLOGY
End: 2020-12-02

## 2020-12-02 DIAGNOSIS — L57.0 ACTINIC KERATOSIS: ICD-10-CM

## 2020-12-02 DIAGNOSIS — Z71.89 OTHER SPECIFIED COUNSELING: ICD-10-CM

## 2020-12-02 PROCEDURE — ? COUNSELING

## 2020-12-02 PROCEDURE — 17003 DESTRUCT PREMALG LES 2-14: CPT

## 2020-12-02 PROCEDURE — ? LIQUID NITROGEN

## 2020-12-02 PROCEDURE — 17000 DESTRUCT PREMALG LESION: CPT

## 2020-12-02 ASSESSMENT — LOCATION DETAILED DESCRIPTION DERM
LOCATION DETAILED: NASAL DORSUM
LOCATION DETAILED: NASAL TIP
LOCATION DETAILED: SUPERIOR MID FOREHEAD

## 2020-12-02 ASSESSMENT — LOCATION SIMPLE DESCRIPTION DERM
LOCATION SIMPLE: SUPERIOR FOREHEAD
LOCATION SIMPLE: NOSE

## 2020-12-02 ASSESSMENT — LOCATION ZONE DERM
LOCATION ZONE: NOSE
LOCATION ZONE: FACE

## 2020-12-16 ENCOUNTER — HOSPITAL ENCOUNTER (OUTPATIENT)
Dept: LAB | Facility: MEDICAL CENTER | Age: 45
End: 2020-12-16
Attending: FAMILY MEDICINE
Payer: COMMERCIAL

## 2020-12-16 ENCOUNTER — PATIENT MESSAGE (OUTPATIENT)
Dept: INTERNAL MEDICINE | Facility: IMAGING CENTER | Age: 45
End: 2020-12-16

## 2020-12-16 DIAGNOSIS — Z20.822 ENCOUNTER FOR LABORATORY TESTING FOR COVID-19 VIRUS: ICD-10-CM

## 2020-12-16 LAB — COVID ORDER STATUS COVID19: NORMAL

## 2020-12-16 PROCEDURE — U0003 INFECTIOUS AGENT DETECTION BY NUCLEIC ACID (DNA OR RNA); SEVERE ACUTE RESPIRATORY SYNDROME CORONAVIRUS 2 (SARS-COV-2) (CORONAVIRUS DISEASE [COVID-19]), AMPLIFIED PROBE TECHNIQUE, MAKING USE OF HIGH THROUGHPUT TECHNOLOGIES AS DESCRIBED BY CMS-2020-01-R: HCPCS

## 2020-12-16 PROCEDURE — C9803 HOPD COVID-19 SPEC COLLECT: HCPCS

## 2020-12-18 DIAGNOSIS — G43.709 CHRONIC MIGRAINE W/O AURA W/O STATUS MIGRAINOSUS, NOT INTRACTABLE: ICD-10-CM

## 2020-12-18 DIAGNOSIS — J01.81 OTHER ACUTE RECURRENT SINUSITIS: ICD-10-CM

## 2020-12-18 LAB
SARS-COV-2 RNA RESP QL NAA+PROBE: DETECTED
SPECIMEN SOURCE: ABNORMAL

## 2020-12-18 RX ORDER — AZITHROMYCIN 250 MG/1
TABLET, FILM COATED ORAL
Qty: 6 TAB | Refills: 0 | Status: SHIPPED | OUTPATIENT
Start: 2020-12-18 | End: 2021-01-05

## 2020-12-29 DIAGNOSIS — G43.709 CHRONIC MIGRAINE W/O AURA W/O STATUS MIGRAINOSUS, NOT INTRACTABLE: ICD-10-CM

## 2021-01-05 ENCOUNTER — OFFICE VISIT (OUTPATIENT)
Dept: NEUROLOGY | Facility: MEDICAL CENTER | Age: 46
End: 2021-01-05
Attending: NURSE PRACTITIONER
Payer: COMMERCIAL

## 2021-01-05 VITALS
WEIGHT: 258.6 LBS | BODY MASS INDEX: 39.19 KG/M2 | TEMPERATURE: 98.6 F | RESPIRATION RATE: 16 BRPM | SYSTOLIC BLOOD PRESSURE: 122 MMHG | HEART RATE: 74 BPM | OXYGEN SATURATION: 98 % | HEIGHT: 68 IN | DIASTOLIC BLOOD PRESSURE: 74 MMHG

## 2021-01-05 PROCEDURE — 64615 CHEMODENERV MUSC MIGRAINE: CPT | Performed by: NURSE PRACTITIONER

## 2021-01-05 ASSESSMENT — PATIENT HEALTH QUESTIONNAIRE - PHQ9: CLINICAL INTERPRETATION OF PHQ2 SCORE: 0

## 2021-01-05 ASSESSMENT — FIBROSIS 4 INDEX: FIB4 SCORE: 1.61

## 2021-01-05 NOTE — PROGRESS NOTES
"Subjective:      Reta Lam is a 45 y.o. female who presents with Botox Injection (Chronic migraine)            HPI  2-3 weeks overdue for Botox today.    Bad head cold and tested positive for COVID-19 in December 2020.    Current Outpatient Medications   Medication Sig Dispense Refill   • budesonide (ENTOCORT EC) 3 MG Cap DR Particles capsule Take 3 Caps by mouth every morning. 90 Cap 2   • nortriptyline (PAMELOR) 25 MG Cap Take one tablet po qhs. 30 Cap 0   • propranolol LA (INDERAL LA) 60 MG CAPSULE SR 24 HR Take 1 Cap by mouth every day. 90 Cap 1   • losartan (COZAAR) 50 MG Tab Take 1 Tab by mouth every day. 90 Tab 3   • omeprazole (PRILOSEC) 20 MG delayed-release capsule Take 1 Cap by mouth every evening. 90 Cap 3   • Cyanocobalamin (VITAMIN B-12) 1000 MCG Tab Take 1,000 mcg by mouth every day.     • cetirizine (ZYRTEC) 10 MG Tab Take 10 mg by mouth every bedtime.     • OnabotulinumtoxinA (BOTOX INJ) 1 Each by Injection route Every 3 Months.     • ibuprofen (MOTRIN) 200 MG Tab Take 400 mg by mouth every 6 hours as needed for Mild Pain.     • Multiple Vitamin (MULTI-VITAMIN PO) Take 1 Tab by mouth every bedtime.     • Levonorgestrel (MIRENA, 52 MG, IU) 1 Each by Intrauterine route See Admin Instructions. Every 5 years  Indications: placed 2016     • Cholecalciferol (VITAMIN D3) 5000 UNITS Cap Take 5,000 Units by mouth every bedtime.       No current facility-administered medications for this visit.          ROS       Objective:     /74 (BP Location: Left arm, Patient Position: Sitting, BP Cuff Size: Adult)   Pulse 74   Temp 37 °C (98.6 °F) (Temporal)   Resp 16   Ht 1.727 m (5' 7.99\")   Wt 117.3 kg (258 lb 9.6 oz)   SpO2 98%   BMI 39.33 kg/m²      Physical Exam            Assessment/Plan:        Chronic Migraine:  Botox therapy has reduced patient’s migraines by more than 7 days and/or 100 hours per month.  Additionally pt has noted a reduction in the amount of medication they are taking " to treat their migraines and/or they are having a reduction in intractable migraine/status migrainosis which can result in urgent care or ER visits.     Documentation of patient's symptoms with migraine are included in the initial note with this patient including the following:  Education/counseling/reduction in medications if pt has medication overuse headache;  Frequency of headaches is >15 days monthly with at least 8 migraines monthly;  Migraines include at least two of the following: worsened with activity or avoidance of activity with migraines (ie they go lie down), moderate to severe pain intensity, pulsing headache, unilateral headache AND they have either nausea or vomiting OR sensitivity to light and sound     Although this patient is responding to botox they are NOT migraine free, however, and it is my recommendation botox be continued at this time     I treated this patient in clinic today with BotoxA 155 units according to the dosing/injection paradigm currently mandated by the FDA for the management of chronic migraine. Specifically, I injected 5 units to the procerus, 5 units to the corrugators bilaterally, a total of 20 units to the frontalis musculature, 20 units to the temporalis bilaterally, 15 units to the occipitalis bilaterally, 10 units to the cervical paraspinals bilaterally and 15 units to the trapezius musculature bilaterally. The remainder of the Botox 200 units mixed but not administered was discarded as wastage per FDA guidelines.     Return for follow-up in 12 weeks for serial set of Botox.

## 2021-02-07 DIAGNOSIS — K21.9 GASTROESOPHAGEAL REFLUX DISEASE WITHOUT ESOPHAGITIS: ICD-10-CM

## 2021-02-07 DIAGNOSIS — R23.4 ROUGH SKIN: ICD-10-CM

## 2021-02-07 RX ORDER — OMEPRAZOLE 20 MG/1
20 CAPSULE, DELAYED RELEASE ORAL EVERY EVENING
Qty: 90 CAP | Refills: 3 | Status: SHIPPED | OUTPATIENT
Start: 2021-02-07 | End: 2021-12-29 | Stop reason: SDUPTHER

## 2021-02-07 RX ORDER — UREA 40 %
1 CREAM (GRAM) TOPICAL 2 TIMES DAILY PRN
Qty: 120 G | Refills: 3 | Status: SHIPPED | OUTPATIENT
Start: 2021-02-07 | End: 2021-09-14

## 2021-03-06 DIAGNOSIS — I10 ESSENTIAL HYPERTENSION: ICD-10-CM

## 2021-03-06 RX ORDER — LOSARTAN POTASSIUM 50 MG/1
50 TABLET ORAL DAILY
Qty: 90 TABLET | Refills: 3 | Status: SHIPPED | OUTPATIENT
Start: 2021-03-06 | End: 2022-03-03

## 2021-03-15 RX ORDER — PROPRANOLOL HCL 60 MG
60 CAPSULE, EXTENDED RELEASE 24HR ORAL DAILY
Qty: 90 CAPSULE | Refills: 1 | Status: SHIPPED | OUTPATIENT
Start: 2021-03-15 | End: 2021-09-14 | Stop reason: SDUPTHER

## 2021-03-15 RX ORDER — NORTRIPTYLINE HYDROCHLORIDE 10 MG/1
30 CAPSULE ORAL DAILY
Qty: 270 CAPSULE | Refills: 1 | Status: SHIPPED | OUTPATIENT
Start: 2021-03-15 | End: 2021-09-14 | Stop reason: SDUPTHER

## 2021-03-15 NOTE — TELEPHONE ENCOUNTER
"MyChart from patient:    \"Hi there!  I am hoping you can call in some refills to the [facility] on [facility]!     Please refill:  Nortriptyline HCL 10 mg. Take 3 capsules one time per day      Propranolol HCL ER 60mg. Take one capsule one time per day      Thank you!!  Reta\"      Received request via: Pharmacy    Was the patient seen in the last year in this department? Yes    Does the patient have an active prescription (recently filled or refills available) for medication(s) requested? No       Next appt 03/30/2021   "

## 2021-03-30 ENCOUNTER — OFFICE VISIT (OUTPATIENT)
Dept: NEUROLOGY | Facility: MEDICAL CENTER | Age: 46
End: 2021-03-30
Attending: NURSE PRACTITIONER
Payer: COMMERCIAL

## 2021-03-30 VITALS
TEMPERATURE: 98.4 F | HEIGHT: 68 IN | BODY MASS INDEX: 39.09 KG/M2 | DIASTOLIC BLOOD PRESSURE: 78 MMHG | WEIGHT: 257.94 LBS | OXYGEN SATURATION: 96 % | HEART RATE: 92 BPM | RESPIRATION RATE: 16 BRPM | SYSTOLIC BLOOD PRESSURE: 112 MMHG

## 2021-03-30 DIAGNOSIS — G43.709 CHRONIC MIGRAINE W/O AURA W/O STATUS MIGRAINOSUS, NOT INTRACTABLE: ICD-10-CM

## 2021-03-30 PROCEDURE — 64615 CHEMODENERV MUSC MIGRAINE: CPT | Performed by: NURSE PRACTITIONER

## 2021-03-30 RX ORDER — ERENUMAB-AOOE 70 MG/ML
70 INJECTION SUBCUTANEOUS
Qty: 1 ML | Refills: 12 | Status: SHIPPED
Start: 2021-03-30 | End: 2021-06-03 | Stop reason: CLARIF

## 2021-03-30 RX ORDER — LEVOCETIRIZINE DIHYDROCHLORIDE 5 MG/1
TABLET, FILM COATED ORAL
COMMUNITY

## 2021-03-30 ASSESSMENT — FIBROSIS 4 INDEX: FIB4 SCORE: 1.61

## 2021-03-30 NOTE — PROGRESS NOTES
Subjective:      Reta Lam is a 45 y.o. female who presents for Botox for Chronic migraine.     She is a patient of [facility]  I am seeing her today for Botox.     She had COVID in 12/2020    Her last Botox was 1/5/2021.  She has been getting Botox for years.  Prior to Botox she had daily migraines, she now has 3-5 migraines per month.  She is interested in trying to a monthly CGRP to help get rid of the migraines.       Current Outpatient Medications on File Prior to Visit   Medication Sig Dispense Refill   • Levocetirizine Dihydrochloride (XYZAL ALLERGY 24HR) 5 MG Tab Take  by mouth.     • propranolol LA (INDERAL LA) 60 MG CAPSULE SR 24 HR Take 1 capsule by mouth every day. 90 capsule 1   • nortriptyline (PAMELOR) 10 MG Cap Take 3 Capsules by mouth every day. 270 capsule 1   • losartan (COZAAR) 50 MG Tab Take 1 tablet by mouth every day. 90 tablet 3   • omeprazole (PRILOSEC) 20 MG delayed-release capsule Take 1 Cap by mouth every evening. 90 Cap 3   • urea 40 % Cream Apply 1 Application topically 2 times a day as needed. 120 g 3   • budesonide (ENTOCORT EC) 3 MG Cap DR Particles capsule Take 3 Caps by mouth every morning. 90 Cap 2   • Cyanocobalamin (VITAMIN B-12) 1000 MCG Tab Take 1,000 mcg by mouth every day.     • OnabotulinumtoxinA (BOTOX INJ) 1 Each by Injection route Every 3 Months.     • ibuprofen (MOTRIN) 200 MG Tab Take 400 mg by mouth every 6 hours as needed for Mild Pain.     • Multiple Vitamin (MULTI-VITAMIN PO) Take 1 Tab by mouth every bedtime.     • Levonorgestrel (MIRENA, 52 MG, IU) 1 Each by Intrauterine route See Admin Instructions. Every 5 years  Indications: placed 2016     • Cholecalciferol (VITAMIN D3) 5000 UNITS Cap Take 5,000 Units by mouth every bedtime.       No current facility-administered medications on file prior to visit.        Objective:     /78 (BP Location: Right arm, Patient Position: Sitting, BP Cuff Size: Adult)   Pulse 92   Temp 36.9 °C (98.4 °F)  "(Temporal)   Resp 16   Ht 1.727 m (5' 7.99\")   Wt 117 kg (257 lb 15 oz)   SpO2 96%   BMI 39.23 kg/m²           Assessment/Plan:     1. Chronic migraine w/o aura w/o status migrainosus, not intractable    - onabotulinum toxin A (Botox) injection 155 Units      Chronic Migraine:  Botox therapy has reduced patient’s migraines by more than 7 days and/or 100 hours per month.     I treated her  in clinic today with BotoxA 155 units according to the dosing/injection paradigm currently mandated by the FDA for the management of chronic migraine. Specifically, I injected 5 units to the procerus, 5 units to the corrugators bilaterally, a total of 20 units to the frontalis musculature, 20 units to the temporalis bilaterally, 15 units to the occipitalis bilaterally, 10 units to the cervical paraspinals bilaterally and 15 units to the trapezius musculature bilaterally. The remainder of the Botox 200 units mixed but not administered was discarded as wastage per FDA guidelines.      Frequency of headaches is >15 days monthly with at least 8 migraines monthly; Migraines include at least two of the following: worsened with activity or avoidance of activity with migraines (ie they go lie down), moderate to severe pain intensity, pulsing headache, unilateral headache AND has either nausea or vomiting OR sensitivity to light and sound.     Although she is responding to botox she is NOT  migraine free, however, and it is my recommendation Botox be continued at this time.     She  has chronic daily migraines, defined as having 15 or more headaches days per month, 8 of which are migraines, over a minimum of the last three months. Episodes last more than 4 hours (untreated).  Pt has 2 or more of following (see initial note) -  Headache worsened with activity, pain is moderate to severe intensity, pulsing in nature, unilateral and patient either has nausea/vomiting OR sensitivity to light and sound.  she  does not also have medication " overuse headache.       She still has some breakthrough migraines and is not tolerating nortriptyline due to dry mouth.  We will try to get Aimovig 70 mg SC every 4 weeks approved and then consider weaning nortriptyline.    Follow up 6/22/2021 with Rufina for Botox.

## 2021-03-30 NOTE — PATIENT INSTRUCTIONS
OnabotulinumtoxinA injection (Medical Use)  What is this medicine?  ONABOTULINUMTOXINA (o na JACOB you lye num tox in ) is a neuro-muscular blocker. This medicine is used to treat crossed eyes, eyelid spasms, severe neck muscle spasms, ankle and toe muscle spasms, and elbow, wrist, and finger muscle spasms. It is also used to treat excessive underarm sweating, to prevent chronic migraine headaches, and to treat loss of bladder control due to neurologic conditions such as multiple sclerosis or spinal cord injury.  This medicine may be used for other purposes; ask your health care provider or pharmacist if you have questions.  COMMON BRAND NAME(S): Botox  What should I tell my health care provider before I take this medicine?  They need to know if you have any of these conditions:  · breathing problems  · cerebral palsy spasms  · difficulty urinating  · heart problems  · history of surgery where this medicine is going to be used  · infection at the site where this medicine is going to be used  · myasthenia gravis or other neurologic disease  · nerve or muscle disease  · surgery plans  · take medicines that treat or prevent blood clots  · thyroid problems  · an unusual or allergic reaction to botulinum toxin, albumin, other medicines, foods, dyes, or preservatives  · pregnant or trying to get pregnant  · breast-feeding  How should I use this medicine?  This medicine is for injection into a muscle. It is given by a health care professional in a hospital or clinic setting.  Talk to your pediatrician regarding the use of this medicine in children. While this drug may be prescribed for children as young as 2 years old for selected conditions, precautions do apply.  Overdosage: If you think you have taken too much of this medicine contact a poison control center or emergency room at once.  NOTE: This medicine is only for you. Do not share this medicine with others.  What if I miss a dose?  This does not apply.  What may  interact with this medicine?  · aminoglycoside antibiotics like gentamicin, neomycin, tobramycin  · muscle relaxants  · other botulinum toxin injections  This list may not describe all possible interactions. Give your health care provider a list of all the medicines, herbs, non-prescription drugs, or dietary supplements you use. Also tell them if you smoke, drink alcohol, or use illegal drugs. Some items may interact with your medicine.  What should I watch for while using this medicine?  Visit your doctor for regular check ups.  This medicine will cause weakness in the muscle where it is injected. Tell your doctor if you feel unusually weak in other muscles. Get medical help right away if you have problems with breathing, swallowing, or talking.  This medicine might make your eyelids droop or make you see blurry or double. If you have weak muscles or trouble seeing do not drive a car, use machinery, or do other dangerous activities.  This medicine contains albumin from human blood. It may be possible to pass an infection in this medicine, but no cases have been reported. Talk to your doctor about the risks and benefits of this medicine.  If your activities have been limited by your condition, go back to your regular routine slowly after treatment with this medicine.  What side effects may I notice from receiving this medicine?  Side effects that you should report to your doctor or health care professional as soon as possible:  · allergic reactions like skin rash, itching or hives, swelling of the face, lips, or tongue  · breathing problems  · changes in vision  · chest pain or tightness  · eye irritation, pain  · fast, irregular heartbeat  · infection  · numbness  · speech problems  · swallowing problems  · unusual weakness  Side effects that usually do not require medical attention (report to your doctor or health care professional if they continue or are bothersome):  · bruising or pain at site where  injected  · drooping eyelid  · dry eyes or mouth  · headache  · muscles aches, pains  · sensitivity to light  · tearing  This list may not describe all possible side effects. Call your doctor for medical advice about side effects. You may report side effects to FDA at 6-389-KQL-8783.  Where should I keep my medicine?  This drug is given in a hospital or clinic and will not be stored at home.  NOTE: This sheet is a summary. It may not cover all possible information. If you have questions about this medicine, talk to your doctor, pharmacist, or health care provider.  © 2020 [employer]vier/Gold Standard (2019-06-24 14:21:42)

## 2021-06-02 ENCOUNTER — APPOINTMENT (RX ONLY)
Dept: URBAN - METROPOLITAN AREA CLINIC 4 | Facility: CLINIC | Age: 46
Setting detail: DERMATOLOGY
End: 2021-06-02

## 2021-06-02 DIAGNOSIS — R23.1 PALLOR: ICD-10-CM

## 2021-06-02 DIAGNOSIS — D22 MELANOCYTIC NEVI: ICD-10-CM

## 2021-06-02 DIAGNOSIS — L81.4 OTHER MELANIN HYPERPIGMENTATION: ICD-10-CM

## 2021-06-02 DIAGNOSIS — L57.0 ACTINIC KERATOSIS: ICD-10-CM | Status: RESOLVED

## 2021-06-02 DIAGNOSIS — Z71.89 OTHER SPECIFIED COUNSELING: ICD-10-CM

## 2021-06-02 DIAGNOSIS — D18.0 HEMANGIOMA: ICD-10-CM

## 2021-06-02 DIAGNOSIS — F42.4 EXCORIATION (SKIN-PICKING) DISORDER: ICD-10-CM

## 2021-06-02 DIAGNOSIS — L82.1 OTHER SEBORRHEIC KERATOSIS: ICD-10-CM

## 2021-06-02 PROBLEM — D23.62 OTHER BENIGN NEOPLASM OF SKIN OF LEFT UPPER LIMB, INCLUDING SHOULDER: Status: ACTIVE | Noted: 2021-06-02

## 2021-06-02 PROBLEM — D22.72 MELANOCYTIC NEVI OF LEFT LOWER LIMB, INCLUDING HIP: Status: ACTIVE | Noted: 2021-06-02

## 2021-06-02 PROBLEM — D22.62 MELANOCYTIC NEVI OF LEFT UPPER LIMB, INCLUDING SHOULDER: Status: ACTIVE | Noted: 2021-06-02

## 2021-06-02 PROBLEM — S00.80XA UNSPECIFIED SUPERFICIAL INJURY OF OTHER PART OF HEAD, INITIAL ENCOUNTER: Status: ACTIVE | Noted: 2021-06-02

## 2021-06-02 PROBLEM — D22.61 MELANOCYTIC NEVI OF RIGHT UPPER LIMB, INCLUDING SHOULDER: Status: ACTIVE | Noted: 2021-06-02

## 2021-06-02 PROBLEM — D18.01 HEMANGIOMA OF SKIN AND SUBCUTANEOUS TISSUE: Status: ACTIVE | Noted: 2021-06-02

## 2021-06-02 PROBLEM — D22.5 MELANOCYTIC NEVI OF TRUNK: Status: ACTIVE | Noted: 2021-06-02

## 2021-06-02 PROBLEM — D22.39 MELANOCYTIC NEVI OF OTHER PARTS OF FACE: Status: ACTIVE | Noted: 2021-06-02

## 2021-06-02 PROBLEM — D22.71 MELANOCYTIC NEVI OF RIGHT LOWER LIMB, INCLUDING HIP: Status: ACTIVE | Noted: 2021-06-02

## 2021-06-02 PROCEDURE — 99213 OFFICE O/P EST LOW 20 MIN: CPT

## 2021-06-02 PROCEDURE — ? PHOTO-DOCUMENTATION

## 2021-06-02 PROCEDURE — ? COUNSELING

## 2021-06-02 PROCEDURE — ? ADDITIONAL NOTES

## 2021-06-02 PROCEDURE — ? SUNSCREEN TREATMENT REGIMEN

## 2021-06-02 ASSESSMENT — LOCATION DETAILED DESCRIPTION DERM
LOCATION DETAILED: MIDDLE STERNUM
LOCATION DETAILED: SUPERIOR THORACIC SPINE
LOCATION DETAILED: RIGHT SUPERIOR MEDIAL BUCCAL CHEEK
LOCATION DETAILED: LEFT SUPERIOR MEDIAL UPPER BACK
LOCATION DETAILED: RIGHT PROXIMAL POSTERIOR UPPER ARM
LOCATION DETAILED: RIGHT ANTERIOR DISTAL UPPER ARM
LOCATION DETAILED: INFERIOR THORACIC SPINE
LOCATION DETAILED: RIGHT DISTAL POSTERIOR UPPER ARM
LOCATION DETAILED: LEFT ANTERIOR PROXIMAL THIGH
LOCATION DETAILED: RIGHT DORSAL FOOT
LOCATION DETAILED: SUPERIOR MID FOREHEAD
LOCATION DETAILED: RIGHT DISTAL PRETIBIAL REGION
LOCATION DETAILED: LEFT INFERIOR MEDIAL FOREHEAD
LOCATION DETAILED: LEFT DISTAL POSTERIOR UPPER ARM
LOCATION DETAILED: RIGHT PROXIMAL PRETIBIAL REGION
LOCATION DETAILED: LEFT DISTAL PRETIBIAL REGION
LOCATION DETAILED: LOWER STERNUM
LOCATION DETAILED: LEFT DORSAL FOOT
LOCATION DETAILED: LEFT MEDIAL UPPER BACK
LOCATION DETAILED: LEFT PROXIMAL POSTERIOR UPPER ARM
LOCATION DETAILED: RIGHT INFERIOR CENTRAL MALAR CHEEK
LOCATION DETAILED: LEFT ANTERIOR DISTAL UPPER ARM
LOCATION DETAILED: RIGHT ANTERIOR PROXIMAL THIGH
LOCATION DETAILED: LEFT PROXIMAL PRETIBIAL REGION
LOCATION DETAILED: EPIGASTRIC SKIN

## 2021-06-02 ASSESSMENT — LOCATION ZONE DERM
LOCATION ZONE: TRUNK
LOCATION ZONE: FACE
LOCATION ZONE: FEET
LOCATION ZONE: ARM
LOCATION ZONE: LEG

## 2021-06-02 ASSESSMENT — LOCATION SIMPLE DESCRIPTION DERM
LOCATION SIMPLE: RIGHT POSTERIOR UPPER ARM
LOCATION SIMPLE: RIGHT FOOT
LOCATION SIMPLE: LEFT PRETIBIAL REGION
LOCATION SIMPLE: LEFT POSTERIOR UPPER ARM
LOCATION SIMPLE: LEFT FOREHEAD
LOCATION SIMPLE: LEFT FOOT
LOCATION SIMPLE: ABDOMEN
LOCATION SIMPLE: RIGHT THIGH
LOCATION SIMPLE: LEFT UPPER ARM
LOCATION SIMPLE: SUPERIOR FOREHEAD
LOCATION SIMPLE: RIGHT UPPER ARM
LOCATION SIMPLE: LEFT THIGH
LOCATION SIMPLE: CHEST
LOCATION SIMPLE: RIGHT PRETIBIAL REGION
LOCATION SIMPLE: UPPER BACK
LOCATION SIMPLE: LEFT UPPER BACK
LOCATION SIMPLE: RIGHT CHEEK

## 2021-06-02 NOTE — PROCEDURE: ADDITIONAL NOTES
Additional Notes: Patient presents with no active lesions upon examination.
Render Risk Assessment In Note?: no
Detail Level: Simple
Additional Notes: Patient has recently been tweezing and picking at the area to remove hair. Recommend she avoid doing this and to call if excoriation doesn’t resolve in two weeks. Will consider biopsy if lesion doesn't heal appropriately. Patient understands.
Additional Notes: Patient states PCP did full work up and she has seen vascular specialists. No known underline connective tissue disorders or known vasculitis. Patient states it has been present for many years without significant change. Evident today as the exam room was cold, which is an eliciting factor for the patient

## 2021-06-03 DIAGNOSIS — G43.709 CHRONIC MIGRAINE W/O AURA W/O STATUS MIGRAINOSUS, NOT INTRACTABLE: ICD-10-CM

## 2021-06-17 ENCOUNTER — TELEPHONE (OUTPATIENT)
Dept: NEUROLOGY | Facility: MEDICAL CENTER | Age: 46
End: 2021-06-17

## 2021-06-17 DIAGNOSIS — G43.709 CHRONIC MIGRAINE W/O AURA W/O STATUS MIGRAINOSUS, NOT INTRACTABLE: ICD-10-CM

## 2021-06-17 RX ORDER — ERENUMAB-AOOE 70 MG/ML
70 INJECTION SUBCUTANEOUS
Qty: 1 ML | Refills: 12 | Status: SHIPPED | OUTPATIENT
Start: 2021-06-17 | End: 2021-06-21 | Stop reason: SDUPTHER

## 2021-06-17 NOTE — TELEPHONE ENCOUNTER
Hello!    Smita JAMES has been denied since the ins prefers the Aimovig and Emgality. Go figure?? Did a peer to peer review and was able to get the Aimovig approved! Copay is 50.00. Please cancel the Ajovy and make a new order for Aimovig. I will contact pt to offer a copay card.    Thanks so much!!

## 2021-06-21 ENCOUNTER — TELEPHONE (OUTPATIENT)
Dept: NEUROLOGY | Facility: MEDICAL CENTER | Age: 46
End: 2021-06-21

## 2021-06-21 DIAGNOSIS — G43.709 CHRONIC MIGRAINE W/O AURA W/O STATUS MIGRAINOSUS, NOT INTRACTABLE: ICD-10-CM

## 2021-06-21 PROCEDURE — RXMED WILLOW AMBULATORY MEDICATION CHARGE: Performed by: NURSE PRACTITIONER

## 2021-06-21 RX ORDER — ERENUMAB-AOOE 70 MG/ML
70 INJECTION SUBCUTANEOUS
Qty: 1 ML | Refills: 12 | Status: SHIPPED | OUTPATIENT
Start: 2021-06-21 | End: 2021-09-14 | Stop reason: SDUPTHER

## 2021-06-22 ENCOUNTER — OFFICE VISIT (OUTPATIENT)
Dept: NEUROLOGY | Facility: MEDICAL CENTER | Age: 46
End: 2021-06-22
Attending: NURSE PRACTITIONER
Payer: COMMERCIAL

## 2021-06-22 ENCOUNTER — PHARMACY VISIT (OUTPATIENT)
Dept: PHARMACY | Facility: MEDICAL CENTER | Age: 46
End: 2021-06-22
Payer: COMMERCIAL

## 2021-06-22 VITALS
TEMPERATURE: 98.2 F | BODY MASS INDEX: 38.95 KG/M2 | DIASTOLIC BLOOD PRESSURE: 74 MMHG | WEIGHT: 257 LBS | RESPIRATION RATE: 20 BRPM | OXYGEN SATURATION: 97 % | HEART RATE: 86 BPM | SYSTOLIC BLOOD PRESSURE: 118 MMHG | HEIGHT: 68 IN

## 2021-06-22 PROCEDURE — 64615 CHEMODENERV MUSC MIGRAINE: CPT | Performed by: NURSE PRACTITIONER

## 2021-06-22 PROCEDURE — 700111 HCHG RX REV CODE 636 W/ 250 OVERRIDE (IP): Performed by: NURSE PRACTITIONER

## 2021-06-22 RX ADMIN — ONABOTULINUMTOXINA 155 UNITS: 200 INJECTION, POWDER, LYOPHILIZED, FOR SOLUTION INTRADERMAL; INTRAMUSCULAR at 12:05

## 2021-06-22 ASSESSMENT — FIBROSIS 4 INDEX: FIB4 SCORE: 1.61

## 2021-06-22 NOTE — PROGRESS NOTES
Subjective:      Reta Lam is a 45 y.o. female who presents with Botox Injection (Chronic migraine)            HPI    Here for Botox today.    Aimovig 70mg/ml recently approved and will be started soon.  Plans to start the monthly self-injections in 5-7 days from now so the CGRP and Botox treatments overlap.    Current Outpatient Medications   Medication Sig Dispense Refill   • Levocetirizine Dihydrochloride (XYZAL ALLERGY 24HR) 5 MG Tab Take  by mouth.     • propranolol LA (INDERAL LA) 60 MG CAPSULE SR 24 HR Take 1 capsule by mouth every day. 90 capsule 1   • nortriptyline (PAMELOR) 10 MG Cap Take 3 Capsules by mouth every day. 270 capsule 1   • losartan (COZAAR) 50 MG Tab Take 1 tablet by mouth every day. 90 tablet 3   • omeprazole (PRILOSEC) 20 MG delayed-release capsule Take 1 Cap by mouth every evening. 90 Cap 3   • urea 40 % Cream Apply 1 Application topically 2 times a day as needed. 120 g 3   • Cyanocobalamin (VITAMIN B-12) 1000 MCG Tab Take 1,000 mcg by mouth every day.     • OnabotulinumtoxinA (BOTOX INJ) 1 Each by Injection route Every 3 Months.     • ibuprofen (MOTRIN) 200 MG Tab Take 400 mg by mouth every 6 hours as needed for Mild Pain.     • Multiple Vitamin (MULTI-VITAMIN PO) Take 1 Tab by mouth every bedtime.     • Levonorgestrel (MIRENA, 52 MG, IU) 1 Each by Intrauterine route See Admin Instructions. Every 5 years  Indications: placed 2016     • Cholecalciferol (VITAMIN D3) 5000 UNITS Cap Take 5,000 Units by mouth every bedtime.     • Erenumab (AIMOVIG) 70 MG/ML Solution Auto-injector Inject 1 mL under the skin every 4 weeks. (Patient not taking: Reported on 6/22/2021) 1 mL 12   • budesonide (ENTOCORT EC) 3 MG Cap DR Particles capsule Take 3 Caps by mouth every morning. (Patient not taking: Reported on 6/22/2021) 90 Cap 2     No current facility-administered medications for this visit.   ,    ROS       Objective:     /74 (BP Location: Left arm, Patient Position: Sitting, BP Cuff  "Size: Adult)   Pulse 86   Temp 36.8 °C (98.2 °F) (Temporal)   Resp 20   Ht 1.727 m (5' 7.99\")   Wt 117 kg (257 lb)   SpO2 97%   BMI 39.09 kg/m²      Physical Exam         Neurological Exam         Assessment/Plan:        Chronic Migraine:  Botox therapy has reduced patient’s migraines by more than 7 days and/or 100 hours per month.  Additionally pt has noted a reduction in the amount of medication they are taking to treat their migraines and/or they are having a reduction in intractable migraine/status migrainosis which can result in urgent care or ER visits.     Documentation of patient's symptoms with migraine are included in the initial note with this patient including the following:  Education/counseling/reduction in medications if pt has medication overuse headache;  Frequency of headaches is >15 days monthly with at least 8 migraines monthly;  Migraines include at least two of the following: worsened with activity or avoidance of activity with migraines (ie they go lie down), moderate to severe pain intensity, pulsing headache, unilateral headache AND they have either nausea or vomiting OR sensitivity to light and sound     Although this patient is responding to botox they are NOT migraine free, however, and it is my recommendation botox be continued at this time     I treated this patient in clinic today with BotoxA 155 units according to the dosing/injection paradigm currently mandated by the FDA for the management of chronic migraine. Specifically, I injected 5 units to the procerus, 5 units to the corrugators bilaterally, a total of 20 units to the frontalis musculature, 20 units to the temporalis bilaterally, 15 units to the occipitalis bilaterally, 10 units to the cervical paraspinals bilaterally and 15 units to the trapezius musculature bilaterally. The remainder of the Botox 200 units mixed but not administered was discarded as wastage per FDA guidelines.     Will start Aimovig " 70mg/ml.  Continue nortriptyline 30mg qhs and propranolol LA 60mg qam.  Future goal of tapering off of nortriptyline as she is experiencing medication induced dry mouth.    Return for follow-up in 12 weeks for serial set of Botox.

## 2021-07-19 ENCOUNTER — PHARMACY VISIT (OUTPATIENT)
Dept: PHARMACY | Facility: MEDICAL CENTER | Age: 46
End: 2021-07-19
Payer: COMMERCIAL

## 2021-07-19 PROCEDURE — RXMED WILLOW AMBULATORY MEDICATION CHARGE: Performed by: NURSE PRACTITIONER

## 2021-07-20 ENCOUNTER — NON-PROVIDER VISIT (OUTPATIENT)
Dept: INTERNAL MEDICINE | Facility: IMAGING CENTER | Age: 46
End: 2021-07-20
Payer: COMMERCIAL

## 2021-07-20 ENCOUNTER — HOSPITAL ENCOUNTER (OUTPATIENT)
Facility: MEDICAL CENTER | Age: 46
End: 2021-07-20
Attending: FAMILY MEDICINE
Payer: COMMERCIAL

## 2021-07-20 DIAGNOSIS — R30.0 DYSURIA: ICD-10-CM

## 2021-07-20 DIAGNOSIS — N30.00 ACUTE CYSTITIS WITHOUT HEMATURIA: ICD-10-CM

## 2021-07-20 DIAGNOSIS — R82.90 ABNORMAL URINALYSIS: ICD-10-CM

## 2021-07-20 LAB
APPEARANCE UR: NORMAL
BILIRUB UR STRIP-MCNC: NEGATIVE MG/DL
COLOR UR AUTO: YELLOW
GLUCOSE UR STRIP.AUTO-MCNC: NEGATIVE MG/DL
KETONES UR STRIP.AUTO-MCNC: NEGATIVE MG/DL
LEUKOCYTE ESTERASE UR QL STRIP.AUTO: NORMAL
NITRITE UR QL STRIP.AUTO: NEGATIVE
PH UR STRIP.AUTO: 6 [PH] (ref 5–8)
PROT UR QL STRIP: NORMAL MG/DL
RBC UR QL AUTO: NORMAL
SP GR UR STRIP.AUTO: 1.01
UROBILINOGEN UR STRIP-MCNC: 0.2 MG/DL

## 2021-07-20 PROCEDURE — 87086 URINE CULTURE/COLONY COUNT: CPT

## 2021-07-20 PROCEDURE — 87077 CULTURE AEROBIC IDENTIFY: CPT

## 2021-07-20 PROCEDURE — 87186 SC STD MICRODIL/AGAR DIL: CPT

## 2021-07-20 PROCEDURE — 81002 URINALYSIS NONAUTO W/O SCOPE: CPT | Performed by: FAMILY MEDICINE

## 2021-07-20 RX ORDER — CEFDINIR 300 MG/1
300 CAPSULE ORAL 2 TIMES DAILY
Qty: 10 CAPSULE | Refills: 0 | Status: SHIPPED | OUTPATIENT
Start: 2021-07-20 | End: 2021-07-25

## 2021-07-23 LAB
BACTERIA UR CULT: ABNORMAL
BACTERIA UR CULT: ABNORMAL
SIGNIFICANT IND 70042: ABNORMAL
SITE SITE: ABNORMAL
SOURCE SOURCE: ABNORMAL

## 2021-08-03 ENCOUNTER — NON-PROVIDER VISIT (OUTPATIENT)
Dept: INTERNAL MEDICINE | Facility: IMAGING CENTER | Age: 46
End: 2021-08-03
Payer: COMMERCIAL

## 2021-08-03 DIAGNOSIS — N30.00 ACUTE CYSTITIS WITHOUT HEMATURIA: ICD-10-CM

## 2021-08-03 LAB
APPEARANCE UR: CLEAR
BILIRUB UR STRIP-MCNC: NEGATIVE MG/DL
COLOR UR AUTO: YELLOW
GLUCOSE UR STRIP.AUTO-MCNC: NEGATIVE MG/DL
KETONES UR STRIP.AUTO-MCNC: NORMAL MG/DL
LEUKOCYTE ESTERASE UR QL STRIP.AUTO: NEGATIVE
NITRITE UR QL STRIP.AUTO: NEGATIVE
PH UR STRIP.AUTO: 6 [PH] (ref 5–8)
PROT UR QL STRIP: NEGATIVE MG/DL
RBC UR QL AUTO: NEGATIVE
SP GR UR STRIP.AUTO: 1.02
UROBILINOGEN UR STRIP-MCNC: 0.2 MG/DL

## 2021-08-03 PROCEDURE — 81002 URINALYSIS NONAUTO W/O SCOPE: CPT | Performed by: FAMILY MEDICINE

## 2021-08-10 ENCOUNTER — HOSPITAL ENCOUNTER (OUTPATIENT)
Facility: MEDICAL CENTER | Age: 46
End: 2021-08-10
Attending: FAMILY MEDICINE
Payer: COMMERCIAL

## 2021-08-10 ENCOUNTER — OFFICE VISIT (OUTPATIENT)
Dept: INTERNAL MEDICINE | Facility: IMAGING CENTER | Age: 46
End: 2021-08-10
Payer: COMMERCIAL

## 2021-08-10 ENCOUNTER — HOSPITAL ENCOUNTER (OUTPATIENT)
Dept: RADIOLOGY | Facility: MEDICAL CENTER | Age: 46
End: 2021-08-10
Attending: FAMILY MEDICINE
Payer: COMMERCIAL

## 2021-08-10 VITALS
DIASTOLIC BLOOD PRESSURE: 72 MMHG | TEMPERATURE: 98.7 F | HEIGHT: 68 IN | SYSTOLIC BLOOD PRESSURE: 122 MMHG | RESPIRATION RATE: 17 BRPM | OXYGEN SATURATION: 99 % | HEART RATE: 88 BPM | WEIGHT: 257.94 LBS | BODY MASS INDEX: 39.09 KG/M2

## 2021-08-10 DIAGNOSIS — E78.2 MIXED DYSLIPIDEMIA: ICD-10-CM

## 2021-08-10 DIAGNOSIS — Z00.00 HEALTH CARE MAINTENANCE: ICD-10-CM

## 2021-08-10 DIAGNOSIS — I10 ESSENTIAL HYPERTENSION: ICD-10-CM

## 2021-08-10 DIAGNOSIS — Z12.11 SCREENING FOR COLON CANCER: ICD-10-CM

## 2021-08-10 DIAGNOSIS — R07.81 PLEURITIC CHEST PAIN: ICD-10-CM

## 2021-08-10 PROCEDURE — 71046 X-RAY EXAM CHEST 2 VIEWS: CPT

## 2021-08-10 PROCEDURE — 82306 VITAMIN D 25 HYDROXY: CPT

## 2021-08-10 PROCEDURE — 83036 HEMOGLOBIN GLYCOSYLATED A1C: CPT

## 2021-08-10 PROCEDURE — 80053 COMPREHEN METABOLIC PANEL: CPT

## 2021-08-10 PROCEDURE — 80061 LIPID PANEL: CPT

## 2021-08-10 PROCEDURE — 99214 OFFICE O/P EST MOD 30 MIN: CPT | Performed by: FAMILY MEDICINE

## 2021-08-10 PROCEDURE — 84443 ASSAY THYROID STIM HORMONE: CPT

## 2021-08-10 PROCEDURE — 85025 COMPLETE CBC W/AUTO DIFF WBC: CPT

## 2021-08-10 RX ORDER — ALBUTEROL SULFATE 90 UG/1
2 AEROSOL, METERED RESPIRATORY (INHALATION) EVERY 4 HOURS PRN
Qty: 18 G | Refills: 1 | Status: SHIPPED | OUTPATIENT
Start: 2021-08-10

## 2021-08-10 ASSESSMENT — FIBROSIS 4 INDEX: FIB4 SCORE: 1.64

## 2021-08-11 PROBLEM — Z86.16 HISTORY OF COVID-19: Status: ACTIVE | Noted: 2020-12-01

## 2021-08-11 LAB
25(OH)D3 SERPL-MCNC: 45 NG/ML (ref 30–100)
ALBUMIN SERPL BCP-MCNC: 4.4 G/DL (ref 3.2–4.9)
ALBUMIN/GLOB SERPL: 1.5 G/DL
ALP SERPL-CCNC: 58 U/L (ref 30–99)
ALT SERPL-CCNC: 52 U/L (ref 2–50)
ANION GAP SERPL CALC-SCNC: 11 MMOL/L (ref 7–16)
AST SERPL-CCNC: 34 U/L (ref 12–45)
BASOPHILS # BLD AUTO: 0.7 % (ref 0–1.8)
BASOPHILS # BLD: 0.05 K/UL (ref 0–0.12)
BILIRUB SERPL-MCNC: 0.5 MG/DL (ref 0.1–1.5)
BUN SERPL-MCNC: 12 MG/DL (ref 8–22)
CALCIUM SERPL-MCNC: 9.6 MG/DL (ref 8.5–10.5)
CHLORIDE SERPL-SCNC: 103 MMOL/L (ref 96–112)
CHOLEST SERPL-MCNC: 258 MG/DL (ref 100–199)
CO2 SERPL-SCNC: 24 MMOL/L (ref 20–33)
CREAT SERPL-MCNC: 0.81 MG/DL (ref 0.5–1.4)
EOSINOPHIL # BLD AUTO: 0.12 K/UL (ref 0–0.51)
EOSINOPHIL NFR BLD: 1.6 % (ref 0–6.9)
ERYTHROCYTE [DISTWIDTH] IN BLOOD BY AUTOMATED COUNT: 48.2 FL (ref 35.9–50)
EST. AVERAGE GLUCOSE BLD GHB EST-MCNC: 111 MG/DL
GLOBULIN SER CALC-MCNC: 2.9 G/DL (ref 1.9–3.5)
GLUCOSE SERPL-MCNC: 98 MG/DL (ref 65–99)
HBA1C MFR BLD: 5.5 % (ref 4–5.6)
HCT VFR BLD AUTO: 45.9 % (ref 37–47)
HDLC SERPL-MCNC: 45 MG/DL
HGB BLD-MCNC: 15 G/DL (ref 12–16)
IMM GRANULOCYTES # BLD AUTO: 0.03 K/UL (ref 0–0.11)
IMM GRANULOCYTES NFR BLD AUTO: 0.4 % (ref 0–0.9)
LDLC SERPL CALC-MCNC: 181 MG/DL
LYMPHOCYTES # BLD AUTO: 1.84 K/UL (ref 1–4.8)
LYMPHOCYTES NFR BLD: 25.2 % (ref 22–41)
MCH RBC QN AUTO: 31.9 PG (ref 27–33)
MCHC RBC AUTO-ENTMCNC: 32.7 G/DL (ref 33.6–35)
MCV RBC AUTO: 97.7 FL (ref 81.4–97.8)
MONOCYTES # BLD AUTO: 0.53 K/UL (ref 0–0.85)
MONOCYTES NFR BLD AUTO: 7.3 % (ref 0–13.4)
NEUTROPHILS # BLD AUTO: 4.73 K/UL (ref 2–7.15)
NEUTROPHILS NFR BLD: 64.8 % (ref 44–72)
NRBC # BLD AUTO: 0 K/UL
NRBC BLD-RTO: 0 /100 WBC
PLATELET # BLD AUTO: 209 K/UL (ref 164–446)
PMV BLD AUTO: 11.4 FL (ref 9–12.9)
POTASSIUM SERPL-SCNC: 4.6 MMOL/L (ref 3.6–5.5)
PROT SERPL-MCNC: 7.3 G/DL (ref 6–8.2)
RBC # BLD AUTO: 4.7 M/UL (ref 4.2–5.4)
SODIUM SERPL-SCNC: 138 MMOL/L (ref 135–145)
TRIGL SERPL-MCNC: 160 MG/DL (ref 0–149)
TSH SERPL DL<=0.005 MIU/L-ACNC: 1.49 UIU/ML (ref 0.38–5.33)
WBC # BLD AUTO: 7.3 K/UL (ref 4.8–10.8)
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
## 2021-08-12 DIAGNOSIS — E78.2 MIXED DYSLIPIDEMIA: ICD-10-CM

## 2021-08-12 RX ORDER — ATORVASTATIN CALCIUM 40 MG/1
40 TABLET, FILM COATED ORAL
Qty: 90 TABLET | Refills: 0 | Status: SHIPPED | OUTPATIENT
Start: 2021-08-12 | End: 2021-11-02 | Stop reason: SDUPTHER

## 2021-08-16 ENCOUNTER — PHARMACY VISIT (OUTPATIENT)
Dept: PHARMACY | Facility: MEDICAL CENTER | Age: 46
End: 2021-08-16
Payer: COMMERCIAL

## 2021-08-16 PROCEDURE — RXMED WILLOW AMBULATORY MEDICATION CHARGE: Performed by: NURSE PRACTITIONER

## 2021-09-13 ENCOUNTER — PHARMACY VISIT (OUTPATIENT)
Dept: PHARMACY | Facility: MEDICAL CENTER | Age: 46
End: 2021-09-13
Payer: COMMERCIAL

## 2021-09-13 PROCEDURE — RXMED WILLOW AMBULATORY MEDICATION CHARGE: Performed by: NURSE PRACTITIONER

## 2021-09-14 ENCOUNTER — OFFICE VISIT (OUTPATIENT)
Dept: NEUROLOGY | Facility: MEDICAL CENTER | Age: 46
End: 2021-09-14
Attending: PSYCHIATRY & NEUROLOGY
Payer: COMMERCIAL

## 2021-09-14 ENCOUNTER — TELEPHONE (OUTPATIENT)
Dept: NEUROLOGY | Facility: MEDICAL CENTER | Age: 46
End: 2021-09-14

## 2021-09-14 VITALS
TEMPERATURE: 97.9 F | HEIGHT: 68 IN | DIASTOLIC BLOOD PRESSURE: 78 MMHG | HEART RATE: 88 BPM | WEIGHT: 266.1 LBS | BODY MASS INDEX: 40.33 KG/M2 | SYSTOLIC BLOOD PRESSURE: 140 MMHG | OXYGEN SATURATION: 94 %

## 2021-09-14 DIAGNOSIS — G43.709 CHRONIC MIGRAINE W/O AURA W/O STATUS MIGRAINOSUS, NOT INTRACTABLE: ICD-10-CM

## 2021-09-14 PROCEDURE — 700111 HCHG RX REV CODE 636 W/ 250 OVERRIDE (IP): Mod: JW

## 2021-09-14 PROCEDURE — 64615 CHEMODENERV MUSC MIGRAINE: CPT | Performed by: PSYCHIATRY & NEUROLOGY

## 2021-09-14 RX ORDER — ERENUMAB-AOOE 70 MG/ML
70 INJECTION SUBCUTANEOUS
Qty: 1 ML | Refills: 12 | Status: SHIPPED | OUTPATIENT
Start: 2021-09-14 | End: 2021-09-14 | Stop reason: SDUPTHER

## 2021-09-14 RX ORDER — NORTRIPTYLINE HYDROCHLORIDE 10 MG/1
30 CAPSULE ORAL DAILY
Qty: 270 CAPSULE | Refills: 1 | Status: SHIPPED | OUTPATIENT
Start: 2021-09-14 | End: 2023-02-13 | Stop reason: SDUPTHER

## 2021-09-14 RX ORDER — PROPRANOLOL HCL 60 MG
60 CAPSULE, EXTENDED RELEASE 24HR ORAL DAILY
Qty: 90 CAPSULE | Refills: 1 | Status: SHIPPED | OUTPATIENT
Start: 2021-09-14 | End: 2023-04-03 | Stop reason: SDUPTHER

## 2021-09-14 RX ORDER — ERENUMAB-AOOE 70 MG/ML
70 INJECTION SUBCUTANEOUS
Qty: 1 ML | Refills: 12 | Status: SHIPPED | OUTPATIENT
Start: 2021-09-14 | End: 2021-12-29

## 2021-09-14 ASSESSMENT — ENCOUNTER SYMPTOMS: HEADACHES: 1

## 2021-09-14 ASSESSMENT — FIBROSIS 4 INDEX: FIB4 SCORE: 1.04
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
## 2021-10-11 PROCEDURE — RXMED WILLOW AMBULATORY MEDICATION CHARGE: Performed by: PSYCHIATRY & NEUROLOGY

## 2021-10-12 ENCOUNTER — PHARMACY VISIT (OUTPATIENT)
Dept: PHARMACY | Facility: MEDICAL CENTER | Age: 46
End: 2021-10-12
Payer: COMMERCIAL

## 2021-10-19 ENCOUNTER — NON-PROVIDER VISIT (OUTPATIENT)
Dept: INTERNAL MEDICINE | Facility: IMAGING CENTER | Age: 46
End: 2021-10-19
Payer: COMMERCIAL

## 2021-10-19 DIAGNOSIS — Z23 NEED FOR VACCINATION: ICD-10-CM

## 2021-10-19 PROCEDURE — 90686 IIV4 VACC NO PRSV 0.5 ML IM: CPT | Performed by: FAMILY MEDICINE

## 2021-10-19 PROCEDURE — 90471 IMMUNIZATION ADMIN: CPT | Performed by: FAMILY MEDICINE

## 2021-11-02 ENCOUNTER — HOSPITAL ENCOUNTER (OUTPATIENT)
Facility: MEDICAL CENTER | Age: 46
End: 2021-11-02
Attending: FAMILY MEDICINE
Payer: COMMERCIAL

## 2021-11-02 ENCOUNTER — NON-PROVIDER VISIT (OUTPATIENT)
Dept: INTERNAL MEDICINE | Facility: IMAGING CENTER | Age: 46
End: 2021-11-02
Payer: COMMERCIAL

## 2021-11-02 DIAGNOSIS — Z20.822 ENCOUNTER FOR LABORATORY TESTING FOR COVID-19 VIRUS: ICD-10-CM

## 2021-11-02 DIAGNOSIS — J02.9 SORE THROAT: ICD-10-CM

## 2021-11-02 DIAGNOSIS — E78.2 MIXED DYSLIPIDEMIA: ICD-10-CM

## 2021-11-02 LAB
COVID ORDER STATUS COVID19: NORMAL
INT CON NEG: NORMAL
INT CON POS: NORMAL
S PYO AG THROAT QL: NEGATIVE

## 2021-11-02 PROCEDURE — U0005 INFEC AGEN DETEC AMPLI PROBE: HCPCS

## 2021-11-02 PROCEDURE — 87880 STREP A ASSAY W/OPTIC: CPT | Performed by: FAMILY MEDICINE

## 2021-11-02 PROCEDURE — U0003 INFECTIOUS AGENT DETECTION BY NUCLEIC ACID (DNA OR RNA); SEVERE ACUTE RESPIRATORY SYNDROME CORONAVIRUS 2 (SARS-COV-2) (CORONAVIRUS DISEASE [COVID-19]), AMPLIFIED PROBE TECHNIQUE, MAKING USE OF HIGH THROUGHPUT TECHNOLOGIES AS DESCRIBED BY CMS-2020-01-R: HCPCS

## 2021-11-02 RX ORDER — ATORVASTATIN CALCIUM 40 MG/1
40 TABLET, FILM COATED ORAL
Qty: 30 TABLET | Refills: 0 | Status: SHIPPED | OUTPATIENT
Start: 2021-11-02 | End: 2021-12-01 | Stop reason: SDUPTHER

## 2021-11-03 LAB
SARS-COV-2 RNA RESP QL NAA+PROBE: NOTDETECTED
SPECIMEN SOURCE: NORMAL

## 2021-11-04 DIAGNOSIS — J01.41 ACUTE RECURRENT PANSINUSITIS: ICD-10-CM

## 2021-11-04 RX ORDER — AZITHROMYCIN 250 MG/1
TABLET, FILM COATED ORAL
Qty: 6 TABLET | Refills: 0 | Status: SHIPPED
Start: 2021-11-04 | End: 2021-12-29

## 2021-11-15 DIAGNOSIS — J01.41 ACUTE RECURRENT PANSINUSITIS: ICD-10-CM

## 2021-11-15 RX ORDER — AMOXICILLIN AND CLAVULANATE POTASSIUM 875; 125 MG/1; MG/1
1 TABLET, FILM COATED ORAL 2 TIMES DAILY
Qty: 14 TABLET | Refills: 0 | Status: SHIPPED | OUTPATIENT
Start: 2021-11-15 | End: 2021-11-22

## 2021-11-22 DIAGNOSIS — J32.4 CHRONIC PANSINUSITIS: ICD-10-CM

## 2021-11-22 RX ORDER — METHYLPREDNISOLONE 4 MG/1
TABLET ORAL
Qty: 21 TABLET | Refills: 0 | Status: SHIPPED
Start: 2021-11-22 | End: 2021-12-29

## 2021-12-01 DIAGNOSIS — E78.2 MIXED DYSLIPIDEMIA: ICD-10-CM

## 2021-12-01 RX ORDER — ATORVASTATIN CALCIUM 40 MG/1
40 TABLET, FILM COATED ORAL
Qty: 30 TABLET | Refills: 0 | Status: SHIPPED | OUTPATIENT
Start: 2021-12-01 | End: 2021-12-29 | Stop reason: SDUPTHER

## 2021-12-07 ENCOUNTER — NON-PROVIDER VISIT (OUTPATIENT)
Dept: INTERNAL MEDICINE | Facility: IMAGING CENTER | Age: 46
End: 2021-12-07
Payer: COMMERCIAL

## 2021-12-07 ENCOUNTER — HOSPITAL ENCOUNTER (OUTPATIENT)
Facility: MEDICAL CENTER | Age: 46
End: 2021-12-07
Attending: FAMILY MEDICINE
Payer: COMMERCIAL

## 2021-12-07 DIAGNOSIS — E78.2 MIXED DYSLIPIDEMIA: ICD-10-CM

## 2021-12-07 LAB
CHOLEST SERPL-MCNC: 201 MG/DL (ref 100–199)
CK SERPL-CCNC: 189 U/L (ref 0–154)
HDLC SERPL-MCNC: 45 MG/DL
LDLC SERPL CALC-MCNC: 105 MG/DL
TRIGL SERPL-MCNC: 255 MG/DL (ref 0–149)

## 2021-12-07 PROCEDURE — 82550 ASSAY OF CK (CPK): CPT

## 2021-12-07 PROCEDURE — 80061 LIPID PANEL: CPT

## 2021-12-14 ENCOUNTER — HOSPITAL ENCOUNTER (OUTPATIENT)
Dept: RADIOLOGY | Facility: MEDICAL CENTER | Age: 46
End: 2021-12-14
Attending: FAMILY MEDICINE
Payer: COMMERCIAL

## 2021-12-14 DIAGNOSIS — Z12.31 VISIT FOR SCREENING MAMMOGRAM: ICD-10-CM

## 2021-12-14 PROCEDURE — 77063 BREAST TOMOSYNTHESIS BI: CPT

## 2021-12-29 ENCOUNTER — OFFICE VISIT (OUTPATIENT)
Dept: INTERNAL MEDICINE | Facility: IMAGING CENTER | Age: 46
End: 2021-12-29
Payer: COMMERCIAL

## 2021-12-29 VITALS
WEIGHT: 266.76 LBS | TEMPERATURE: 97.9 F | DIASTOLIC BLOOD PRESSURE: 72 MMHG | BODY MASS INDEX: 40.43 KG/M2 | HEART RATE: 79 BPM | HEIGHT: 68 IN | SYSTOLIC BLOOD PRESSURE: 140 MMHG | OXYGEN SATURATION: 99 % | RESPIRATION RATE: 17 BRPM

## 2021-12-29 DIAGNOSIS — K21.9 GASTROESOPHAGEAL REFLUX DISEASE WITHOUT ESOPHAGITIS: ICD-10-CM

## 2021-12-29 DIAGNOSIS — Z86.16 HISTORY OF COVID-19: ICD-10-CM

## 2021-12-29 DIAGNOSIS — Z12.4 CERVICAL CANCER SCREENING: ICD-10-CM

## 2021-12-29 DIAGNOSIS — E78.2 MIXED DYSLIPIDEMIA: ICD-10-CM

## 2021-12-29 PROCEDURE — 99214 OFFICE O/P EST MOD 30 MIN: CPT | Performed by: FAMILY MEDICINE

## 2021-12-29 RX ORDER — OMEPRAZOLE 20 MG/1
20 CAPSULE, DELAYED RELEASE ORAL EVERY EVENING
Qty: 90 CAPSULE | Refills: 3 | Status: SHIPPED | OUTPATIENT
Start: 2021-12-29 | End: 2023-02-01 | Stop reason: SDUPTHER

## 2021-12-29 RX ORDER — ATORVASTATIN CALCIUM 40 MG/1
40 TABLET, FILM COATED ORAL
Qty: 90 TABLET | Refills: 1 | Status: SHIPPED | OUTPATIENT
Start: 2021-12-29 | End: 2022-06-23 | Stop reason: SDUPTHER

## 2021-12-29 ASSESSMENT — FIBROSIS 4 INDEX: FIB4 SCORE: 1.04

## 2021-12-29 NOTE — PROGRESS NOTES
"Chief Complaint   Patient presents with   • Lab Results   • Medication Refill     Omeprazole       HPI:  Patient is a 46 y.o. female established patient who presents today to review lab results 12/7/2021. She has chronic mixed dyslipidemia and is tolerating Lipitor 40 mg nightly use well. She has history of chronic GERD with ongoing Omeprazole use, and she continues to work with Rufina MASON on improving her migraine management. She is seeing [facility] for SI joint pain and denies other new concerns at this time.     Patient Active Problem List    Diagnosis Date Noted   • History of COVID-19 12/01/2020   • Dry mouth 09/29/2020   • Obesity (BMI 30-39.9) 09/13/2019   • Mixed dyslipidemia 09/13/2019   • SVT (supraventricular tachycardia) ([facility]) 09/08/2019   • Hypertension 09/08/2019   • GERD (gastroesophageal reflux disease) 09/08/2019   • Cervicogenic headache 12/12/2018   • Chronic migraine 03/28/2018       Past medical, surgical, family, and social history was reviewed and updated in Epic chart by me today.     Medications and allergies reviewed and updated in Epic chart by me today.     Lab results 12/7/2021 and 8/10/2021 reviewed with patient at visit today.     ROS:  Pertinent positives listed above in HPI. All other systems have been reviewed and are negative.    PE:   /72 (BP Location: Left arm, Patient Position: Sitting, BP Cuff Size: Adult)   Pulse 79   Temp 36.6 °C (97.9 °F) (Temporal)   Resp 17   Ht 1.727 m (5' 8\")   Wt 121 kg (266 lb 12.1 oz)   SpO2 99%   BMI 40.56 kg/m²   Vital signs reviewed with patient.     Gen: Well developed; well nourished; no acute distress; age appropriate appearance   Skin: Warm and dry; no rashes noted   Neuro: No focal deficits noted   Psych: AAOx4; mood and affect are appropriate    A/P:  1. Mixed dyslipidemia  Total cholesterol and LDL levels have markedly improved from August 2021 to present with nightly Lipitor 40 mg use. We discussed significant " increase in triglyceride level over this time frame, and recommend patient continue current Lipitor use, make necessary lifestyle and diet changes, and repeat fasting lipid panel in April 2022. New RX sent to pharmacy.   - atorvastatin (LIPITOR) 40 MG Tab; Take 1 Tablet by mouth at bedtime.  Dispense: 90 Tablet; Refill: 1  - Lipid Profile; Future    2. Gastroesophageal reflux disease without esophagitis  Stable with ongoing daily PPI use. New RX sent to pharmacy.   - omeprazole (PRILOSEC) 20 MG delayed-release capsule; Take 1 Capsule by mouth every evening.  Dispense: 90 Capsule; Refill: 3    3. History of COVID-19  Patient has lingering loss of taste and smell after suffering from COVID-19 infection in December 2020. She is considering seeing Dr. Delgadillo for ENT evaluation and will let me know if she needs a formal referral.     4. Cervical cancer screening  Patient is aware of being due for GYN exam with pap smear and will make appointment with her GYN provider soon.

## 2022-01-05 ENCOUNTER — TELEPHONE (OUTPATIENT)
Dept: NEUROLOGY | Facility: MEDICAL CENTER | Age: 47
End: 2022-01-05

## 2022-01-06 NOTE — TELEPHONE ENCOUNTER
Emgality 120mg/ml Soln Auto-Injector    Called into [facility] [phone] for PA case ID [id number] good loading dose 01/05/2022 to 02/04/2022 TC paid copay $100.00 #2ml/30 DS notifying liaison Sosa Juarez. - 01/05/2022 4:13pm

## 2022-01-25 NOTE — PROGRESS NOTES
Received patient from ED. Ambulated from [facility] to bed with no assistance. Steady gait. No complaints at this time. Will continue to monitor.   [FreeTextEntry1] : Pt is a 73 year old [place] female here for f/u after recent R stereo bx showed DCIS for abnormal mmg.\par Pt denies any breast lesions, discharge or masses.\par Pt is [place]. \par Referred by Dr. Isaiah Brown\par \par 12/14/21, Geoff SALGADO sMMG: het dense, cluster of microcalcs, ? branching, approx 12:00 position middle or posterior depth, rec add imaging, BR0\par 12/23/21, HUMA SALGADO dMMG: het dense, localizable to approx 12:00 mid depth calcs susp for malignancy, bx rec, BR4\par 1/6/22, Geoff Marsh U/S: R no susp solid mass, 10mm cyst UOQ, L no susp solid mass, prelim images question region of altered echotexture in 9:00 position not reproduced on repeat U/S, f/u u/s 6 mos rec, BR3\par 1/18/22, HUMA Anthony stereo bx grouped calcs 12:00 PATH: DCIS solid pattern w/ high grade nuclear atypia and comedonecrosis, ER/HI pending, concordant, rec surg/onc mgmt\par \par Fhx: Mom w/ ? reproductive Ca - poss ovarian?, 2 mCousins w/ Ovarian Ca, and Father w/ Leukemia at 90. \par \par

## 2022-01-26 ENCOUNTER — PHARMACY VISIT (OUTPATIENT)
Dept: PHARMACY | Facility: MEDICAL CENTER | Age: 47
End: 2022-01-26
Payer: COMMERCIAL

## 2022-01-26 PROCEDURE — RXMED WILLOW AMBULATORY MEDICATION CHARGE: Performed by: NURSE PRACTITIONER

## 2022-03-02 DIAGNOSIS — I10 ESSENTIAL HYPERTENSION: ICD-10-CM

## 2022-03-03 RX ORDER — LOSARTAN POTASSIUM 50 MG/1
TABLET ORAL
Qty: 90 TABLET | Refills: 1 | Status: SHIPPED | OUTPATIENT
Start: 2022-03-03 | End: 2022-08-24 | Stop reason: SDUPTHER

## 2022-04-29 DIAGNOSIS — J32.9 RECURRENT SINUS INFECTIONS: ICD-10-CM

## 2022-04-29 RX ORDER — AMOXICILLIN AND CLAVULANATE POTASSIUM 875; 125 MG/1; MG/1
1 TABLET, FILM COATED ORAL 2 TIMES DAILY
Qty: 14 TABLET | Refills: 0 | Status: SHIPPED | OUTPATIENT
Start: 2022-04-29 | End: 2022-06-03 | Stop reason: SDUPTHER

## 2022-06-03 DIAGNOSIS — J32.9 RECURRENT SINUS INFECTIONS: ICD-10-CM

## 2022-06-03 RX ORDER — AMOXICILLIN AND CLAVULANATE POTASSIUM 875; 125 MG/1; MG/1
1 TABLET, FILM COATED ORAL 2 TIMES DAILY
Qty: 14 TABLET | Refills: 0 | Status: SHIPPED | OUTPATIENT
Start: 2022-06-03 | End: 2022-06-10

## 2022-06-15 ENCOUNTER — APPOINTMENT (RX ONLY)
Dept: URBAN - METROPOLITAN AREA CLINIC 4 | Facility: CLINIC | Age: 47
Setting detail: DERMATOLOGY
End: 2022-06-15

## 2022-06-15 DIAGNOSIS — L57.0 ACTINIC KERATOSIS: ICD-10-CM

## 2022-06-15 DIAGNOSIS — R23.1 PALLOR: ICD-10-CM

## 2022-06-15 DIAGNOSIS — L82.1 OTHER SEBORRHEIC KERATOSIS: ICD-10-CM

## 2022-06-15 DIAGNOSIS — L259 CONTACT DERMATITIS AND OTHER ECZEMA, UNSPECIFIED CAUSE: ICD-10-CM

## 2022-06-15 DIAGNOSIS — D22 MELANOCYTIC NEVI: ICD-10-CM

## 2022-06-15 PROBLEM — D22.39 MELANOCYTIC NEVI OF OTHER PARTS OF FACE: Status: ACTIVE | Noted: 2022-06-15

## 2022-06-15 PROBLEM — D22.71 MELANOCYTIC NEVI OF RIGHT LOWER LIMB, INCLUDING HIP: Status: ACTIVE | Noted: 2022-06-15

## 2022-06-15 PROBLEM — D22.5 MELANOCYTIC NEVI OF TRUNK: Status: ACTIVE | Noted: 2022-06-15

## 2022-06-15 PROBLEM — D48.5 NEOPLASM OF UNCERTAIN BEHAVIOR OF SKIN: Status: ACTIVE | Noted: 2022-06-15

## 2022-06-15 PROBLEM — D22.62 MELANOCYTIC NEVI OF LEFT UPPER LIMB, INCLUDING SHOULDER: Status: ACTIVE | Noted: 2022-06-15

## 2022-06-15 PROBLEM — D22.61 MELANOCYTIC NEVI OF RIGHT UPPER LIMB, INCLUDING SHOULDER: Status: ACTIVE | Noted: 2022-06-15

## 2022-06-15 PROBLEM — L30.8 OTHER SPECIFIED DERMATITIS: Status: ACTIVE | Noted: 2022-06-15

## 2022-06-15 PROBLEM — D23.62 OTHER BENIGN NEOPLASM OF SKIN OF LEFT UPPER LIMB, INCLUDING SHOULDER: Status: ACTIVE | Noted: 2022-06-15

## 2022-06-15 PROBLEM — D22.72 MELANOCYTIC NEVI OF LEFT LOWER LIMB, INCLUDING HIP: Status: ACTIVE | Noted: 2022-06-15

## 2022-06-15 PROCEDURE — ? ADDITIONAL NOTES

## 2022-06-15 PROCEDURE — ? COUNSELING

## 2022-06-15 PROCEDURE — 11102 TANGNTL BX SKIN SINGLE LES: CPT

## 2022-06-15 PROCEDURE — ? MEDICATION COUNSELING

## 2022-06-15 PROCEDURE — 17000 DESTRUCT PREMALG LESION: CPT | Mod: 59

## 2022-06-15 PROCEDURE — ? PRESCRIPTION

## 2022-06-15 PROCEDURE — ? LIQUID NITROGEN

## 2022-06-15 PROCEDURE — 99213 OFFICE O/P EST LOW 20 MIN: CPT | Mod: 25

## 2022-06-15 PROCEDURE — 17003 DESTRUCT PREMALG LES 2-14: CPT

## 2022-06-15 PROCEDURE — ? BIOPSY BY SHAVE METHOD

## 2022-06-15 RX ORDER — TRIAMCINOLONE ACETONIDE 1 MG/G
CREAM TOPICAL BID
Qty: 30 | Refills: 3 | Status: ERX | COMMUNITY
Start: 2022-06-15

## 2022-06-15 RX ADMIN — TRIAMCINOLONE ACETONIDE 1: 1 CREAM TOPICAL at 00:00

## 2022-06-15 ASSESSMENT — LOCATION SIMPLE DESCRIPTION DERM
LOCATION SIMPLE: CHEST
LOCATION SIMPLE: LEFT UPPER BACK
LOCATION SIMPLE: SUPERIOR FOREHEAD
LOCATION SIMPLE: NOSE
LOCATION SIMPLE: LEFT THIGH
LOCATION SIMPLE: LEFT POSTERIOR UPPER ARM
LOCATION SIMPLE: RIGHT CHEEK
LOCATION SIMPLE: LEFT FOOT
LOCATION SIMPLE: RIGHT PRETIBIAL REGION
LOCATION SIMPLE: RIGHT POSTERIOR UPPER ARM
LOCATION SIMPLE: RIGHT FOOT
LOCATION SIMPLE: RIGHT THIGH
LOCATION SIMPLE: UPPER BACK
LOCATION SIMPLE: LEFT BREAST
LOCATION SIMPLE: LEFT PRETIBIAL REGION

## 2022-06-15 ASSESSMENT — LOCATION DETAILED DESCRIPTION DERM
LOCATION DETAILED: LEFT MID-UPPER BACK
LOCATION DETAILED: LEFT NIPPLE
LOCATION DETAILED: MIDDLE STERNUM
LOCATION DETAILED: NASAL TIP
LOCATION DETAILED: LEFT ANTERIOR PROXIMAL THIGH
LOCATION DETAILED: SUPERIOR MID FOREHEAD
LOCATION DETAILED: LEFT MEDIAL UPPER BACK
LOCATION DETAILED: LEFT NASAL DORSUM
LOCATION DETAILED: RIGHT DISTAL PRETIBIAL REGION
LOCATION DETAILED: LEFT PROXIMAL POSTERIOR UPPER ARM
LOCATION DETAILED: RIGHT INFERIOR CENTRAL MALAR CHEEK
LOCATION DETAILED: LEFT DISTAL PRETIBIAL REGION
LOCATION DETAILED: RIGHT ANTERIOR PROXIMAL THIGH
LOCATION DETAILED: LOWER STERNUM
LOCATION DETAILED: RIGHT DISTAL POSTERIOR UPPER ARM
LOCATION DETAILED: LEFT DORSAL FOOT
LOCATION DETAILED: NASAL SUPRATIP
LOCATION DETAILED: RIGHT PROXIMAL POSTERIOR UPPER ARM
LOCATION DETAILED: LEFT PROXIMAL PRETIBIAL REGION
LOCATION DETAILED: RIGHT DORSAL FOOT
LOCATION DETAILED: SUPERIOR THORACIC SPINE
LOCATION DETAILED: RIGHT PROXIMAL PRETIBIAL REGION
LOCATION DETAILED: LEFT DISTAL POSTERIOR UPPER ARM

## 2022-06-15 ASSESSMENT — LOCATION ZONE DERM
LOCATION ZONE: FEET
LOCATION ZONE: NOSE
LOCATION ZONE: TRUNK
LOCATION ZONE: FACE
LOCATION ZONE: ARM
LOCATION ZONE: LEG

## 2022-06-15 NOTE — PROCEDURE: LIQUID NITROGEN
Detail Level: Detailed
Show Applicator Variable?: Yes
Render Post-Care Instructions In Note?: no
Consent: The patient's consent was obtained including but not limited to risks of crusting, scabbing, blistering, scarring, darker or lighter pigmentary change, recurrence, incomplete removal and infection.
Duration Of Freeze Thaw-Cycle (Seconds): 3
Post-Care Instructions: I reviewed with the patient in detail post-care instructions. Patient is to wear sunprotection, and avoid picking at any of the treated lesions. Pt may apply Vaseline to crusted or scabbing areas.

## 2022-06-15 NOTE — PROCEDURE: ADDITIONAL NOTES
Render Risk Assessment In Note?: no
Detail Level: Simple
Additional Notes: Patient states PCP did full work up and she has seen vascular specialists. No known underline connective tissue disorders or known vasculitis. Patient states it has been present for many years without significant change. Evident today as the exam room was cold, which is an eliciting factor for the patient
Additional Notes: Discussed excision vs monitoring, patient elects to monitor at this time .\\nShe will call if she notices any changes.
Additional Notes: patient had a normal mammogram 9 months ago and has an appointment with OBGYN in 2 months.\\nIf rash does not respond to topical steroids cream we will consider biopsy.

## 2022-06-15 NOTE — PROCEDURE: MEDICATION COUNSELING
Rinvoq Pregnancy And Lactation Text: Based on animal studies, Rinvoq may cause embryo-fetal harm when administered to pregnant women.  The medication should not be used in pregnancy.  Breastfeeding is not recommended during treatment and for 6 days after the last dose.
Klisyri Pregnancy And Lactation Text: It is unknown if this medication can harm a developing fetus or if it is excreted in breast milk.
Xolair Pregnancy And Lactation Text: This medication is Pregnancy Category B and is considered safe during pregnancy. This medication is excreted in breast milk.
Hydroxychloroquine Counseling:  I discussed with the patient that a baseline ophthalmologic exam is needed at the start of therapy and every year thereafter while on therapy. A CBC may also be warranted for monitoring.  The side effects of this medication were discussed with the patient, including but not limited to agranulocytosis, aplastic anemia, seizures, rashes, retinopathy, and liver toxicity. Patient instructed to call the office should any adverse effect occur.  The patient verbalized understanding of the proper use and possible adverse effects of Plaquenil.  All the patient's questions and concerns were addressed.
5-Fu Counseling: 5-Fluorouracil Counseling:  I discussed with the patient the risks of 5-fluorouracil including but not limited to erythema, scaling, itching, weeping, crusting, and pain.
Glycopyrrolate Pregnancy And Lactation Text: This medication is Pregnancy Category B and is considered safe during pregnancy. It is unknown if it is excreted breast milk.
Terbinafine Pregnancy And Lactation Text: This medication is Pregnancy Category B and is considered safe during pregnancy. It is also excreted in breast milk and breast feeding isn't recommended.
Cantharidin Pregnancy And Lactation Text: The use of this medication during pregnancy or lactation is not recommended as there is insufficient data.
Cibinqo Counseling: I discussed with the patient the risks of Cibinqo therapy including but not limited to common cold, nausea, headache, cold sores, increased blood CPK levels, dizziness, UTIs, fatigue, acne, and vomitting. Live vaccines should be avoided.  This medication has been linked to serious infections; higher rate of mortality; malignancy and lymphoproliferative disorders; major adverse cardiovascular events; thrombosis; thrombocytopenia and lymphopenia; lipid elevations; and retinal detachment.
Bexarotene Pregnancy And Lactation Text: This medication is Pregnancy Category X and should not be given to women who are pregnant or may become pregnant. This medication should not be used if you are breast feeding.
Quinolones Counseling:  I discussed with the patient the risks of fluoroquinolones including but not limited to GI upset, allergic reaction, drug rash, diarrhea, dizziness, photosensitivity, yeast infections, liver function test abnormalities, tendonitis/tendon rupture.
Topical Retinoid Pregnancy And Lactation Text: This medication is Pregnancy Category C. It is unknown if this medication is excreted in breast milk.
Rituxan Counseling:  I discussed with the patient the risks of Rituxan infusions. Side effects can include infusion reactions, severe drug rashes including mucocutaneous reactions, reactivation of latent hepatitis and other infections and rarely progressive multifocal leukoencephalopathy.  All of the patient's questions and concerns were addressed.
Minoxidil Counseling: Minoxidil is a topical medication which can increase blood flow where it is applied. It is uncertain how this medication increases hair growth. Side effects are uncommon and include stinging and allergic reactions.
Tazorac Counseling:  Patient advised that medication is irritating and drying.  Patient may need to apply sparingly and wash off after an hour before eventually leaving it on overnight.  The patient verbalized understanding of the proper use and possible adverse effects of tazorac.  All of the patient's questions and concerns were addressed.
Cimzia Counseling:  I discussed with the patient the risks of Cimzia including but not limited to immunosuppression, allergic reactions and infections.  The patient understands that monitoring is required including a PPD at baseline and must alert us or the primary physician if symptoms of infection or other concerning signs are noted.
Hydroxychloroquine Pregnancy And Lactation Text: This medication has been shown to cause fetal harm but it isn't assigned a Pregnancy Risk Category. There are small amounts excreted in breast milk.
Birth Control Pills Pregnancy And Lactation Text: This medication should be avoided if pregnant and for the first 30 days post-partum.
Cibinqo Pregnancy And Lactation Text: It is unknown if this medication will adversely affect pregnancy or breast feeding.  You should not take this medication if you are currently pregnant or planning a pregnancy or while breastfeeding.
Birth Control Pills Counseling: Birth Control Pill Counseling: I discussed with the patient the potential side effects of OCPs including but not limited to increased risk of stroke, heart attack, thrombophlebitis, deep venous thrombosis, hepatic adenomas, breast changes, GI upset, headaches, and depression.  The patient verbalized understanding of the proper use and possible adverse effects of OCPs. All of the patient's questions and concerns were addressed.
Detail Level: Simple
Quinolones Pregnancy And Lactation Text: This medication is Pregnancy Category C and it isn't know if it is safe during pregnancy. It is also excreted in breast milk.
Isotretinoin Counseling: Patient should get monthly blood tests, not donate blood, not drive at night if vision affected, not share medication, and not undergo elective surgery for 6 months after tx completed. Side effects reviewed, pt to contact office should one occur.
Arava Counseling:  Patient counseled regarding adverse effects of Arava including but not limited to nausea, vomiting, abnormalities in liver function tests. Patients may develop mouth sores, rash, diarrhea, and abnormalities in blood counts. The patient understands that monitoring is required including LFTs and blood counts.  There is a rare possibility of scarring of the liver and lung problems that can occur when taking methotrexate. Persistent nausea, loss of appetite, pale stools, dark urine, cough, and shortness of breath should be reported immediately. Patient advised to discontinue Arava treatment and consult with a physician prior to attempting conception. The patient will have to undergo a treatment to eliminate Arava from the body prior to conception.
Include Pregnancy/Lactation Warning?: No
Spironolactone Counseling: Patient advised regarding risks of diarrhea, abdominal pain, hyperkalemia, birth defects (for female patients), liver toxicity and renal toxicity. The patient may need blood work to monitor liver and kidney function and potassium levels while on therapy. The patient verbalized understanding of the proper use and possible adverse effects of spironolactone.  All of the patient's questions and concerns were addressed.
Cimzia Pregnancy And Lactation Text: This medication crosses the placenta but can be considered safe in certain situations. Cimzia may be excreted in breast milk.
Libtayo Counseling- I discussed with the patient the risks of Libtayo including but not limited to nausea, vomiting, diarrhea, and bone or muscle pain.  The patient verbalized understanding of the proper use and possible adverse effects of Libtayo.  All of the patient's questions and concerns were addressed.
Clofazimine Counseling:  I discussed with the patient the risks of clofazimine including but not limited to skin and eye pigmentation, liver damage, nausea/vomiting, gastrointestinal bleeding and allergy.
Rifampin Pregnancy And Lactation Text: This medication is Pregnancy Category C and it isn't know if it is safe during pregnancy. It is also excreted in breast milk and should not be used if you are breast feeding.
High Dose Vitamin A Counseling: Side effects reviewed, pt to contact office should one occur.
Rifampin Counseling: I discussed with the patient the risks of rifampin including but not limited to liver damage, kidney damage, red-orange body fluids, nausea/vomiting and severe allergy.
Cimetidine Counseling:  I discussed with the patient the risks of Cimetidine including but not limited to gynecomastia, headache, diarrhea, nausea, drowsiness, arrhythmias, pancreatitis, skin rashes, psychosis, bone marrow suppression and kidney toxicity.
5-Fu Pregnancy And Lactation Text: This medication is Pregnancy Category X and contraindicated in pregnancy and in women who may become pregnant. It is unknown if this medication is excreted in breast milk.
Isotretinoin Pregnancy And Lactation Text: This medication is Pregnancy Category X and is considered extremely dangerous during pregnancy. It is unknown if it is excreted in breast milk.
Arava Pregnancy And Lactation Text: This medication is Pregnancy Category X and is absolutely contraindicated during pregnancy. It is unknown if it is excreted in breast milk.
Minoxidil Pregnancy And Lactation Text: This medication has not been assigned a Pregnancy Risk Category but animal studies failed to show danger with the topical medication. It is unknown if the medication is excreted in breast milk.
Tazorac Pregnancy And Lactation Text: This medication is not safe during pregnancy. It is unknown if this medication is excreted in breast milk.
Rituxan Pregnancy And Lactation Text: This medication is Pregnancy Category C and it isn't know if it is safe during pregnancy. It is unknown if this medication is excreted in breast milk but similar antibodies are known to be excreted.
Azathioprine Counseling:  I discussed with the patient the risks of azathioprine including but not limited to myelosuppression, immunosuppression, hepatotoxicity, lymphoma, and infections.  The patient understands that monitoring is required including baseline LFTs, Creatinine, possible TPMP genotyping and weekly CBCs for the first month and then every 2 weeks thereafter.  The patient verbalized understanding of the proper use and possible adverse effects of azathioprine.  All of the patient's questions and concerns were addressed.
Siliq Counseling:  I discussed with the patient the risks of Siliq including but not limited to new or worsening depression, suicidal thoughts and behavior, immunosuppression, malignancy, posterior leukoencephalopathy syndrome, and serious infections.  The patient understands that monitoring is required including a PPD at baseline and must alert us or the primary physician if symptoms of infection or other concerning signs are noted. There is also a special program designed to monitor depression which is required with Siliq.
Cosentyx Counseling:  I discussed with the patient the risks of Cosentyx including but not limited to worsening of Crohn's disease, immunosuppression, allergic reactions and infections.  The patient understands that monitoring is required including a PPD at baseline and must alert us or the primary physician if symptoms of infection or other concerning signs are noted.
Mirvaso Counseling: Mirvaso is a topical medication which can decrease superficial blood flow where applied. Side effects are uncommon and include stinging, redness and allergic reactions.
Spironolactone Pregnancy And Lactation Text: This medication can cause feminization of the male fetus and should be avoided during pregnancy. The active metabolite is also found in breast milk.
Drysol Pregnancy And Lactation Text: This medication is considered safe during pregnancy and breast feeding.
High Dose Vitamin A Pregnancy And Lactation Text: High dose vitamin A therapy is contraindicated during pregnancy and breast feeding.
Doxepin Counseling:  Patient advised that the medication is sedating and not to drive a car after taking this medication. Patient informed of potential adverse effects including but not limited to dry mouth, urinary retention, and blurry vision.  The patient verbalized understanding of the proper use and possible adverse effects of doxepin.  All of the patient's questions and concerns were addressed.
Clofazimine Pregnancy And Lactation Text: This medication is Pregnancy Category C and isn't considered safe during pregnancy. It is excreted in breast milk.
Libtayo Pregnancy And Lactation Text: This medication is contraindicated in pregnancy and when breast feeding.
Drysol Counseling:  I discussed with the patient the risks of drysol/aluminum chloride including but not limited to skin rash, itching, irritation, burning.
Topical Clindamycin Counseling: Patient counseled that this medication may cause skin irritation or allergic reactions.  In the event of skin irritation, the patient was advised to reduce the amount of the drug applied or use it less frequently.   The patient verbalized understanding of the proper use and possible adverse effects of clindamycin.  All of the patient's questions and concerns were addressed.
Azathioprine Pregnancy And Lactation Text: This medication is Pregnancy Category D and isn't considered safe during pregnancy. It is unknown if this medication is excreted in breast milk.
Niacinamide Counseling: I recommended taking niacin or niacinamide, also know as vitamin B3, twice daily. Recent evidence suggests that taking vitamin B3 (500 mg twice daily) can reduce the risk of actinic keratoses and non-melanoma skin cancers. Side effects of vitamin B3 include flushing and headache.
Doxepin Pregnancy And Lactation Text: This medication is Pregnancy Category C and it isn't known if it is safe during pregnancy. It is also excreted in breast milk and breast feeding isn't recommended.
Elidel Counseling: Patient may experience a mild burning sensation during topical application. Elidel is not approved in children less than 2 years of age. There have been case reports of hematologic and skin malignancies in patients using topical calcineurin inhibitors although causality is questionable.
Colchicine Counseling:  Patient counseled regarding adverse effects including but not limited to stomach upset (nausea, vomiting, stomach pain, or diarrhea).  Patient instructed to limit alcohol consumption while taking this medication.  Colchicine may reduce blood counts especially with prolonged use.  The patient understands that monitoring of kidney function and blood counts may be required, especially at baseline. The patient verbalized understanding of the proper use and possible adverse effects of colchicine.  All of the patient's questions and concerns were addressed.
Topical Ketoconazole Counseling: Patient counseled that this medication may cause skin irritation or allergic reactions.  In the event of skin irritation, the patient was advised to reduce the amount of the drug applied or use it less frequently.   The patient verbalized understanding of the proper use and possible adverse effects of ketoconazole.  All of the patient's questions and concerns were addressed.
Sarecycline Pregnancy And Lactation Text: This medication is Pregnancy Category D and not consider safe during pregnancy. It is also excreted in breast milk.
Cellcept Counseling:  I discussed with the patient the risks of mycophenolate mofetil including but not limited to infection/immunosuppression, GI upset, hypokalemia, hypercholesterolemia, bone marrow suppression, lymphoproliferative disorders, malignancy, GI ulceration/bleed/perforation, colitis, interstitial lung disease, kidney failure, progressive multifocal leukoencephalopathy, and birth defects.  The patient understands that monitoring is required including a baseline creatinine and regular CBC testing. In addition, patient must alert us immediately if symptoms of infection or other concerning signs are noted.
Sarecycline Counseling: Patient advised regarding possible photosensitivity and discoloration of the teeth, skin, lips, tongue and gums.  Patient instructed to avoid sunlight, if possible.  When exposed to sunlight, patients should wear protective clothing, sunglasses, and sunscreen.  The patient was instructed to call the office immediately if the following severe adverse effects occur:  hearing changes, easy bruising/bleeding, severe headache, or vision changes.  The patient verbalized understanding of the proper use and possible adverse effects of sarecycline.  All of the patient's questions and concerns were addressed.
Topical Clindamycin Pregnancy And Lactation Text: This medication is Pregnancy Category B and is considered safe during pregnancy. It is unknown if it is excreted in breast milk.
Cosentyx Pregnancy And Lactation Text: This medication is Pregnancy Category B and is considered safe during pregnancy. It is unknown if this medication is excreted in breast milk.
SSKI Counseling:  I discussed with the patient the risks of SSKI including but not limited to thyroid abnormalities, metallic taste, GI upset, fever, headache, acne, arthralgias, paraesthesias, lymphadenopathy, easy bleeding, arrhythmias, and allergic reaction.
Siliq Pregnancy And Lactation Text: The risk during pregnancy and breastfeeding is uncertain with this medication.
Mirvaso Pregnancy And Lactation Text: This medication has not been assigned a Pregnancy Risk Category. It is unknown if the medication is excreted in breast milk.
Tetracycline Counseling: Patient counseled regarding possible photosensitivity and increased risk for sunburn.  Patient instructed to avoid sunlight, if possible.  When exposed to sunlight, patients should wear protective clothing, sunglasses, and sunscreen.  The patient was instructed to call the office immediately if the following severe adverse effects occur:  hearing changes, easy bruising/bleeding, severe headache, or vision changes.  The patient verbalized understanding of the proper use and possible adverse effects of tetracycline.  All of the patient's questions and concerns were addressed. Patient understands to avoid pregnancy while on therapy due to potential birth defects.
Cyclophosphamide Counseling:  I discussed with the patient the risks of cyclophosphamide including but not limited to hair loss, hormonal abnormalities, decreased fertility, abdominal pain, diarrhea, nausea and vomiting, bone marrow suppression and infection. The patient understands that monitoring is required while taking this medication.
Sski Pregnancy And Lactation Text: This medication is Pregnancy Category D and isn't considered safe during pregnancy. It is excreted in breast milk.
Opzelura Counseling:  I discussed with the patient the risks of Opzelura including but not limited to nasopharngitis, bronchitis, ear infection, eosinophila, hives, diarrhea, folliculitis, tonsillitis, and rhinorrhea.  Taken orally, this medication has been linked to serious infections; higher rate of mortality; malignancy and lymphoproliferative disorders; major adverse cardiovascular events; thrombosis; thrombocytopenia, anemia, and neutropenia; and lipid elevations.
Dupixent Counseling: I discussed with the patient the risks of dupilumab including but not limited to eye infection and irritation, cold sores, injection site reactions, worsening of asthma, allergic reactions and increased risk of parasitic infection.  Live vaccines should be avoided while taking dupilumab. Dupilumab will also interact with certain medications such as warfarin and cyclosporine. The patient understands that monitoring is required and they must alert us or the primary physician if symptoms of infection or other concerning signs are noted.
Simponi Counseling:  I discussed with the patient the risks of golimumab including but not limited to myelosuppression, immunosuppression, autoimmune hepatitis, demyelinating diseases, lymphoma, and serious infections.  The patient understands that monitoring is required including a PPD at baseline and must alert us or the primary physician if symptoms of infection or other concerning signs are noted.
Niacinamide Pregnancy And Lactation Text: These medications are considered safe during pregnancy.
Hydroxyzine Counseling: Patient advised that the medication is sedating and not to drive a car after taking this medication.  Patient informed of potential adverse effects including but not limited to dry mouth, urinary retention, and blurry vision.  The patient verbalized understanding of the proper use and possible adverse effects of hydroxyzine.  All of the patient's questions and concerns were addressed.
Cyclophosphamide Pregnancy And Lactation Text: This medication is Pregnancy Category D and it isn't considered safe during pregnancy. This medication is excreted in breast milk.
Dapsone Counseling: I discussed with the patient the risks of dapsone including but not limited to hemolytic anemia, agranulocytosis, rashes, methemoglobinemia, kidney failure, peripheral neuropathy, headaches, GI upset, and liver toxicity.  Patients who start dapsone require monitoring including baseline LFTs and weekly CBCs for the first month, then every month thereafter.  The patient verbalized understanding of the proper use and possible adverse effects of dapsone.  All of the patient's questions and concerns were addressed.
Topical Sulfur Applications Counseling: Topical Sulfur Counseling: Patient counseled that this medication may cause skin irritation or allergic reactions.  In the event of skin irritation, the patient was advised to reduce the amount of the drug applied or use it less frequently.   The patient verbalized understanding of the proper use and possible adverse effects of topical sulfur application.  All of the patient's questions and concerns were addressed.
Aklief counseling:  Patient advised to apply a pea-sized amount only at bedtime and wait 30 minutes after washing their face before applying.  If too drying, patient may add a non-comedogenic moisturizer.  The most commonly reported side effects including irritation, redness, scaling, dryness, stinging, burning, itching, and increased risk of sunburn.  The patient verbalized understanding of the proper use and possible adverse effects of retinoids.  All of the patient's questions and concerns were addressed.
Skyrizi Counseling: I discussed with the patient the risks of risankizumab-rzaa including but not limited to immunosuppression, and serious infections.  The patient understands that monitoring is required including a PPD at baseline and must alert us or the primary physician if symptoms of infection or other concerning signs are noted.
Picato Counseling:  I discussed with the patient the risks of Picato including but not limited to erythema, scaling, itching, weeping, crusting, and pain.
Opzelura Pregnancy And Lactation Text: There is insufficient data to evaluate drug-associated risk for major birth defects, miscarriage, or other adverse maternal or fetal outcomes.  There is a pregnancy registry that monitors pregnancy outcomes in pregnant persons exposed to the medication during pregnancy.  It is unknown if this medication is excreted in breast milk.  Do not breastfeed during treatment and for about 4 weeks after the last dose.
Thalidomide Counseling: I discussed with the patient the risks of thalidomide including but not limited to birth defects, anxiety, weakness, chest pain, dizziness, cough and severe allergy.
Hydroxyzine Pregnancy And Lactation Text: This medication is not safe during pregnancy and should not be taken. It is also excreted in breast milk and breast feeding isn't recommended.
Eucrisa Counseling: Patient may experience a mild burning sensation during topical application. Eucrisa is not approved in children less than 2 years of age.
Azithromycin Counseling:  I discussed with the patient the risks of azithromycin including but not limited to GI upset, allergic reaction, drug rash, diarrhea, and yeast infections.
Dupixent Pregnancy And Lactation Text: This medication likely crosses the placenta but the risk for the fetus is uncertain. This medication is excreted in breast milk.
Nsaids Counseling: NSAID Counseling: I discussed with the patient that NSAIDs should be taken with food. Prolonged use of NSAIDs can result in the development of stomach ulcers.  Patient advised to stop taking NSAIDs if abdominal pain occurs.  The patient verbalized understanding of the proper use and possible adverse effects of NSAIDs.  All of the patient's questions and concerns were addressed.
Odomzo Counseling- I discussed with the patient the risks of Odomzo including but not limited to nausea, vomiting, diarrhea, constipation, weight loss, changes in the sense of taste, decreased appetite, muscle spasms, and hair loss.  The patient verbalized understanding of the proper use and possible adverse effects of Odomzo.  All of the patient's questions and concerns were addressed.
Cyclosporine Counseling:  I discussed with the patient the risks of cyclosporine including but not limited to hypertension, gingival hyperplasia,myelosuppression, immunosuppression, liver damage, kidney damage, neurotoxicity, lymphoma, and serious infections. The patient understands that monitoring is required including baseline blood pressure, CBC, CMP, lipid panel and uric acid, and then 1-2 times monthly CMP and blood pressure.
Albendazole Counseling:  I discussed with the patient the risks of albendazole including but not limited to cytopenia, kidney damage, nausea/vomiting and severe allergy.  The patient understands that this medication is being used in an off-label manner.
Azithromycin Pregnancy And Lactation Text: This medication is considered safe during pregnancy and is also secreted in breast milk.
Nsaids Pregnancy And Lactation Text: These medications are considered safe up to 30 weeks gestation. It is excreted in breast milk.
Enbrel Counseling:  I discussed with the patient the risks of etanercept including but not limited to myelosuppression, immunosuppression, autoimmune hepatitis, demyelinating diseases, lymphoma, and infections.  The patient understands that monitoring is required including a PPD at baseline and must alert us or the primary physician if symptoms of infection or other concerning signs are noted.
Topical Sulfur Applications Pregnancy And Lactation Text: This medication is Pregnancy Category C and has an unknown safety profile during pregnancy. It is unknown if this topical medication is excreted in breast milk.
Dapsone Pregnancy And Lactation Text: This medication is Pregnancy Category C and is not considered safe during pregnancy or breast feeding.
Aklief Pregnancy And Lactation Text: It is unknown if this medication is safe to use during pregnancy.  It is unknown if this medication is excreted in breast milk.  Breastfeeding women should use the topical cream on the smallest area of the skin for the shortest time needed while breastfeeding.  Do not apply to nipple and areola.
Stelara Counseling:  I discussed with the patient the risks of ustekinumab including but not limited to immunosuppression, malignancy, posterior leukoencephalopathy syndrome, and serious infections.  The patient understands that monitoring is required including a PPD at baseline and must alert us or the primary physician if symptoms of infection or other concerning signs are noted.
Protopic Counseling: Patient may experience a mild burning sensation during topical application. Protopic is not approved in children less than 2 years of age. There have been case reports of hematologic and skin malignancies in patients using topical calcineurin inhibitors although causality is questionable.
Cyclosporine Pregnancy And Lactation Text: This medication is Pregnancy Category C and it isn't know if it is safe during pregnancy. This medication is excreted in breast milk.
Wartpeel Counseling:  I discussed with the patient the risks of Wartpeel including but not limited to erythema, scaling, itching, weeping, crusting, and pain.
Tranexamic Acid Pregnancy And Lactation Text: It is unknown if this medication is safe during pregnancy or breast feeding.
Humira Counseling:  I discussed with the patient the risks of adalimumab including but not limited to myelosuppression, immunosuppression, autoimmune hepatitis, demyelinating diseases, lymphoma, and serious infections.  The patient understands that monitoring is required including a PPD at baseline and must alert us or the primary physician if symptoms of infection or other concerning signs are noted.
Bactrim Pregnancy And Lactation Text: This medication is Pregnancy Category D and is known to cause fetal risk.  It is also excreted in breast milk.
Tranexamic Acid Counseling:  Patient advised of the small risk of bleeding problems with tranexamic acid. They were also instructed to call if they developed any nausea, vomiting or diarrhea. All of the patient's questions and concerns were addressed.
Bactrim Counseling:  I discussed with the patient the risks of sulfa antibiotics including but not limited to GI upset, allergic reaction, drug rash, diarrhea, dizziness, photosensitivity, and yeast infections.  Rarely, more serious reactions can occur including but not limited to aplastic anemia, agranulocytosis, methemoglobinemia, blood dyscrasias, liver or kidney failure, lung infiltrates or desquamative/blistering drug rashes.
Azelaic Acid Counseling: Patient counseled that medicine may cause skin irritation and to avoid applying near the eyes.  In the event of skin irritation, the patient was advised to reduce the amount of the drug applied or use it less frequently.   The patient verbalized understanding of the proper use and possible adverse effects of azelaic acid.  All of the patient's questions and concerns were addressed.
Fluconazole Counseling:  Patient counseled regarding adverse effects of fluconazole including but not limited to headache, diarrhea, nausea, upset stomach, liver function test abnormalities, taste disturbance, and stomach pain.  There is a rare possibility of liver failure that can occur when taking fluconazole.  The patient understands that monitoring of LFTs and kidney function test may be required, especially at baseline. The patient verbalized understanding of the proper use and possible adverse effects of fluconazole.  All of the patient's questions and concerns were addressed.
Dutasteride Male Counseling: Dustasteride Counseling:  I discussed with the patient the risks of use of dutasteride including but not limited to decreased libido, decreased ejaculate volume, and gynecomastia. Women who can become pregnant should not handle medication.  All of the patient's questions and concerns were addressed.
Hydroquinone Counseling:  Patient advised that medication may result in skin irritation, lightening (hypopigmentation), dryness, and burning.  In the event of skin irritation, the patient was advised to reduce the amount of the drug applied or use it less frequently.  Rarely, spots that are treated with hydroquinone can become darker (pseudoochronosis).  Should this occur, patient instructed to stop medication and call the office. The patient verbalized understanding of the proper use and possible adverse effects of hydroquinone.  All of the patient's questions and concerns were addressed.
Cephalexin Counseling: I counseled the patient regarding use of cephalexin as an antibiotic for prophylactic and/or therapeutic purposes. Cephalexin (commonly prescribed under brand name Keflex) is a cephalosporin antibiotic which is active against numerous classes of bacteria, including most skin bacteria. Side effects may include nausea, diarrhea, gastrointestinal upset, rash, hives, yeast infections, and in rare cases, hepatitis, kidney disease, seizures, fever, confusion, neurologic symptoms, and others. Patients with severe allergies to penicillin medications are cautioned that there is about a 10% incidence of cross-reactivity with cephalosporins. When possible, patients with penicillin allergies should use alternatives to cephalosporins for antibiotic therapy.
Valtrex Counseling: I discussed with the patient the risks of valacyclovir including but not limited to kidney damage, nausea, vomiting and severe allergy.  The patient understands that if the infection seems to be worsening or is not improving, they are to call.
Erivedge Counseling- I discussed with the patient the risks of Erivedge including but not limited to nausea, vomiting, diarrhea, constipation, weight loss, changes in the sense of taste, decreased appetite, muscle spasms, and hair loss.  The patient verbalized understanding of the proper use and possible adverse effects of Erivedge.  All of the patient's questions and concerns were addressed.
Benzoyl Peroxide Counseling: Patient counseled that medicine may cause skin irritation and bleach clothing.  In the event of skin irritation, the patient was advised to reduce the amount of the drug applied or use it less frequently.   The patient verbalized understanding of the proper use and possible adverse effects of benzoyl peroxide.  All of the patient's questions and concerns were addressed.
Griseofulvin Counseling:  I discussed with the patient the risks of griseofulvin including but not limited to photosensitivity, cytopenia, liver damage, nausea/vomiting and severe allergy.  The patient understands that this medication is best absorbed when taken with a fatty meal (e.g., ice cream or french fries).
Albendazole Pregnancy And Lactation Text: This medication is Pregnancy Category C and it isn't known if it is safe during pregnancy. It is also excreted in breast milk.
Dutasteride Pregnancy And Lactation Text: This medication is absolutely contraindicated in women, especially during pregnancy and breast feeding. Feminization of male fetuses is possible if taking while pregnant.
Methotrexate Counseling:  Patient counseled regarding adverse effects of methotrexate including but not limited to nausea, vomiting, abnormalities in liver function tests. Patients may develop mouth sores, rash, diarrhea, and abnormalities in blood counts. The patient understands that monitoring is required including LFT's and blood counts.  There is a rare possibility of scarring of the liver and lung problems that can occur when taking methotrexate. Persistent nausea, loss of appetite, pale stools, dark urine, cough, and shortness of breath should be reported immediately. Patient advised to discontinue methotrexate treatment at least three months before attempting to become pregnant.  I discussed the need for folate supplements while taking methotrexate.  These supplements can decrease side effects during methotrexate treatment. The patient verbalized understanding of the proper use and possible adverse effects of methotrexate.  All of the patient's questions and concerns were addressed.
Doxycycline Counseling:  Patient counseled regarding possible photosensitivity and increased risk for sunburn.  Patient instructed to avoid sunlight, if possible.  When exposed to sunlight, patients should wear protective clothing, sunglasses, and sunscreen.  The patient was instructed to call the office immediately if the following severe adverse effects occur:  hearing changes, easy bruising/bleeding, severe headache, or vision changes.  The patient verbalized understanding of the proper use and possible adverse effects of doxycycline.  All of the patient's questions and concerns were addressed.
Protopic Pregnancy And Lactation Text: This medication is Pregnancy Category C. It is unknown if this medication is excreted in breast milk when applied topically.
Ilumya Counseling: I discussed with the patient the risks of tildrakizumab including but not limited to immunosuppression, malignancy, posterior leukoencephalopathy syndrome, and serious infections.  The patient understands that monitoring is required including a PPD at baseline and must alert us or the primary physician if symptoms of infection or other concerning signs are noted.
Taltz Counseling: I discussed with the patient the risks of ixekizumab including but not limited to immunosuppression, serious infections, worsening of inflammatory bowel disease and drug reactions.  The patient understands that monitoring is required including a PPD at baseline and must alert us or the primary physician if symptoms of infection or other concerning signs are noted.
Valtrex Pregnancy And Lactation Text: this medication is Pregnancy Category B and is considered safe during pregnancy. This medication is not directly found in breast milk but it's metabolite acyclovir is present.
Qbrexza Counseling:  I discussed with the patient the risks of Qbrexza including but not limited to headache, mydriasis, blurred vision, dry eyes, nasal dryness, dry mouth, dry throat, dry skin, urinary hesitation, and constipation.  Local skin reactions including erythema, burning, stinging, and itching can also occur.
Cephalexin Pregnancy And Lactation Text: This medication is Pregnancy Category B and considered safe during pregnancy.  It is also excreted in breast milk but can be used safely for shorter doses.
Oral Minoxidil Pregnancy And Lactation Text: This medication should only be used when clearly needed if you are pregnant, attempting to become pregnant or breast feeding.
Ivermectin Counseling:  Patient instructed to take medication on an empty stomach with a full glass of water.  Patient informed of potential adverse effects including but not limited to nausea, diarrhea, dizziness, itching, and swelling of the extremities or lymph nodes.  The patient verbalized understanding of the proper use and possible adverse effects of ivermectin.  All of the patient's questions and concerns were addressed.
Oral Minoxidil Counseling- I discussed with the patient the risks of oral minoxidil including but not limited to shortness of breath, swelling of the feet or ankles, dizziness, lightheadedness, unwanted hair growth and allergic reaction.  The patient verbalized understanding of the proper use and possible adverse effects of oral minoxidil.  All of the patient's questions and concerns were addressed.
Doxycycline Pregnancy And Lactation Text: This medication is Pregnancy Category D and not consider safe during pregnancy. It is also excreted in breast milk but is considered safe for shorter treatment courses.
Methotrexate Pregnancy And Lactation Text: This medication is Pregnancy Category X and is known to cause fetal harm. This medication is excreted in breast milk.
Winlevi Counseling:  I discussed with the patient the risks of topical clascoterone including but not limited to erythema, scaling, itching, and stinging. Patient voiced their understanding.
Otezla Counseling: The side effects of Otezla were discussed with the patient, including but not limited to worsening or new depression, weight loss, diarrhea, nausea, upper respiratory tract infection, and headache. Patient instructed to call the office should any adverse effect occur.  The patient verbalized understanding of the proper use and possible adverse effects of Otezla.  All the patient's questions and concerns were addressed.
Finasteride Male Counseling: Finasteride Counseling:  I discussed with the patient the risks of use of finasteride including but not limited to decreased libido, decreased ejaculate volume, gynecomastia, and depression. Women should not handle medication.  All of the patient's questions and concerns were addressed.
Zyclara Counseling:  I discussed with the patient the risks of imiquimod including but not limited to erythema, scaling, itching, weeping, crusting, and pain.  Patient understands that the inflammatory response to imiquimod is variable from person to person and was educated regarded proper titration schedule.  If flu-like symptoms develop, patient knows to discontinue the medication and contact us.
Erythromycin Pregnancy And Lactation Text: This medication is Pregnancy Category B and is considered safe during pregnancy. It is also excreted in breast milk.
Itraconazole Counseling:  I discussed with the patient the risks of itraconazole including but not limited to liver damage, nausea/vomiting, neuropathy, and severe allergy.  The patient understands that this medication is best absorbed when taken with acidic beverages such as non-diet cola or ginger ale.  The patient understands that monitoring is required including baseline LFTs and repeat LFTs at intervals.  The patient understands that they are to contact us or the primary physician if concerning signs are noted.
Clindamycin Counseling: I counseled the patient regarding use of clindamycin as an antibiotic for prophylactic and/or therapeutic purposes. Clindamycin is active against numerous classes of bacteria, including skin bacteria. Side effects may include nausea, diarrhea, gastrointestinal upset, rash, hives, yeast infections, and in rare cases, colitis.
Prednisone Counseling:  I discussed with the patient the risks of prolonged use of prednisone including but not limited to weight gain, insomnia, osteoporosis, mood changes, diabetes, susceptibility to infection, glaucoma and high blood pressure.  In cases where prednisone use is prolonged, patients should be monitored with blood pressure checks, serum glucose levels and an eye exam.  Additionally, the patient may need to be placed on GI prophylaxis, PCP prophylaxis, and calcium and vitamin D supplementation and/or a bisphosphonate.  The patient verbalized understanding of the proper use and the possible adverse effects of prednisone.  All of the patient's questions and concerns were addressed.
Erythromycin Counseling:  I discussed with the patient the risks of erythromycin including but not limited to GI upset, allergic reaction, drug rash, diarrhea, increase in liver enzymes, and yeast infections.
Benzoyl Peroxide Pregnancy And Lactation Text: This medication is Pregnancy Category C. It is unknown if benzoyl peroxide is excreted in breast milk.
Griseofulvin Pregnancy And Lactation Text: This medication is Pregnancy Category X and is known to cause serious birth defects. It is unknown if this medication is excreted in breast milk but breast feeding should be avoided.
Winlevi Pregnancy And Lactation Text: This medication is considered safe during pregnancy and breastfeeding.
Qbrexza Pregnancy And Lactation Text: There is no available data on Qbrexza use in pregnant women.  There is no available data on Qbrexza use in lactation.
Infliximab Counseling:  I discussed with the patient the risks of infliximab including but not limited to myelosuppression, immunosuppression, autoimmune hepatitis, demyelinating diseases, lymphoma, and serious infections.  The patient understands that monitoring is required including a PPD at baseline and must alert us or the primary physician if symptoms of infection or other concerning signs are noted.
Clindamycin Pregnancy And Lactation Text: This medication can be used in pregnancy if certain situations. Clindamycin is also present in breast milk.
Otezla Pregnancy And Lactation Text: This medication is Pregnancy Category C and it isn't known if it is safe during pregnancy. It is unknown if it is excreted in breast milk.
Metronidazole Counseling:  I discussed with the patient the risks of metronidazole including but not limited to seizures, nausea/vomiting, a metallic taste in the mouth, nausea/vomiting and severe allergy.
Finasteride Pregnancy And Lactation Text: This medication is absolutely contraindicated during pregnancy. It is unknown if it is excreted in breast milk.
Rhofade Counseling: Rhofade is a topical medication which can decrease superficial blood flow where applied. Side effects are uncommon and include stinging, redness and allergic reactions.
Carac Counseling:  I discussed with the patient the risks of Carac including but not limited to erythema, scaling, itching, weeping, crusting, and pain.
Tremfya Counseling: I discussed with the patient the risks of guselkumab including but not limited to immunosuppression, serious infections, worsening of inflammatory bowel disease and drug reactions.  The patient understands that monitoring is required including a PPD at baseline and must alert us or the primary physician if symptoms of infection or other concerning signs are noted.
Gabapentin Counseling: I discussed with the patient the risks of gabapentin including but not limited to dizziness, somnolence, fatigue and ataxia.
Ketoconazole Counseling:   Patient counseled regarding improving absorption with orange juice.  Adverse effects include but are not limited to breast enlargement, headache, diarrhea, nausea, upset stomach, liver function test abnormalities, taste disturbance, and stomach pain.  There is a rare possibility of liver failure that can occur when taking ketoconazole. The patient understands that monitoring of LFTs may be required, especially at baseline. The patient verbalized understanding of the proper use and possible adverse effects of ketoconazole.  All of the patient's questions and concerns were addressed.
Calcipotriene Counseling:  I discussed with the patient the risks of calcipotriene including but not limited to erythema, scaling, itching, and irritation.
Solaraze Counseling:  I discussed with the patient the risks of Solaraze including but not limited to erythema, scaling, itching, weeping, crusting, and pain.
Acitretin Counseling:  I discussed with the patient the risks of acitretin including but not limited to hair loss, dry lips/skin/eyes, liver damage, hyperlipidemia, depression/suicidal ideation, photosensitivity.  Serious rare side effects can include but are not limited to pancreatitis, pseudotumor cerebri, bony changes, clot formation/stroke/heart attack.  Patient understands that alcohol is contraindicated since it can result in liver toxicity and significantly prolong the elimination of the drug by many years.
Metronidazole Pregnancy And Lactation Text: This medication is Pregnancy Category B and considered safe during pregnancy.  It is also excreted in breast milk.
Xeljanz Counseling: I discussed with the patient the risks of Xeljanz therapy including increased risk of infection, liver issues, headache, diarrhea, or cold symptoms. Live vaccines should be avoided. They were instructed to call if they have any problems.
Oxybutynin Counseling:  I discussed with the patient the risks of oxybutynin including but not limited to skin rash, drowsiness, dry mouth, difficulty urinating, and blurred vision.
Solaraze Pregnancy And Lactation Text: This medication is Pregnancy Category B and is considered safe. There is some data to suggest avoiding during the third trimester. It is unknown if this medication is excreted in breast milk.
Calcipotriene Pregnancy And Lactation Text: This medication has not been proven safe during pregnancy. It is unknown if this medication is excreted in breast milk.
Xelkenaz Pregnancy And Lactation Text: This medication is Pregnancy Category D and is not considered safe during pregnancy.  The risk during breast feeding is also uncertain.
Minocycline Counseling: Patient advised regarding possible photosensitivity and discoloration of the teeth, skin, lips, tongue and gums.  Patient instructed to avoid sunlight, if possible.  When exposed to sunlight, patients should wear protective clothing, sunglasses, and sunscreen.  The patient was instructed to call the office immediately if the following severe adverse effects occur:  hearing changes, easy bruising/bleeding, severe headache, or vision changes.  The patient verbalized understanding of the proper use and possible adverse effects of minocycline.  All of the patient's questions and concerns were addressed.
Acitretin Pregnancy And Lactation Text: This medication is Pregnancy Category X and should not be given to women who are pregnant or may become pregnant in the future. This medication is excreted in breast milk.
Propranolol Counseling:  I discussed with the patient the risks of propranolol including but not limited to low heart rate, low blood pressure, low blood sugar, restlessness and increased cold sensitivity. They should call the office if they experience any of these side effects.
Adbry Pregnancy And Lactation Text: It is unknown if this medication will adversely affect pregnancy or breast feeding.
Adbry Counseling: I discussed with the patient the risks of tralokinumab including but not limited to eye infection and irritation, cold sores, injection site reactions, worsening of asthma, allergic reactions and increased risk of parasitic infection.  Live vaccines should be avoided while taking tralokinumab. The patient understands that monitoring is required and they must alert us or the primary physician if symptoms of infection or other concerning signs are noted.
Olumiant Counseling: I discussed with the patient the risks of Olumiant therapy including but not limited to upper respiratory tract infections, shingles, cold sores, and nausea. Live vaccines should be avoided.  This medication has been linked to serious infections; higher rate of mortality; malignancy and lymphoproliferative disorders; major adverse cardiovascular events; thrombosis; gastrointestinal perforations; neutropenia; lymphopenia; anemia; liver enzyme elevations; and lipid elevations.
Imiquimod Counseling:  I discussed with the patient the risks of imiquimod including but not limited to erythema, scaling, itching, weeping, crusting, and pain.  Patient understands that the inflammatory response to imiquimod is variable from person to person and was educated regarded proper titration schedule.  If flu-like symptoms develop, patient knows to discontinue the medication and contact us.
Opioid Counseling: I discussed with the patient the potential side effects of opioids including but not limited to addiction, altered mental status, and depression. I stressed avoiding alcohol, benzodiazepines, muscle relaxants and sleep aids unless specifically okayed by a physician. The patient verbalized understanding of the proper use and possible adverse effects of opioids. All of the patient's questions and concerns were addressed. They were instructed to flush the remaining pills down the toilet if they did not need them for pain.
Ketoconazole Pregnancy And Lactation Text: This medication is Pregnancy Category C and it isn't know if it is safe during pregnancy. It is also excreted in breast milk and breast feeding isn't recommended.
Xolair Counseling:  Patient informed of potential adverse effects including but not limited to fever, muscle aches, rash and allergic reactions.  The patient verbalized understanding of the proper use and possible adverse effects of Xolair.  All of the patient's questions and concerns were addressed.
Topical Retinoid counseling:  Patient advised to apply a pea-sized amount only at bedtime and wait 30 minutes after washing their face before applying.  If too drying, patient may add a non-comedogenic moisturizer. The patient verbalized understanding of the proper use and possible adverse effects of retinoids.  All of the patient's questions and concerns were addressed.
Bexarotene Counseling:  I discussed with the patient the risks of bexarotene including but not limited to hair loss, dry lips/skin/eyes, liver abnormalities, hyperlipidemia, pancreatitis, depression/suicidal ideation, photosensitivity, drug rash/allergic reactions, hypothyroidism, anemia, leukopenia, infection, cataracts, and teratogenicity.  Patient understands that they will need regular blood tests to check lipid profile, liver function tests, white blood cell count, thyroid function tests and pregnancy test if applicable.
Opioid Pregnancy And Lactation Text: These medications can lead to premature delivery and should be avoided during pregnancy. These medications are also present in breast milk in small amounts.
Klisyri Counseling:  I discussed with the patient the risks of Klisyri including but not limited to erythema, scaling, itching, weeping, crusting, and pain.
Rinvoq Counseling: I discussed with the patient the risks of Rinvoq therapy including but not limited to upper respiratory tract infections, shingles, cold sores, bronchitis, nausea, cough, fever, acne, and headache. Live vaccines should be avoided.  This medication has been linked to serious infections; higher rate of mortality; malignancy and lymphoproliferative disorders; major adverse cardiovascular events; thrombosis; thrombocytopenia, anemia, and neutropenia; lipid elevations; liver enzyme elevations; and gastrointestinal perforations.
Propranolol Pregnancy And Lactation Text: This medication is Pregnancy Category C and it isn't known if it is safe during pregnancy. It is excreted in breast milk.
Olumiant Pregnancy And Lactation Text: Based on animal studies, Olumiant may cause embryo-fetal harm when administered to pregnant women.  The medication should not be used in pregnancy.  Breastfeeding is not recommended during treatment.
Terbinafine Counseling: Patient counseling regarding adverse effects of terbinafine including but not limited to headache, diarrhea, rash, upset stomach, liver function test abnormalities, itching, taste/smell disturbance, nausea, abdominal pain, and flatulence.  There is a rare possibility of liver failure that can occur when taking terbinafine.  The patient understands that a baseline LFT and kidney function test may be required. The patient verbalized understanding of the proper use and possible adverse effects of terbinafine.  All of the patient's questions and concerns were addressed.
Glycopyrrolate Counseling:  I discussed with the patient the risks of glycopyrrolate including but not limited to skin rash, drowsiness, dry mouth, difficulty urinating, and blurred vision.

## 2022-06-23 DIAGNOSIS — E78.2 MIXED DYSLIPIDEMIA: ICD-10-CM

## 2022-06-23 RX ORDER — ATORVASTATIN CALCIUM 40 MG/1
40 TABLET, FILM COATED ORAL
Qty: 90 TABLET | Refills: 0 | Status: SHIPPED | OUTPATIENT
Start: 2022-06-23 | End: 2022-09-19 | Stop reason: SDUPTHER

## 2022-06-29 ENCOUNTER — APPOINTMENT (RX ONLY)
Dept: URBAN - METROPOLITAN AREA CLINIC 4 | Facility: CLINIC | Age: 47
Setting detail: DERMATOLOGY
End: 2022-06-29

## 2022-06-29 PROBLEM — C43.59 MALIGNANT MELANOMA OF OTHER PART OF TRUNK: Status: ACTIVE | Noted: 2022-06-29

## 2022-06-29 PROCEDURE — 12034 INTMD RPR S/TR/EXT 7.6-12.5: CPT

## 2022-06-29 PROCEDURE — ? COUNSELING

## 2022-06-29 PROCEDURE — ? REFERRAL CORRESPONDENCE

## 2022-06-29 PROCEDURE — 11603 EXC TR-EXT MAL+MARG 2.1-3 CM: CPT

## 2022-06-29 PROCEDURE — ? EXCISION

## 2022-06-29 NOTE — PROCEDURE: EXCISION
Referring Physician (Optional): Sukh
Surgeon Performing The Repair (Optional): Arnulfo
Biopsy Photograph Reviewed: Yes
Previous Accession (Optional): [id number]
Size Of Lesion In Cm: 0.8
X Size Of Lesion In Cm (Optional): 0
Size Of Margin In Cm: 1
Anesthesia Volume In Cc: 9
Was An Eye Clamp Used?: No
Eye Clamp Note Details: An eye clamp was used during the procedure.
Excision Method: Fusiform
Saucerization Depth: dermis and superficial adipose tissue
Repair Type: Intermediate
Suturegard Retention Suture: 2-0 Nylon
Retention Suture Bite Size: 3 mm
Length To Time In Minutes Device Was In Place: 10
Intermediate / Complex Repair - Final Wound Length In Cm: 7.9
Undermining Type: Entire Wound
Debridement Text: The wound edges were debrided prior to proceeding with the closure to facilitate wound healing.
Helical Rim Text: The closure involved the helical rim.
Vermilion Border Text: The closure involved the vermilion border.
Nostril Rim Text: The closure involved the nostril rim.
Retention Suture Text: Retention sutures were placed to support the closure and prevent dehiscence.
Suture Removal: 14 days
Lab: [id number]
Lab Facility: 
Graft Donor Site Bandage (Optional-Leave Blank If You Don't Want In Note): Steri-strips and a pressure bandage were applied to the donor site.
Epidermal Closure Graft Donor Site (Optional): simple interrupted
Billing Type: Third-Party Bill
Excision Depth: adipose tissue
Scalpel Size: 15 blade
Anesthesia Type: 2% lidocaine without epinephrine and a 1:10 solution of 8.4% sodium bicarbonate
Hemostasis: Electrocautery
Estimated Blood Loss (Cc): minimal
Detail Level: Detailed
Anesthesia Type: 2% lidocaine with epinephrine and a 1:10 solution of 8.4% sodium bicarbonate
Deep Sutures: 4-0 Polysorb
Number Of Deep Sutures (Optional): 5
Epidermal Sutures: 4-0 Nylon
Epidermal Closure: running cuticular
Wound Care: Vaseline
Dressing: pressure dressing
Suturegard Intro: Intraoperative tissue expansion was performed, utilizing the SUTUREGARD device, in order to reduce wound tension.
Suturegard Body: The suture ends were repeatedly re-tightened and re-clamped to achieve the desired tissue expansion.
Hemigard Intro: Due to skin fragility and wound tension, it was decided to use HEMIGARD adhesive retention suture devices to permit a linear closure. The skin was cleaned and dried for a 6cm distance away from the wound. Excessive hair, if present, was removed to allow for adhesion.
Hemigard Postcare Instructions: The HEMIGARD strips are to remain completely dry for at least 5-7 days.
Positioning (Leave Blank If You Do Not Want): The patient was placed in a comfortable position exposing the surgical site.
Pre-Excision Curettage Text (Leave Blank If You Do Not Want): Prior to drawing the surgical margin the visible lesion was removed with electrodesiccation and curettage to clearly define the lesion size.
Complex Repair Preamble Text (Leave Blank If You Do Not Want): Extensive wide undermining was performed.
Intermediate Repair Preamble Text (Leave Blank If You Do Not Want): Undermining was performed with blunt dissection.
Curvilinear Excision Additional Text (Leave Blank If You Do Not Want): The margin was drawn around the clinically apparent lesion.  A curvilinear shape was then drawn on the skin incorporating the lesion and margins.  Incisions were then made along these lines to the appropriate tissue plane and the lesion was extirpated.
Fusiform Excision Additional Text (Leave Blank If You Do Not Want): The margin was drawn around the clinically apparent lesion.  A fusiform shape was then drawn on the skin incorporating the lesion and margins.  Incisions were then made along these lines to the appropriate tissue plane and the lesion was extirpated.
Elliptical Excision Additional Text (Leave Blank If You Do Not Want): The margin was drawn around the clinically apparent lesion.  An elliptical shape was then drawn on the skin incorporating the lesion and margins.  Incisions were then made along these lines to the appropriate tissue plane and the lesion was extirpated.
Saucerization Excision Additional Text (Leave Blank If You Do Not Want): The margin was drawn around the clinically apparent lesion.  Incisions were then made along these lines, in a tangential fashion, to the appropriate tissue plane and the lesion was extirpated.
Slit Excision Additional Text (Leave Blank If You Do Not Want): A linear line was drawn on the skin overlying the lesion. An incision was made slowly until the lesion was visualized.  Once visualized, the lesion was removed with blunt dissection.
Excisional Biopsy Additional Text (Leave Blank If You Do Not Want): The margin was drawn around the clinically apparent lesion. An elliptical shape was then drawn on the skin incorporating the lesion and margins.  Incisions were then made along these lines to the appropriate tissue plane and the lesion was extirpated.
Perilesional Excision Additional Text (Leave Blank If You Do Not Want): The margin was drawn around the clinically apparent lesion. Incisions were then made along these lines to the appropriate tissue plane and the lesion was extirpated.
Repair Performed By Another Provider Text (Leave Blank If You Do Not Want): After the tissue was excised the defect was repaired by another provider.
No Repair - Repaired With Adjacent Surgical Defect Text (Leave Blank If You Do Not Want): After the excision the defect was repaired concurrently with another surgical defect which was in close approximation.
Adjacent Tissue Transfer Text: The defect edges were debeveled with a #15 scalpel blade.  Given the location of the defect and the proximity to free margins an adjacent tissue transfer was deemed most appropriate.  Using a sterile surgical marker, an appropriate flap was drawn incorporating the defect and placing the expected incisions within the relaxed skin tension lines where possible.    The area thus outlined was incised deep to adipose tissue with a #15 scalpel blade.  The skin margins were undermined to an appropriate distance in all directions utilizing iris scissors.
Advancement Flap (Single) Text: The defect edges were debeveled with a #15 scalpel blade.  Given the location of the defect and the proximity to free margins a single advancement flap was deemed most appropriate.  Using a sterile surgical marker, an appropriate advancement flap was drawn incorporating the defect and placing the expected incisions within the relaxed skin tension lines where possible.    The area thus outlined was incised deep to adipose tissue with a #15 scalpel blade.  The skin margins were undermined to an appropriate distance in all directions utilizing iris scissors.
Advancement Flap (Double) Text: The defect edges were debeveled with a #15 scalpel blade.  Given the location of the defect and the proximity to free margins a double advancement flap was deemed most appropriate.  Using a sterile surgical marker, the appropriate advancement flaps were drawn incorporating the defect and placing the expected incisions within the relaxed skin tension lines where possible.    The area thus outlined was incised deep to adipose tissue with a #15 scalpel blade.  The skin margins were undermined to an appropriate distance in all directions utilizing iris scissors.
Burow's Advancement Flap Text: The defect edges were debeveled with a #15 scalpel blade.  Given the location of the defect and the proximity to free margins a Burow's advancement flap was deemed most appropriate.  Using a sterile surgical marker, the appropriate advancement flap was drawn incorporating the defect and placing the expected incisions within the relaxed skin tension lines where possible.    The area thus outlined was incised deep to adipose tissue with a #15 scalpel blade.  The skin margins were undermined to an appropriate distance in all directions utilizing iris scissors.
Chonodrocutaneous Helical Advancement Flap Text: The defect edges were debeveled with a #15 scalpel blade.  Given the location of the defect and the proximity to free margins a chondrocutaneous helical advancement flap was deemed most appropriate.  Using a sterile surgical marker, the appropriate advancement flap was drawn incorporating the defect and placing the expected incisions within the relaxed skin tension lines where possible.    The area thus outlined was incised deep to adipose tissue with a #15 scalpel blade.  The skin margins were undermined to an appropriate distance in all directions utilizing iris scissors.
Crescentic Advancement Flap Text: The defect edges were debeveled with a #15 scalpel blade.  Given the location of the defect and the proximity to free margins a crescentic advancement flap was deemed most appropriate.  Using a sterile surgical marker, the appropriate advancement flap was drawn incorporating the defect and placing the expected incisions within the relaxed skin tension lines where possible.    The area thus outlined was incised deep to adipose tissue with a #15 scalpel blade.  The skin margins were undermined to an appropriate distance in all directions utilizing iris scissors.
A-T Advancement Flap Text: The defect edges were debeveled with a #15 scalpel blade.  Given the location of the defect, shape of the defect and the proximity to free margins an A-T advancement flap was deemed most appropriate.  Using a sterile surgical marker, an appropriate advancement flap was drawn incorporating the defect and placing the expected incisions within the relaxed skin tension lines where possible.    The area thus outlined was incised deep to adipose tissue with a #15 scalpel blade.  The skin margins were undermined to an appropriate distance in all directions utilizing iris scissors.
O-T Advancement Flap Text: The defect edges were debeveled with a #15 scalpel blade.  Given the location of the defect, shape of the defect and the proximity to free margins an O-T advancement flap was deemed most appropriate.  Using a sterile surgical marker, an appropriate advancement flap was drawn incorporating the defect and placing the expected incisions within the relaxed skin tension lines where possible.    The area thus outlined was incised deep to adipose tissue with a #15 scalpel blade.  The skin margins were undermined to an appropriate distance in all directions utilizing iris scissors.
O-L Flap Text: The defect edges were debeveled with a #15 scalpel blade.  Given the location of the defect, shape of the defect and the proximity to free margins an O-L flap was deemed most appropriate.  Using a sterile surgical marker, an appropriate advancement flap was drawn incorporating the defect and placing the expected incisions within the relaxed skin tension lines where possible.    The area thus outlined was incised deep to adipose tissue with a #15 scalpel blade.  The skin margins were undermined to an appropriate distance in all directions utilizing iris scissors.
O-Z Flap Text: The defect edges were debeveled with a #15 scalpel blade.  Given the location of the defect, shape of the defect and the proximity to free margins an O-Z flap was deemed most appropriate.  Using a sterile surgical marker, an appropriate transposition flap was drawn incorporating the defect and placing the expected incisions within the relaxed skin tension lines where possible. The area thus outlined was incised deep to adipose tissue with a #15 scalpel blade.  The skin margins were undermined to an appropriate distance in all directions utilizing iris scissors.
Double O-Z Flap Text: The defect edges were debeveled with a #15 scalpel blade.  Given the location of the defect, shape of the defect and the proximity to free margins a Double O-Z flap was deemed most appropriate.  Using a sterile surgical marker, an appropriate transposition flap was drawn incorporating the defect and placing the expected incisions within the relaxed skin tension lines where possible. The area thus outlined was incised deep to adipose tissue with a #15 scalpel blade.  The skin margins were undermined to an appropriate distance in all directions utilizing iris scissors.
V-Y Flap Text: The defect edges were debeveled with a #15 scalpel blade.  Given the location of the defect, shape of the defect and the proximity to free margins a V-Y flap was deemed most appropriate.  Using a sterile surgical marker, an appropriate advancement flap was drawn incorporating the defect and placing the expected incisions within the relaxed skin tension lines where possible.    The area thus outlined was incised deep to adipose tissue with a #15 scalpel blade.  The skin margins were undermined to an appropriate distance in all directions utilizing iris scissors.
Advancement-Rotation Flap Text: The defect edges were debeveled with a #15 scalpel blade.  Given the location of the defect, shape of the defect and the proximity to free margins an advancement-rotation flap was deemed most appropriate.  Using a sterile surgical marker, an appropriate flap was drawn incorporating the defect and placing the expected incisions within the relaxed skin tension lines where possible. The area thus outlined was incised deep to adipose tissue with a #15 scalpel blade.  The skin margins were undermined to an appropriate distance in all directions utilizing iris scissors.
Mercedes Flap Text: The defect edges were debeveled with a #15 scalpel blade.  Given the location of the defect, shape of the defect and the proximity to free margins a Mercedes flap was deemed most appropriate.  Using a sterile surgical marker, an appropriate advancement flap was drawn incorporating the defect and placing the expected incisions within the relaxed skin tension lines where possible. The area thus outlined was incised deep to adipose tissue with a #15 scalpel blade.  The skin margins were undermined to an appropriate distance in all directions utilizing iris scissors.
Modified Advancement Flap Text: The defect edges were debeveled with a #15 scalpel blade.  Given the location of the defect, shape of the defect and the proximity to free margins a modified advancement flap was deemed most appropriate.  Using a sterile surgical marker, an appropriate advancement flap was drawn incorporating the defect and placing the expected incisions within the relaxed skin tension lines where possible.    The area thus outlined was incised deep to adipose tissue with a #15 scalpel blade.  The skin margins were undermined to an appropriate distance in all directions utilizing iris scissors.
Mucosal Advancement Flap Text: Given the location of the defect, shape of the defect and the proximity to free margins a mucosal advancement flap was deemed most appropriate. Incisions were made with a 15 blade scalpel in the appropriate fashion along the cutaneous vermilion border and the mucosal lip. The remaining actinically damaged mucosal tissue was excised.  The mucosal advancement flap was then elevated to the gingival sulcus with care taken to preserve the neurovascular structures and advanced into the primary defect. Care was taken to ensure that precise realignment of the vermilion border was achieved.
Peng Advancement Flap Text: The defect edges were debeveled with a #15 scalpel blade.  Given the location of the defect, shape of the defect and the proximity to free margins a Peng advancement flap was deemed most appropriate.  Using a sterile surgical marker, an appropriate advancement flap was drawn incorporating the defect and placing the expected incisions within the relaxed skin tension lines where possible. The area thus outlined was incised deep to adipose tissue with a #15 scalpel blade.  The skin margins were undermined to an appropriate distance in all directions utilizing iris scissors.
Hatchet Flap Text: The defect edges were debeveled with a #15 scalpel blade.  Given the location of the defect, shape of the defect and the proximity to free margins a hatchet flap was deemed most appropriate.  Using a sterile surgical marker, an appropriate hatchet flap was drawn incorporating the defect and placing the expected incisions within the relaxed skin tension lines where possible.    The area thus outlined was incised deep to adipose tissue with a #15 scalpel blade.  The skin margins were undermined to an appropriate distance in all directions utilizing iris scissors.
Rotation Flap Text: The defect edges were debeveled with a #15 scalpel blade.  Given the location of the defect, shape of the defect and the proximity to free margins a rotation flap was deemed most appropriate.  Using a sterile surgical marker, an appropriate rotation flap was drawn incorporating the defect and placing the expected incisions within the relaxed skin tension lines where possible.    The area thus outlined was incised deep to adipose tissue with a #15 scalpel blade.  The skin margins were undermined to an appropriate distance in all directions utilizing iris scissors.
Spiral Flap Text: The defect edges were debeveled with a #15 scalpel blade.  Given the location of the defect, shape of the defect and the proximity to free margins a spiral flap was deemed most appropriate.  Using a sterile surgical marker, an appropriate rotation flap was drawn incorporating the defect and placing the expected incisions within the relaxed skin tension lines where possible. The area thus outlined was incised deep to adipose tissue with a #15 scalpel blade.  The skin margins were undermined to an appropriate distance in all directions utilizing iris scissors.
Staged Advancement Flap Text: The defect edges were debeveled with a #15 scalpel blade.  Given the location of the defect, shape of the defect and the proximity to free margins a staged advancement flap was deemed most appropriate.  Using a sterile surgical marker, an appropriate advancement flap was drawn incorporating the defect and placing the expected incisions within the relaxed skin tension lines where possible. The area thus outlined was incised deep to adipose tissue with a #15 scalpel blade.  The skin margins were undermined to an appropriate distance in all directions utilizing iris scissors.
Star Wedge Flap Text: The defect edges were debeveled with a #15 scalpel blade.  Given the location of the defect, shape of the defect and the proximity to free margins a star wedge flap was deemed most appropriate.  Using a sterile surgical marker, an appropriate rotation flap was drawn incorporating the defect and placing the expected incisions within the relaxed skin tension lines where possible. The area thus outlined was incised deep to adipose tissue with a #15 scalpel blade.  The skin margins were undermined to an appropriate distance in all directions utilizing iris scissors.
Transposition Flap Text: The defect edges were debeveled with a #15 scalpel blade.  Given the location of the defect and the proximity to free margins a transposition flap was deemed most appropriate.  Using a sterile surgical marker, an appropriate transposition flap was drawn incorporating the defect.    The area thus outlined was incised deep to adipose tissue with a #15 scalpel blade.  The skin margins were undermined to an appropriate distance in all directions utilizing iris scissors.
Muscle Hinge Flap Text: The defect edges were debeveled with a #15 scalpel blade.  Given the size, depth and location of the defect and the proximity to free margins a muscle hinge flap was deemed most appropriate.  Using a sterile surgical marker, an appropriate hinge flap was drawn incorporating the defect. The area thus outlined was incised with a #15 scalpel blade.  The skin margins were undermined to an appropriate distance in all directions utilizing iris scissors.
Mustarde Flap Text: The defect edges were debeveled with a #15 scalpel blade.  Given the size, depth and location of the defect and the proximity to free margins a Mustarde flap was deemed most appropriate.  Using a sterile surgical marker, an appropriate flap was drawn incorporating the defect. The area thus outlined was incised with a #15 scalpel blade.  The skin margins were undermined to an appropriate distance in all directions utilizing iris scissors.
Nasal Turnover Hinge Flap Text: The defect edges were debeveled with a #15 scalpel blade.  Given the size, depth, location of the defect and the defect being full thickness a nasal turnover hinge flap was deemed most appropriate.  Using a sterile surgical marker, an appropriate hinge flap was drawn incorporating the defect. The area thus outlined was incised with a #15 scalpel blade. The flap was designed to recreate the nasal mucosal lining and the alar rim. The skin margins were undermined to an appropriate distance in all directions utilizing iris scissors.
Nasalis-Muscle-Based Myocutaneous Island Pedicle Flap Text: Using a #15 blade, an incision was made around the donor flap to the level of the nasalis muscle. Wide lateral undermining was then performed in both the subcutaneous plane above the nasalis muscle, and in a submuscular plane just above periosteum. This allowed the formation of a free nasalis muscle axial pedicle (based on the angular artery) which was still attached to the actual cutaneous flap, increasing its mobility and vascular viability. Hemostasis was obtained with pinpoint electrocoagulation. The flap was mobilized into position and the pivotal anchor points positioned and stabilized with buried interrupted sutures. Subcutaneous and dermal tissues were closed in a multilayered fashion with sutures. Tissue redundancies were excised, and the epidermal edges were apposed without significant tension and sutured with sutures.
Orbicularis Oris Muscle Flap Text: The defect edges were debeveled with a #15 scalpel blade.  Given that the defect affected the competency of the oral sphincter an orbicularis oris muscle flap was deemed most appropriate to restore this competency and normal muscle function.  Using a sterile surgical marker, an appropriate flap was drawn incorporating the defect. The area thus outlined was incised with a #15 scalpel blade.
Melolabial Transposition Flap Text: The defect edges were debeveled with a #15 scalpel blade.  Given the location of the defect and the proximity to free margins a melolabial flap was deemed most appropriate.  Using a sterile surgical marker, an appropriate melolabial transposition flap was drawn incorporating the defect.    The area thus outlined was incised deep to adipose tissue with a #15 scalpel blade.  The skin margins were undermined to an appropriate distance in all directions utilizing iris scissors.
Rhombic Flap Text: The defect edges were debeveled with a #15 scalpel blade.  Given the location of the defect and the proximity to free margins a rhombic flap was deemed most appropriate.  Using a sterile surgical marker, an appropriate rhombic flap was drawn incorporating the defect.    The area thus outlined was incised deep to adipose tissue with a #15 scalpel blade.  The skin margins were undermined to an appropriate distance in all directions utilizing iris scissors.
Rhomboid Transposition Flap Text: The defect edges were debeveled with a #15 scalpel blade.  Given the location of the defect and the proximity to free margins a rhomboid transposition flap was deemed most appropriate.  Using a sterile surgical marker, an appropriate rhomboid flap was drawn incorporating the defect.    The area thus outlined was incised deep to adipose tissue with a #15 scalpel blade.  The skin margins were undermined to an appropriate distance in all directions utilizing iris scissors.
Bi-Rhombic Flap Text: The defect edges were debeveled with a #15 scalpel blade.  Given the location of the defect and the proximity to free margins a bi-rhombic flap was deemed most appropriate.  Using a sterile surgical marker, an appropriate rhombic flap was drawn incorporating the defect. The area thus outlined was incised deep to adipose tissue with a #15 scalpel blade.  The skin margins were undermined to an appropriate distance in all directions utilizing iris scissors.
Helical Rim Advancement Flap Text: The defect edges were debeveled with a #15 blade scalpel.  Given the location of the defect and the proximity to free margins (helical rim) a double helical rim advancement flap was deemed most appropriate.  Using a sterile surgical marker, the appropriate advancement flaps were drawn incorporating the defect and placing the expected incisions between the helical rim and antihelix where possible.  The area thus outlined was incised through and through with a #15 scalpel blade.  With a skin hook and iris scissors, the flaps were gently and sharply undermined and freed up.
Bilateral Helical Rim Advancement Flap Text: The defect edges were debeveled with a #15 blade scalpel.  Given the location of the defect and the proximity to free margins (helical rim) a bilateral helical rim advancement flap was deemed most appropriate.  Using a sterile surgical marker, the appropriate advancement flaps were drawn incorporating the defect and placing the expected incisions between the helical rim and antihelix where possible.  The area thus outlined was incised through and through with a #15 scalpel blade.  With a skin hook and iris scissors, the flaps were gently and sharply undermined and freed up.
Ear Star Wedge Flap Text: The defect edges were debeveled with a #15 blade scalpel.  Given the location of the defect and the proximity to free margins (helical rim) an ear star wedge flap was deemed most appropriate.  Using a sterile surgical marker, the appropriate flap was drawn incorporating the defect and placing the expected incisions between the helical rim and antihelix where possible.  The area thus outlined was incised through and through with a #15 scalpel blade.
Banner Transposition Flap Text: The defect edges were debeveled with a #15 scalpel blade.  Given the location of the defect and the proximity to free margins a Banner transposition flap was deemed most appropriate.  Using a sterile surgical marker, an appropriate flap drawn around the defect. The area thus outlined was incised deep to adipose tissue with a #15 scalpel blade.  The skin margins were undermined to an appropriate distance in all directions utilizing iris scissors.
Bilobed Flap Text: The defect edges were debeveled with a #15 scalpel blade.  Given the location of the defect and the proximity to free margins a bilobe flap was deemed most appropriate.  Using a sterile surgical marker, an appropriate bilobe flap drawn around the defect.    The area thus outlined was incised deep to adipose tissue with a #15 scalpel blade.  The skin margins were undermined to an appropriate distance in all directions utilizing iris scissors.
Bilobed Transposition Flap Text: The defect edges were debeveled with a #15 scalpel blade.  Given the location of the defect and the proximity to free margins a bilobed transposition flap was deemed most appropriate.  Using a sterile surgical marker, an appropriate bilobe flap drawn around the defect.    The area thus outlined was incised deep to adipose tissue with a #15 scalpel blade.  The skin margins were undermined to an appropriate distance in all directions utilizing iris scissors.
Trilobed Flap Text: The defect edges were debeveled with a #15 scalpel blade.  Given the location of the defect and the proximity to free margins a trilobed flap was deemed most appropriate.  Using a sterile surgical marker, an appropriate trilobed flap drawn around the defect.    The area thus outlined was incised deep to adipose tissue with a #15 scalpel blade.  The skin margins were undermined to an appropriate distance in all directions utilizing iris scissors.
Dorsal Nasal Flap Text: The defect edges were debeveled with a #15 scalpel blade.  Given the location of the defect and the proximity to free margins a dorsal nasal flap was deemed most appropriate.  Using a sterile surgical marker, an appropriate dorsal nasal flap was drawn around the defect.    The area thus outlined was incised deep to adipose tissue with a #15 scalpel blade.  The skin margins were undermined to an appropriate distance in all directions utilizing iris scissors.
Island Pedicle Flap Text: The defect edges were debeveled with a #15 scalpel blade.  Given the location of the defect, shape of the defect and the proximity to free margins an island pedicle advancement flap was deemed most appropriate.  Using a sterile surgical marker, an appropriate advancement flap was drawn incorporating the defect, outlining the appropriate donor tissue and placing the expected incisions within the relaxed skin tension lines where possible.    The area thus outlined was incised deep to adipose tissue with a #15 scalpel blade.  The skin margins were undermined to an appropriate distance in all directions around the primary defect and laterally outward around the island pedicle utilizing iris scissors.  There was minimal undermining beneath the pedicle flap.
Island Pedicle Flap With Canthal Suspension Text: The defect edges were debeveled with a #15 scalpel blade.  Given the location of the defect, shape of the defect and the proximity to free margins an island pedicle advancement flap was deemed most appropriate.  Using a sterile surgical marker, an appropriate advancement flap was drawn incorporating the defect, outlining the appropriate donor tissue and placing the expected incisions within the relaxed skin tension lines where possible. The area thus outlined was incised deep to adipose tissue with a #15 scalpel blade.  The skin margins were undermined to an appropriate distance in all directions around the primary defect and laterally outward around the island pedicle utilizing iris scissors.  There was minimal undermining beneath the pedicle flap. A suspension suture was placed in the canthal tendon to prevent tension and prevent ectropion.
Alar Island Pedicle Flap Text: The defect edges were debeveled with a #15 scalpel blade.  Given the location of the defect, shape of the defect and the proximity to the alar rim an island pedicle advancement flap was deemed most appropriate.  Using a sterile surgical marker, an appropriate advancement flap was drawn incorporating the defect, outlining the appropriate donor tissue and placing the expected incisions within the nasal ala running parallel to the alar rim. The area thus outlined was incised with a #15 scalpel blade.  The skin margins were undermined minimally to an appropriate distance in all directions around the primary defect and laterally outward around the island pedicle utilizing iris scissors.  There was minimal undermining beneath the pedicle flap.
Double Island Pedicle Flap Text: The defect edges were debeveled with a #15 scalpel blade.  Given the location of the defect, shape of the defect and the proximity to free margins a double island pedicle advancement flap was deemed most appropriate.  Using a sterile surgical marker, an appropriate advancement flap was drawn incorporating the defect, outlining the appropriate donor tissue and placing the expected incisions within the relaxed skin tension lines where possible.    The area thus outlined was incised deep to adipose tissue with a #15 scalpel blade.  The skin margins were undermined to an appropriate distance in all directions around the primary defect and laterally outward around the island pedicle utilizing iris scissors.  There was minimal undermining beneath the pedicle flap.
Island Pedicle Flap-Requiring Vessel Identification Text: The defect edges were debeveled with a #15 scalpel blade.  Given the location of the defect, shape of the defect and the proximity to free margins an island pedicle advancement flap was deemed most appropriate.  Using a sterile surgical marker, an appropriate advancement flap was drawn, based on the axial vessel mentioned above, incorporating the defect, outlining the appropriate donor tissue and placing the expected incisions within the relaxed skin tension lines where possible.    The area thus outlined was incised deep to adipose tissue with a #15 scalpel blade.  The skin margins were undermined to an appropriate distance in all directions around the primary defect and laterally outward around the island pedicle utilizing iris scissors.  There was minimal undermining beneath the pedicle flap.
Keystone Flap Text: The defect edges were debeveled with a #15 scalpel blade.  Given the location of the defect, shape of the defect a keystone flap was deemed most appropriate.  Using a sterile surgical marker, an appropriate keystone flap was drawn incorporating the defect, outlining the appropriate donor tissue and placing the expected incisions within the relaxed skin tension lines where possible. The area thus outlined was incised deep to adipose tissue with a #15 scalpel blade.  The skin margins were undermined to an appropriate distance in all directions around the primary defect and laterally outward around the flap utilizing iris scissors.
O-T Plasty Text: The defect edges were debeveled with a #15 scalpel blade.  Given the location of the defect, shape of the defect and the proximity to free margins an O-T plasty was deemed most appropriate.  Using a sterile surgical marker, an appropriate O-T plasty was drawn incorporating the defect and placing the expected incisions within the relaxed skin tension lines where possible.    The area thus outlined was incised deep to adipose tissue with a #15 scalpel blade.  The skin margins were undermined to an appropriate distance in all directions utilizing iris scissors.
O-Z Plasty Text: The defect edges were debeveled with a #15 scalpel blade.  Given the location of the defect, shape of the defect and the proximity to free margins an O-Z plasty (double transposition flap) was deemed most appropriate.  Using a sterile surgical marker, the appropriate transposition flaps were drawn incorporating the defect and placing the expected incisions within the relaxed skin tension lines where possible.    The area thus outlined was incised deep to adipose tissue with a #15 scalpel blade.  The skin margins were undermined to an appropriate distance in all directions utilizing iris scissors.  Hemostasis was achieved with electrocautery.  The flaps were then transposed into place, one clockwise and the other counterclockwise, and anchored with interrupted buried subcutaneous sutures.
Double O-Z Plasty Text: The defect edges were debeveled with a #15 scalpel blade.  Given the location of the defect, shape of the defect and the proximity to free margins a Double O-Z plasty (double transposition flap) was deemed most appropriate.  Using a sterile surgical marker, the appropriate transposition flaps were drawn incorporating the defect and placing the expected incisions within the relaxed skin tension lines where possible. The area thus outlined was incised deep to adipose tissue with a #15 scalpel blade.  The skin margins were undermined to an appropriate distance in all directions utilizing iris scissors.  Hemostasis was achieved with electrocautery.  The flaps were then transposed into place, one clockwise and the other counterclockwise, and anchored with interrupted buried subcutaneous sutures.
V-Y Plasty Text: The defect edges were debeveled with a #15 scalpel blade.  Given the location of the defect, shape of the defect and the proximity to free margins an V-Y advancement flap was deemed most appropriate.  Using a sterile surgical marker, an appropriate advancement flap was drawn incorporating the defect and placing the expected incisions within the relaxed skin tension lines where possible.    The area thus outlined was incised deep to adipose tissue with a #15 scalpel blade.  The skin margins were undermined to an appropriate distance in all directions utilizing iris scissors.
H Plasty Text: Given the location of the defect, shape of the defect and the proximity to free margins a H-plasty was deemed most appropriate for repair.  Using a sterile surgical marker, the appropriate advancement arms of the H-plasty were drawn incorporating the defect and placing the expected incisions within the relaxed skin tension lines where possible. The area thus outlined was incised deep to adipose tissue with a #15 scalpel blade. The skin margins were undermined to an appropriate distance in all directions utilizing iris scissors.  The opposing advancement arms were then advanced into place in opposite direction and anchored with interrupted buried subcutaneous sutures.
W Plasty Text: The lesion was extirpated to the level of the fat with a #15 scalpel blade.  Given the location of the defect, shape of the defect and the proximity to free margins a W-plasty was deemed most appropriate for repair.  Using a sterile surgical marker, the appropriate transposition arms of the W-plasty were drawn incorporating the defect and placing the expected incisions within the relaxed skin tension lines where possible.    The area thus outlined was incised deep to adipose tissue with a #15 scalpel blade.  The skin margins were undermined to an appropriate distance in all directions utilizing iris scissors.  The opposing transposition arms were then transposed into place in opposite direction and anchored with interrupted buried subcutaneous sutures.
Z Plasty Text: The lesion was extirpated to the level of the fat with a #15 scalpel blade.  Given the location of the defect, shape of the defect and the proximity to free margins a Z-plasty was deemed most appropriate for repair.  Using a sterile surgical marker, the appropriate transposition arms of the Z-plasty were drawn incorporating the defect and placing the expected incisions within the relaxed skin tension lines where possible.    The area thus outlined was incised deep to adipose tissue with a #15 scalpel blade.  The skin margins were undermined to an appropriate distance in all directions utilizing iris scissors.  The opposing transposition arms were then transposed into place in opposite direction and anchored with interrupted buried subcutaneous sutures.
Zygomaticofacial Flap Text: Given the location of the defect, shape of the defect and the proximity to free margins a zygomaticofacial flap was deemed most appropriate for repair.  Using a sterile surgical marker, the appropriate flap was drawn incorporating the defect and placing the expected incisions within the relaxed skin tension lines where possible. The area thus outlined was incised deep to adipose tissue with a #15 scalpel blade with preservation of a vascular pedicle.  The skin margins were undermined to an appropriate distance in all directions utilizing iris scissors.  The flap was then placed into the defect and anchored with interrupted buried subcutaneous sutures.
Cheek Interpolation Flap Text: A decision was made to reconstruct the defect utilizing an interpolation axial flap and a staged reconstruction.  A telfa template was made of the defect.  This telfa template was then used to outline the Cheek Interpolation flap.  The donor area for the pedicle flap was then injected with anesthesia.  The flap was excised through the skin and subcutaneous tissue down to the layer of the underlying musculature.  The interpolation flap was carefully excised within this deep plane to maintain its blood supply.  The edges of the donor site were undermined.   The donor site was closed in a primary fashion.  The pedicle was then rotated into position and sutured.  Once the tube was sutured into place, adequate blood supply was confirmed with blanching and refill.  The pedicle was then wrapped with xeroform gauze and dressed appropriately with a telfa and gauze bandage to ensure continued blood supply and protect the attached pedicle.
Cheek-To-Nose Interpolation Flap Text: A decision was made to reconstruct the defect utilizing an interpolation axial flap and a staged reconstruction.  A telfa template was made of the defect.  This telfa template was then used to outline the Cheek-To-Nose Interpolation flap.  The donor area for the pedicle flap was then injected with anesthesia.  The flap was excised through the skin and subcutaneous tissue down to the layer of the underlying musculature.  The interpolation flap was carefully excised within this deep plane to maintain its blood supply.  The edges of the donor site were undermined.   The donor site was closed in a primary fashion.  The pedicle was then rotated into position and sutured.  Once the tube was sutured into place, adequate blood supply was confirmed with blanching and refill.  The pedicle was then wrapped with xeroform gauze and dressed appropriately with a telfa and gauze bandage to ensure continued blood supply and protect the attached pedicle.
Interpolation Flap Text: A decision was made to reconstruct the defect utilizing an interpolation axial flap and a staged reconstruction.  A telfa template was made of the defect.  This telfa template was then used to outline the interpolation flap.  The donor area for the pedicle flap was then injected with anesthesia.  The flap was excised through the skin and subcutaneous tissue down to the layer of the underlying musculature.  The interpolation flap was carefully excised within this deep plane to maintain its blood supply.  The edges of the donor site were undermined.   The donor site was closed in a primary fashion.  The pedicle was then rotated into position and sutured.  Once the tube was sutured into place, adequate blood supply was confirmed with blanching and refill.  The pedicle was then wrapped with xeroform gauze and dressed appropriately with a telfa and gauze bandage to ensure continued blood supply and protect the attached pedicle.
Melolabial Interpolation Flap Text: A decision was made to reconstruct the defect utilizing an interpolation axial flap and a staged reconstruction.  A telfa template was made of the defect.  This telfa template was then used to outline the melolabial interpolation flap.  The donor area for the pedicle flap was then injected with anesthesia.  The flap was excised through the skin and subcutaneous tissue down to the layer of the underlying musculature.  The pedicle flap was carefully excised within this deep plane to maintain its blood supply.  The edges of the donor site were undermined.   The donor site was closed in a primary fashion.  The pedicle was then rotated into position and sutured.  Once the tube was sutured into place, adequate blood supply was confirmed with blanching and refill.  The pedicle was then wrapped with xeroform gauze and dressed appropriately with a telfa and gauze bandage to ensure continued blood supply and protect the attached pedicle.
Mastoid Interpolation Flap Text: A decision was made to reconstruct the defect utilizing an interpolation axial flap and a staged reconstruction.  A telfa template was made of the defect.  This telfa template was then used to outline the mastoid interpolation flap.  The donor area for the pedicle flap was then injected with anesthesia.  The flap was excised through the skin and subcutaneous tissue down to the layer of the underlying musculature.  The pedicle flap was carefully excised within this deep plane to maintain its blood supply.  The edges of the donor site were undermined.   The donor site was closed in a primary fashion.  The pedicle was then rotated into position and sutured.  Once the tube was sutured into place, adequate blood supply was confirmed with blanching and refill.  The pedicle was then wrapped with xeroform gauze and dressed appropriately with a telfa and gauze bandage to ensure continued blood supply and protect the attached pedicle.
Posterior Auricular Interpolation Flap Text: A decision was made to reconstruct the defect utilizing an interpolation axial flap and a staged reconstruction.  A telfa template was made of the defect.  This telfa template was then used to outline the posterior auricular interpolation flap.  The donor area for the pedicle flap was then injected with anesthesia.  The flap was excised through the skin and subcutaneous tissue down to the layer of the underlying musculature.  The pedicle flap was carefully excised within this deep plane to maintain its blood supply.  The edges of the donor site were undermined.   The donor site was closed in a primary fashion.  The pedicle was then rotated into position and sutured.  Once the tube was sutured into place, adequate blood supply was confirmed with blanching and refill.  The pedicle was then wrapped with xeroform gauze and dressed appropriately with a telfa and gauze bandage to ensure continued blood supply and protect the attached pedicle.
Paramedian Forehead Flap Text: A decision was made to reconstruct the defect utilizing an interpolation axial flap and a staged reconstruction.  A telfa template was made of the defect.  This telfa template was then used to outline the paramedian forehead pedicle flap.  The donor area for the pedicle flap was then injected with anesthesia.  The flap was excised through the skin and subcutaneous tissue down to the layer of the underlying musculature.  The pedicle flap was carefully excised within this deep plane to maintain its blood supply.  The edges of the donor site were undermined.   The donor site was closed in a primary fashion.  The pedicle was then rotated into position and sutured.  Once the tube was sutured into place, adequate blood supply was confirmed with blanching and refill.  The pedicle was then wrapped with xeroform gauze and dressed appropriately with a telfa and gauze bandage to ensure continued blood supply and protect the attached pedicle.
Abbe Flap (Upper To Lower Lip) Text: The defect of the lower lip was assessed and measured.  Given the location and size of the defect, an Abbe flap was deemed most appropriate.  Using a sterile surgical marker, an appropriate Abbe flap was measured and drawn on the upper lip. Local anesthesia was then infiltrated.  A scalpel was then used to incise the upper lip through and through the skin, vermilion, muscle and mucosa, leaving the flap pedicled on the opposite side.  The flap was then rotated and transferred to the lower lip defect.  The flap was then sutured into place with a three layer technique, closing the orbicularis oris muscle layer with subcutaneous buried sutures, followed by a mucosal layer and an epidermal layer.
Abbe Flap (Lower To Upper Lip) Text: The defect of the upper lip was assessed and measured.  Given the location and size of the defect, an Abbe flap was deemed most appropriate.  Using a sterile surgical marker, an appropriate Abbe flap was measured and drawn on the lower lip. Local anesthesia was then infiltrated. A scalpel was then used to incise the upper lip through and through the skin, vermilion, muscle and mucosa, leaving the flap pedicled on the opposite side.  The flap was then rotated and transferred to the lower lip defect.  The flap was then sutured into place with a three layer technique, closing the orbicularis oris muscle layer with subcutaneous buried sutures, followed by a mucosal layer and an epidermal layer.
Estlander Flap (Upper To Lower Lip) Text: The defect of the lower lip was assessed and measured.  Given the location and size of the defect, an Estlander flap was deemed most appropriate.  Using a sterile surgical marker, an appropriate Estlander flap was measured and drawn on the upper lip. Local anesthesia was then infiltrated. A scalpel was then used to incise the lateral aspect of the flap, through skin, muscle and mucosa, leaving the flap pedicled medially.  The flap was then rotated and positioned to fill the lower lip defect.  The flap was then sutured into place with a three layer technique, closing the orbicularis oris muscle layer with subcutaneous buried sutures, followed by a mucosal layer and an epidermal layer.
Lip Wedge Excision Repair Text: Given the location of the defect and the proximity to free margins a full thickness wedge repair was deemed most appropriate.  Using a sterile surgical marker, the appropriate repair was drawn incorporating the defect and placing the expected incisions perpendicular to the vermilion border.  The vermilion border was also meticulously outlined to ensure appropriate reapproximation during the repair.  The area thus outlined was incised through and through with a #15 scalpel blade.  The muscularis and dermis were reaproximated with deep sutures following hemostasis. Care was taken to realign the vermilion border before proceeding with the superficial closure.  Once the vermilion was realigned the superfical and mucosal closure was finished.
Ftsg Text: The defect edges were debeveled with a #15 scalpel blade.  Given the location of the defect, shape of the defect and the proximity to free margins a full thickness skin graft was deemed most appropriate.  Using a sterile surgical marker, the primary defect shape was transferred to the donor site. The area thus outlined was incised deep to adipose tissue with a #15 scalpel blade.  The harvested graft was then trimmed of adipose tissue until only dermis and epidermis was left.  The skin margins of the secondary defect were undermined to an appropriate distance in all directions utilizing iris scissors.  The secondary defect was closed with interrupted buried subcutaneous sutures.  The skin edges were then re-apposed with running  sutures.  The skin graft was then placed in the primary defect and oriented appropriately.
Split-Thickness Skin Graft Text: The defect edges were debeveled with a #15 scalpel blade.  Given the location of the defect, shape of the defect and the proximity to free margins a split thickness skin graft was deemed most appropriate.  Using a sterile surgical marker, the primary defect shape was transferred to the donor site. The split thickness graft was then harvested.  The skin graft was then placed in the primary defect and oriented appropriately.
Burow's Graft Text: The defect edges were debeveled with a #15 scalpel blade.  Given the location of the defect, shape of the defect, the proximity to free margins and the presence of a standing cone deformity a Burow's skin graft was deemed most appropriate. The standing cone was removed and this tissue was then trimmed to the shape of the primary defect. The adipose tissue was also removed until only dermis and epidermis were left.  The skin margins of the secondary defect were undermined to an appropriate distance in all directions utilizing iris scissors.  The secondary defect was closed with interrupted buried subcutaneous sutures.  The skin edges were then re-apposed with running  sutures.  The skin graft was then placed in the primary defect and oriented appropriately.
Cartilage Graft Text: The defect edges were debeveled with a #15 scalpel blade.  Given the location of the defect, shape of the defect, the fact the defect involved a full thickness cartilage defect a cartilage graft was deemed most appropriate.  An appropriate donor site was identified, cleansed, and anesthetized. The cartilage graft was then harvested and transferred to the recipient site, oriented appropriately and then sutured into place.  The secondary defect was then repaired using a primary closure.
Composite Graft Text: The defect edges were debeveled with a #15 scalpel blade.  Given the location of the defect, shape of the defect, the proximity to free margins and the fact the defect was full thickness a composite graft was deemed most appropriate.  The defect was outline and then transferred to the donor site.  A full thickness graft was then excised from the donor site. The graft was then placed in the primary defect, oriented appropriately and then sutured into place.  The secondary defect was then repaired using a primary closure.
Epidermal Autograft Text: The defect edges were debeveled with a #15 scalpel blade.  Given the location of the defect, shape of the defect and the proximity to free margins an epidermal autograft was deemed most appropriate.  Using a sterile surgical marker, the primary defect shape was transferred to the donor site. The epidermal graft was then harvested.  The skin graft was then placed in the primary defect and oriented appropriately.
Dermal Autograft Text: The defect edges were debeveled with a #15 scalpel blade.  Given the location of the defect, shape of the defect and the proximity to free margins a dermal autograft was deemed most appropriate.  Using a sterile surgical marker, the primary defect shape was transferred to the donor site. The area thus outlined was incised deep to adipose tissue with a #15 scalpel blade.  The harvested graft was then trimmed of adipose and epidermal tissue until only dermis was left.  The skin graft was then placed in the primary defect and oriented appropriately.
Skin Substitute Text: The defect edges were debeveled with a #15 scalpel blade.  Given the location of the defect, shape of the defect and the proximity to free margins a skin substitute graft was deemed most appropriate.  The graft material was trimmed to fit the size of the defect. The graft was then placed in the primary defect and oriented appropriately.
Tissue Cultured Epidermal Autograft Text: The defect edges were debeveled with a #15 scalpel blade.  Given the location of the defect, shape of the defect and the proximity to free margins a tissue cultured epidermal autograft was deemed most appropriate.  The graft was then trimmed to fit the size of the defect.  The graft was then placed in the primary defect and oriented appropriately.
Xenograft Text: The defect edges were debeveled with a #15 scalpel blade.  Given the location of the defect, shape of the defect and the proximity to free margins a xenograft was deemed most appropriate.  The graft was then trimmed to fit the size of the defect.  The graft was then placed in the primary defect and oriented appropriately.
Purse String (Intermediate) Text: Given the location of the defect and the characteristics of the surrounding skin a purse string intermediate closure was deemed most appropriate.  Undermining was performed circumfirentially around the surgical defect.  A purse string suture was then placed and tightened.
Purse String (Simple) Text: Given the location of the defect and the characteristics of the surrounding skin a purse string simple closure was deemed most appropriate.  Undermining was performed circumferentially around the surgical defect.  A purse string suture was then placed and tightened.
Partial Purse String (Intermediate) Text: Given the location of the defect and the characteristics of the surrounding skin an intermediate purse string closure was deemed most appropriate.  Undermining was performed circumferentially around the surgical defect.  A purse string suture was then placed and tightened. Wound tension of the circular defect prevented complete closure of the wound.
Partial Purse String (Simple) Text: Given the location of the defect and the characteristics of the surrounding skin a simple purse string closure was deemed most appropriate.  Undermining was performed circumferentially around the surgical defect.  A purse string suture was then placed and tightened. Wound tension of the circular defect prevented complete closure of the wound.
Complex Repair And Single Advancement Flap Text: The defect edges were debeveled with a #15 scalpel blade.  The primary defect was closed partially with a complex linear closure.  Given the location of the remaining defect, shape of the defect and the proximity to free margins a single advancement flap was deemed most appropriate for complete closure of the defect.  Using a sterile surgical marker, an appropriate advancement flap was drawn incorporating the defect and placing the expected incisions within the relaxed skin tension lines where possible.    The area thus outlined was incised deep to adipose tissue with a #15 scalpel blade.  The skin margins were undermined to an appropriate distance in all directions utilizing iris scissors.
Complex Repair And Double Advancement Flap Text: The defect edges were debeveled with a #15 scalpel blade.  The primary defect was closed partially with a complex linear closure.  Given the location of the remaining defect, shape of the defect and the proximity to free margins a double advancement flap was deemed most appropriate for complete closure of the defect.  Using a sterile surgical marker, an appropriate advancement flap was drawn incorporating the defect and placing the expected incisions within the relaxed skin tension lines where possible.    The area thus outlined was incised deep to adipose tissue with a #15 scalpel blade.  The skin margins were undermined to an appropriate distance in all directions utilizing iris scissors.
Complex Repair And Modified Advancement Flap Text: The defect edges were debeveled with a #15 scalpel blade.  The primary defect was closed partially with a complex linear closure.  Given the location of the remaining defect, shape of the defect and the proximity to free margins a modified advancement flap was deemed most appropriate for complete closure of the defect.  Using a sterile surgical marker, an appropriate advancement flap was drawn incorporating the defect and placing the expected incisions within the relaxed skin tension lines where possible.    The area thus outlined was incised deep to adipose tissue with a #15 scalpel blade.  The skin margins were undermined to an appropriate distance in all directions utilizing iris scissors.
Complex Repair And A-T Advancement Flap Text: The defect edges were debeveled with a #15 scalpel blade.  The primary defect was closed partially with a complex linear closure.  Given the location of the remaining defect, shape of the defect and the proximity to free margins an A-T advancement flap was deemed most appropriate for complete closure of the defect.  Using a sterile surgical marker, an appropriate advancement flap was drawn incorporating the defect and placing the expected incisions within the relaxed skin tension lines where possible.    The area thus outlined was incised deep to adipose tissue with a #15 scalpel blade.  The skin margins were undermined to an appropriate distance in all directions utilizing iris scissors.
Complex Repair And O-T Advancement Flap Text: The defect edges were debeveled with a #15 scalpel blade.  The primary defect was closed partially with a complex linear closure.  Given the location of the remaining defect, shape of the defect and the proximity to free margins an O-T advancement flap was deemed most appropriate for complete closure of the defect.  Using a sterile surgical marker, an appropriate advancement flap was drawn incorporating the defect and placing the expected incisions within the relaxed skin tension lines where possible.    The area thus outlined was incised deep to adipose tissue with a #15 scalpel blade.  The skin margins were undermined to an appropriate distance in all directions utilizing iris scissors.
Complex Repair And O-L Flap Text: The defect edges were debeveled with a #15 scalpel blade.  The primary defect was closed partially with a complex linear closure.  Given the location of the remaining defect, shape of the defect and the proximity to free margins an O-L flap was deemed most appropriate for complete closure of the defect.  Using a sterile surgical marker, an appropriate flap was drawn incorporating the defect and placing the expected incisions within the relaxed skin tension lines where possible.    The area thus outlined was incised deep to adipose tissue with a #15 scalpel blade.  The skin margins were undermined to an appropriate distance in all directions utilizing iris scissors.
Complex Repair And Bilobe Flap Text: The defect edges were debeveled with a #15 scalpel blade.  The primary defect was closed partially with a complex linear closure.  Given the location of the remaining defect, shape of the defect and the proximity to free margins a bilobe flap was deemed most appropriate for complete closure of the defect.  Using a sterile surgical marker, an appropriate advancement flap was drawn incorporating the defect and placing the expected incisions within the relaxed skin tension lines where possible.    The area thus outlined was incised deep to adipose tissue with a #15 scalpel blade.  The skin margins were undermined to an appropriate distance in all directions utilizing iris scissors.
Complex Repair And Melolabial Flap Text: The defect edges were debeveled with a #15 scalpel blade.  The primary defect was closed partially with a complex linear closure.  Given the location of the remaining defect, shape of the defect and the proximity to free margins a melolabial flap was deemed most appropriate for complete closure of the defect.  Using a sterile surgical marker, an appropriate advancement flap was drawn incorporating the defect and placing the expected incisions within the relaxed skin tension lines where possible.    The area thus outlined was incised deep to adipose tissue with a #15 scalpel blade.  The skin margins were undermined to an appropriate distance in all directions utilizing iris scissors.
Complex Repair And Rotation Flap Text: The defect edges were debeveled with a #15 scalpel blade.  The primary defect was closed partially with a complex linear closure.  Given the location of the remaining defect, shape of the defect and the proximity to free margins a rotation flap was deemed most appropriate for complete closure of the defect.  Using a sterile surgical marker, an appropriate advancement flap was drawn incorporating the defect and placing the expected incisions within the relaxed skin tension lines where possible.    The area thus outlined was incised deep to adipose tissue with a #15 scalpel blade.  The skin margins were undermined to an appropriate distance in all directions utilizing iris scissors.
Complex Repair And Rhombic Flap Text: The defect edges were debeveled with a #15 scalpel blade.  The primary defect was closed partially with a complex linear closure.  Given the location of the remaining defect, shape of the defect and the proximity to free margins a rhombic flap was deemed most appropriate for complete closure of the defect.  Using a sterile surgical marker, an appropriate advancement flap was drawn incorporating the defect and placing the expected incisions within the relaxed skin tension lines where possible.    The area thus outlined was incised deep to adipose tissue with a #15 scalpel blade.  The skin margins were undermined to an appropriate distance in all directions utilizing iris scissors.
Complex Repair And Transposition Flap Text: The defect edges were debeveled with a #15 scalpel blade.  The primary defect was closed partially with a complex linear closure.  Given the location of the remaining defect, shape of the defect and the proximity to free margins a transposition flap was deemed most appropriate for complete closure of the defect.  Using a sterile surgical marker, an appropriate advancement flap was drawn incorporating the defect and placing the expected incisions within the relaxed skin tension lines where possible.    The area thus outlined was incised deep to adipose tissue with a #15 scalpel blade.  The skin margins were undermined to an appropriate distance in all directions utilizing iris scissors.
Complex Repair And V-Y Plasty Text: The defect edges were debeveled with a #15 scalpel blade.  The primary defect was closed partially with a complex linear closure.  Given the location of the remaining defect, shape of the defect and the proximity to free margins a V-Y plasty was deemed most appropriate for complete closure of the defect.  Using a sterile surgical marker, an appropriate advancement flap was drawn incorporating the defect and placing the expected incisions within the relaxed skin tension lines where possible.    The area thus outlined was incised deep to adipose tissue with a #15 scalpel blade.  The skin margins were undermined to an appropriate distance in all directions utilizing iris scissors.
Complex Repair And M Plasty Text: The defect edges were debeveled with a #15 scalpel blade.  The primary defect was closed partially with a complex linear closure.  Given the location of the remaining defect, shape of the defect and the proximity to free margins an M plasty was deemed most appropriate for complete closure of the defect.  Using a sterile surgical marker, an appropriate advancement flap was drawn incorporating the defect and placing the expected incisions within the relaxed skin tension lines where possible.    The area thus outlined was incised deep to adipose tissue with a #15 scalpel blade.  The skin margins were undermined to an appropriate distance in all directions utilizing iris scissors.
Complex Repair And Double M Plasty Text: The defect edges were debeveled with a #15 scalpel blade.  The primary defect was closed partially with a complex linear closure.  Given the location of the remaining defect, shape of the defect and the proximity to free margins a double M plasty was deemed most appropriate for complete closure of the defect.  Using a sterile surgical marker, an appropriate advancement flap was drawn incorporating the defect and placing the expected incisions within the relaxed skin tension lines where possible.    The area thus outlined was incised deep to adipose tissue with a #15 scalpel blade.  The skin margins were undermined to an appropriate distance in all directions utilizing iris scissors.
Complex Repair And W Plasty Text: The defect edges were debeveled with a #15 scalpel blade.  The primary defect was closed partially with a complex linear closure.  Given the location of the remaining defect, shape of the defect and the proximity to free margins a W plasty was deemed most appropriate for complete closure of the defect.  Using a sterile surgical marker, an appropriate advancement flap was drawn incorporating the defect and placing the expected incisions within the relaxed skin tension lines where possible.    The area thus outlined was incised deep to adipose tissue with a #15 scalpel blade.  The skin margins were undermined to an appropriate distance in all directions utilizing iris scissors.
Complex Repair And Z Plasty Text: The defect edges were debeveled with a #15 scalpel blade.  The primary defect was closed partially with a complex linear closure.  Given the location of the remaining defect, shape of the defect and the proximity to free margins a Z plasty was deemed most appropriate for complete closure of the defect.  Using a sterile surgical marker, an appropriate advancement flap was drawn incorporating the defect and placing the expected incisions within the relaxed skin tension lines where possible.    The area thus outlined was incised deep to adipose tissue with a #15 scalpel blade.  The skin margins were undermined to an appropriate distance in all directions utilizing iris scissors.
Complex Repair And Dorsal Nasal Flap Text: The defect edges were debeveled with a #15 scalpel blade.  The primary defect was closed partially with a complex linear closure.  Given the location of the remaining defect, shape of the defect and the proximity to free margins a dorsal nasal flap was deemed most appropriate for complete closure of the defect.  Using a sterile surgical marker, an appropriate flap was drawn incorporating the defect and placing the expected incisions within the relaxed skin tension lines where possible.    The area thus outlined was incised deep to adipose tissue with a #15 scalpel blade.  The skin margins were undermined to an appropriate distance in all directions utilizing iris scissors.
Complex Repair And Ftsg Text: The defect edges were debeveled with a #15 scalpel blade.  The primary defect was closed partially with a complex linear closure.  Given the location of the defect, shape of the defect and the proximity to free margins a full thickness skin graft was deemed most appropriate to repair the remaining defect.  The graft was trimmed to fit the size of the remaining defect.  The graft was then placed in the primary defect, oriented appropriately, and sutured into place.
Complex Repair And Burow's Graft Text: The defect edges were debeveled with a #15 scalpel blade.  The primary defect was closed partially with a complex linear closure.  Given the location of the defect, shape of the defect, the proximity to free margins and the presence of a standing cone deformity a Burow's graft was deemed most appropriate to repair the remaining defect.  The graft was trimmed to fit the size of the remaining defect.  The graft was then placed in the primary defect, oriented appropriately, and sutured into place.
Complex Repair And Split-Thickness Skin Graft Text: The defect edges were debeveled with a #15 scalpel blade.  The primary defect was closed partially with a complex linear closure.  Given the location of the defect, shape of the defect and the proximity to free margins a split thickness skin graft was deemed most appropriate to repair the remaining defect.  The graft was trimmed to fit the size of the remaining defect.  The graft was then placed in the primary defect, oriented appropriately, and sutured into place.
Complex Repair And Epidermal Autograft Text: The defect edges were debeveled with a #15 scalpel blade.  The primary defect was closed partially with a complex linear closure.  Given the location of the defect, shape of the defect and the proximity to free margins an epidermal autograft was deemed most appropriate to repair the remaining defect.  The graft was trimmed to fit the size of the remaining defect.  The graft was then placed in the primary defect, oriented appropriately, and sutured into place.
Complex Repair And Dermal Autograft Text: The defect edges were debeveled with a #15 scalpel blade.  The primary defect was closed partially with a complex linear closure.  Given the location of the defect, shape of the defect and the proximity to free margins an dermal autograft was deemed most appropriate to repair the remaining defect.  The graft was trimmed to fit the size of the remaining defect.  The graft was then placed in the primary defect, oriented appropriately, and sutured into place.
Complex Repair And Tissue Cultured Epidermal Autograft Text: The defect edges were debeveled with a #15 scalpel blade.  The primary defect was closed partially with a complex linear closure.  Given the location of the defect, shape of the defect and the proximity to free margins an tissue cultured epidermal autograft was deemed most appropriate to repair the remaining defect.  The graft was trimmed to fit the size of the remaining defect.  The graft was then placed in the primary defect, oriented appropriately, and sutured into place.
Complex Repair And Xenograft Text: The defect edges were debeveled with a #15 scalpel blade.  The primary defect was closed partially with a complex linear closure.  Given the location of the defect, shape of the defect and the proximity to free margins a xenograft was deemed most appropriate to repair the remaining defect.  The graft was trimmed to fit the size of the remaining defect.  The graft was then placed in the primary defect, oriented appropriately, and sutured into place.
Complex Repair And Skin Substitute Graft Text: The defect edges were debeveled with a #15 scalpel blade.  The primary defect was closed partially with a complex linear closure.  Given the location of the remaining defect, shape of the defect and the proximity to free margins a skin substitute graft was deemed most appropriate to repair the remaining defect.  The graft was trimmed to fit the size of the remaining defect.  The graft was then placed in the primary defect, oriented appropriately, and sutured into place.
Path Notes (To The Dermatopathologist): Please check margins.
Consent: Verbal consent was obtained from the patient. The risks and benefits to therapy were discussed in detail. Specifically, the risks of infection, scarring, bleeding, prolonged wound healing, incomplete removal, allergy to anesthesia, nerve injury and recurrence were addressed. Prior to the procedure, the treatment site was clearly identified and confirmed by the patient. All components of Universal Protocol/PAUSE Rule completed.
Post-Care Instructions: I reviewed with the patient in detail post-care instructions. Patient is not to engage in any heavy lifting, exercise, or swimming for the next 14 days. Should the patient develop any fevers, chills, bleeding, severe pain patient will contact the office immediately.
Home Suture Removal Text: Patient will be out of town in 14 days so given disposable s/r kit and will have someone remove sutures there.
Where Do You Want The Question To Include Opioid Counseling Located?: Case Summary Tab
Information: Selecting Yes will display possible errors in your note based on the variables you have selected. This validation is only offered as a suggestion for you. PLEASE NOTE THAT THE VALIDATION TEXT WILL BE REMOVED WHEN YOU FINALIZE YOUR NOTE. IF YOU WANT TO FAX A PRELIMINARY NOTE YOU WILL NEED TO TOGGLE THIS TO 'NO' IF YOU DO NOT WANT IT IN YOUR FAXED NOTE.

## 2022-07-07 DIAGNOSIS — T75.3XXS SEA SICKNESS, SEQUELA: ICD-10-CM

## 2022-07-07 RX ORDER — SCOLOPAMINE TRANSDERMAL SYSTEM 1 MG/1
1 PATCH, EXTENDED RELEASE TRANSDERMAL
Qty: 4 PATCH | Refills: 3 | Status: SHIPPED
Start: 2022-07-07 | End: 2022-09-26

## 2022-07-08 ENCOUNTER — APPOINTMENT (RX ONLY)
Dept: URBAN - METROPOLITAN AREA CLINIC 4 | Facility: CLINIC | Age: 47
Setting detail: DERMATOLOGY
End: 2022-07-08

## 2022-07-08 ENCOUNTER — HOSPITAL ENCOUNTER (OUTPATIENT)
Facility: MEDICAL CENTER | Age: 47
End: 2022-07-08
Attending: DERMATOLOGY
Payer: COMMERCIAL

## 2022-07-08 DIAGNOSIS — Z48.817 ENCOUNTER FOR SURGICAL AFTERCARE FOLLOWING SURGERY ON THE SKIN AND SUBCUTANEOUS TISSUE: ICD-10-CM

## 2022-07-08 PROCEDURE — 87077 CULTURE AEROBIC IDENTIFY: CPT

## 2022-07-08 PROCEDURE — ? PHOTO-DOCUMENTATION

## 2022-07-08 PROCEDURE — ? POST-OP WOUND EVALUATION

## 2022-07-08 PROCEDURE — ? PRESCRIPTION

## 2022-07-08 PROCEDURE — 87205 SMEAR GRAM STAIN: CPT

## 2022-07-08 PROCEDURE — ? ORDER TESTS

## 2022-07-08 PROCEDURE — 87186 SC STD MICRODIL/AGAR DIL: CPT

## 2022-07-08 PROCEDURE — 87070 CULTURE OTHR SPECIMN AEROBIC: CPT

## 2022-07-08 RX ORDER — DOXYCYCLINE HYCLATE 100 MG/1
1 TABLET, COATED ORAL BID
Qty: 20 | Refills: 0 | Status: ERX | COMMUNITY
Start: 2022-07-08

## 2022-07-08 RX ADMIN — DOXYCYCLINE HYCLATE 1: 100 TABLET, COATED ORAL at 00:00

## 2022-07-08 ASSESSMENT — LOCATION ZONE DERM: LOCATION ZONE: TRUNK

## 2022-07-08 ASSESSMENT — LOCATION SIMPLE DESCRIPTION DERM: LOCATION SIMPLE: LEFT UPPER BACK

## 2022-07-08 ASSESSMENT — LOCATION DETAILED DESCRIPTION DERM: LOCATION DETAILED: LEFT MID-UPPER BACK

## 2022-07-08 NOTE — PROCEDURE: ORDER TESTS
Expected Date Of Service: 07/08/2022
Performing Laboratory: [id number]
Billing Type: Third-Party Bill
Lab Facility: [id number]
Bill For Surgical Tray: no

## 2022-07-08 NOTE — PROCEDURE: POST-OP WOUND EVALUATION
Detail Level: Detailed
Add 09517 Cpt? (Important Note: In 2017 The Use Of 55625 Is Being Tracked By Cms To Determine Future Global Period Reimbursement For Global Periods): no
Wound Evaluated By (Optional): Dr. [place]
Wound Diameter In Cm(Optional): 0
Wound Crusting?: crusted
Wound Discharge?: minimal discharge
Sutures?: intact
Wound Edema?: moderate
Wound Drainage?: serous drainage
Wound Color?: red

## 2022-07-09 LAB
GRAM STN SPEC: NORMAL
SIGNIFICANT IND 70042: NORMAL
SITE SITE: NORMAL
SOURCE SOURCE: NORMAL

## 2022-07-11 LAB
BACTERIA WND AEROBE CULT: ABNORMAL
GRAM STN SPEC: ABNORMAL
SIGNIFICANT IND 70042: ABNORMAL
SITE SITE: ABNORMAL
SOURCE SOURCE: ABNORMAL

## 2022-08-24 DIAGNOSIS — I10 ESSENTIAL HYPERTENSION: ICD-10-CM

## 2022-08-24 RX ORDER — LOSARTAN POTASSIUM 50 MG/1
50 TABLET ORAL
Qty: 90 TABLET | Refills: 1 | Status: SHIPPED | OUTPATIENT
Start: 2022-08-24 | End: 2023-02-20 | Stop reason: SDUPTHER

## 2022-09-08 DIAGNOSIS — E78.2 MIXED DYSLIPIDEMIA: ICD-10-CM

## 2022-09-08 DIAGNOSIS — I10 PRIMARY HYPERTENSION: ICD-10-CM

## 2022-09-08 DIAGNOSIS — Z00.00 HEALTH CARE MAINTENANCE: ICD-10-CM

## 2022-09-12 ENCOUNTER — HOSPITAL ENCOUNTER (OUTPATIENT)
Facility: MEDICAL CENTER | Age: 47
End: 2022-09-12
Attending: FAMILY MEDICINE
Payer: COMMERCIAL

## 2022-09-12 ENCOUNTER — NON-PROVIDER VISIT (OUTPATIENT)
Dept: INTERNAL MEDICINE | Facility: IMAGING CENTER | Age: 47
End: 2022-09-12
Payer: COMMERCIAL

## 2022-09-12 DIAGNOSIS — Z00.00 HEALTH CARE MAINTENANCE: ICD-10-CM

## 2022-09-12 DIAGNOSIS — I10 PRIMARY HYPERTENSION: ICD-10-CM

## 2022-09-12 DIAGNOSIS — E78.2 MIXED DYSLIPIDEMIA: ICD-10-CM

## 2022-09-12 PROCEDURE — 85025 COMPLETE CBC W/AUTO DIFF WBC: CPT

## 2022-09-12 PROCEDURE — 83036 HEMOGLOBIN GLYCOSYLATED A1C: CPT

## 2022-09-12 PROCEDURE — 84443 ASSAY THYROID STIM HORMONE: CPT

## 2022-09-12 PROCEDURE — 80053 COMPREHEN METABOLIC PANEL: CPT

## 2022-09-12 PROCEDURE — 80061 LIPID PANEL: CPT

## 2022-09-12 PROCEDURE — 82306 VITAMIN D 25 HYDROXY: CPT

## 2022-09-13 LAB
25(OH)D3 SERPL-MCNC: 40 NG/ML (ref 30–100)
ALBUMIN SERPL BCP-MCNC: 4.8 G/DL (ref 3.2–4.9)
ALBUMIN/GLOB SERPL: 1.9 G/DL
ALP SERPL-CCNC: 73 U/L (ref 30–99)
ALT SERPL-CCNC: 56 U/L (ref 2–50)
ANION GAP SERPL CALC-SCNC: 12 MMOL/L (ref 7–16)
AST SERPL-CCNC: 42 U/L (ref 12–45)
BASOPHILS # BLD AUTO: 1.3 % (ref 0–1.8)
BASOPHILS # BLD: 0.09 K/UL (ref 0–0.12)
BILIRUB SERPL-MCNC: 0.7 MG/DL (ref 0.1–1.5)
BUN SERPL-MCNC: 14 MG/DL (ref 8–22)
CALCIUM SERPL-MCNC: 9.4 MG/DL (ref 8.5–10.5)
CHLORIDE SERPL-SCNC: 100 MMOL/L (ref 96–112)
CHOLEST SERPL-MCNC: 196 MG/DL (ref 100–199)
CO2 SERPL-SCNC: 26 MMOL/L (ref 20–33)
CREAT SERPL-MCNC: 0.82 MG/DL (ref 0.5–1.4)
EOSINOPHIL # BLD AUTO: 0.17 K/UL (ref 0–0.51)
EOSINOPHIL NFR BLD: 2.4 % (ref 0–6.9)
ERYTHROCYTE [DISTWIDTH] IN BLOOD BY AUTOMATED COUNT: 45.3 FL (ref 35.9–50)
EST. AVERAGE GLUCOSE BLD GHB EST-MCNC: 114 MG/DL
GFR SERPLBLD CREATININE-BSD FMLA CKD-EPI: 89 ML/MIN/1.73 M 2
GLOBULIN SER CALC-MCNC: 2.5 G/DL (ref 1.9–3.5)
GLUCOSE SERPL-MCNC: 104 MG/DL (ref 65–99)
HBA1C MFR BLD: 5.6 % (ref 4–5.6)
HCT VFR BLD AUTO: 44.3 % (ref 37–47)
HDLC SERPL-MCNC: 43 MG/DL
HGB BLD-MCNC: 14.9 G/DL (ref 12–16)
IMM GRANULOCYTES # BLD AUTO: 0.03 K/UL (ref 0–0.11)
IMM GRANULOCYTES NFR BLD AUTO: 0.4 % (ref 0–0.9)
LDLC SERPL CALC-MCNC: 107 MG/DL
LYMPHOCYTES # BLD AUTO: 1.59 K/UL (ref 1–4.8)
LYMPHOCYTES NFR BLD: 22.3 % (ref 22–41)
MCH RBC QN AUTO: 31.1 PG (ref 27–33)
MCHC RBC AUTO-ENTMCNC: 33.6 G/DL (ref 33.6–35)
MCV RBC AUTO: 92.5 FL (ref 81.4–97.8)
MONOCYTES # BLD AUTO: 0.57 K/UL (ref 0–0.85)
MONOCYTES NFR BLD AUTO: 8 % (ref 0–13.4)
NEUTROPHILS # BLD AUTO: 4.67 K/UL (ref 2–7.15)
NEUTROPHILS NFR BLD: 65.6 % (ref 44–72)
NRBC # BLD AUTO: 0 K/UL
NRBC BLD-RTO: 0 /100 WBC
PLATELET # BLD AUTO: 185 K/UL (ref 164–446)
PMV BLD AUTO: 11.1 FL (ref 9–12.9)
POTASSIUM SERPL-SCNC: 4.3 MMOL/L (ref 3.6–5.5)
PROT SERPL-MCNC: 7.3 G/DL (ref 6–8.2)
RBC # BLD AUTO: 4.79 M/UL (ref 4.2–5.4)
SODIUM SERPL-SCNC: 138 MMOL/L (ref 135–145)
TRIGL SERPL-MCNC: 231 MG/DL (ref 0–149)
TSH SERPL DL<=0.005 MIU/L-ACNC: 1.64 UIU/ML (ref 0.38–5.33)
WBC # BLD AUTO: 7.1 K/UL (ref 4.8–10.8)

## 2022-09-19 ENCOUNTER — OFFICE VISIT (OUTPATIENT)
Dept: INTERNAL MEDICINE | Facility: IMAGING CENTER | Age: 47
End: 2022-09-19
Payer: COMMERCIAL

## 2022-09-19 DIAGNOSIS — K21.9 GASTROESOPHAGEAL REFLUX DISEASE WITHOUT ESOPHAGITIS: ICD-10-CM

## 2022-09-19 DIAGNOSIS — I10 PRIMARY HYPERTENSION: ICD-10-CM

## 2022-09-19 DIAGNOSIS — G43.909 MIGRAINE SYNDROME: ICD-10-CM

## 2022-09-19 DIAGNOSIS — E78.2 MIXED DYSLIPIDEMIA: ICD-10-CM

## 2022-09-19 DIAGNOSIS — Z00.00 WELLNESS EXAMINATION: ICD-10-CM

## 2022-09-19 DIAGNOSIS — I47.10 SVT (SUPRAVENTRICULAR TACHYCARDIA) (HCC): ICD-10-CM

## 2022-09-19 DIAGNOSIS — R79.89 ELEVATED LFTS: ICD-10-CM

## 2022-09-19 DIAGNOSIS — Z71.85 VACCINE COUNSELING: ICD-10-CM

## 2022-09-19 DIAGNOSIS — C43.59 MALIGNANT MELANOMA OF BACK (HCC): ICD-10-CM

## 2022-09-19 DIAGNOSIS — Z12.31 ENCOUNTER FOR SCREENING MAMMOGRAM FOR BREAST CANCER: ICD-10-CM

## 2022-09-19 PROCEDURE — 99396 PREV VISIT EST AGE 40-64: CPT | Performed by: FAMILY MEDICINE

## 2022-09-19 RX ORDER — ATORVASTATIN CALCIUM 40 MG/1
40 TABLET, FILM COATED ORAL
Qty: 90 TABLET | Refills: 3 | Status: SHIPPED | OUTPATIENT
Start: 2022-09-19

## 2022-09-19 ASSESSMENT — PATIENT HEALTH QUESTIONNAIRE - PHQ9: CLINICAL INTERPRETATION OF PHQ2 SCORE: 0

## 2022-09-19 ASSESSMENT — FIBROSIS 4 INDEX: FIB4 SCORE: 1.43

## 2022-09-26 VITALS
SYSTOLIC BLOOD PRESSURE: 120 MMHG | HEIGHT: 68 IN | RESPIRATION RATE: 17 BRPM | HEART RATE: 91 BPM | TEMPERATURE: 97.9 F | DIASTOLIC BLOOD PRESSURE: 70 MMHG | BODY MASS INDEX: 40.74 KG/M2 | WEIGHT: 268.8 LBS | OXYGEN SATURATION: 98 %

## 2022-09-26 PROBLEM — G43.909 MIGRAINE SYNDROME: Status: ACTIVE | Noted: 2018-03-28

## 2022-09-26 PROBLEM — E66.9 OBESITY (BMI 30-39.9): Status: RESOLVED | Noted: 2019-09-13 | Resolved: 2022-09-26

## 2022-09-27 NOTE — PROGRESS NOTES
"Chief Complaint   Patient presents with    Annual Exam     Review labs.    Medication Refill     Atorvastatin       HPI:  Patient is a 47 y.o. female established patient who presents today for her annual exam and to review labs done 9/12/22. She has history of chronic migraine syndrome managed historically by [facility] headache clinic, and chronic GERD with ongoing PPI use. She has chronic primary HTN and prior history of SVT managed with daily medication use. She has chronic mixed dyslipidemia with ongoing statin compliance and has history of malignant melanoma removed from left upper trap area in June 2022. She continues to see her dermatology team every three months for surveillance, and I recommended ScarAway pads on her scar for ongoing healing management. She is UTD with GYN care at [facility] and denies new mental health concerns. She remains busy with her work and family and is in good spirits at our visit today.     Patient Active Problem List    Diagnosis Date Noted    BMI 40.0-44.9, adult ([facility]) 09/26/2022    Malignant melanoma of back ([facility]) 06/2022    History of COVID-19 12/01/2020    Dry mouth 09/29/2020    Mixed dyslipidemia 09/13/2019    SVT (supraventricular tachycardia) ([facility]) 09/08/2019    Hypertension 09/08/2019    GERD (gastroesophageal reflux disease) 09/08/2019    Cervicogenic headache 12/12/2018    Migraine syndrome 03/28/2018       Past medical, surgical, family, and social history was reviewed and updated in Epic chart by me today.     Medications and allergies reviewed and updated in Epic chart by me today.     Lab results 9/12/22 reviewed with patient at visit today.     ROS:  Pertinent positives listed above in HPI. All other systems have been reviewed and are negative.    PE:   /70 (BP Location: Left arm, Patient Position: Sitting, BP Cuff Size: Adult long)   Pulse 91   Temp 36.6 °C (97.9 °F) (Temporal)   Resp 17   Ht 1.727 m (5' 8\")   Wt 122 kg (268 lb 12.8 oz)   SpO2 98%  "  Breastfeeding No   BMI 40.87 kg/m²   Vital signs reviewed with patient.     Gen: Well developed; well nourished; no acute distress; age appropriate appearance   HEENT: Normocephalic; atraumatic; PEERLA b/l; sclera clear b/l; b/l external auditory canals WNL; b/l TM WNL; nares patent; oropharynx clear; mask in place  Neck: No adenopathy; no thyromegaly  CV: Regular rate and rhythm; S1/ S2 present; no murmur, gallop or rub noted  Pulm: No respiratory distress; clear to ascultation b/l; no wheezing or stridor noted b/l  Abd: Adequate bowel sounds noted; soft and nontender; no rebound, rigidity, nor distention  Extremities: No peripheral edema b/l LE extremities/ no clubbing nor cyanosis noted  Skin: Warm and dry; no rashes noted   Neuro: No focal deficits noted   Psych: AAOx4; mood and affect are appropriate    A/P:  1. Encounter for screening mammogram for breast cancer  Patient will be due for screening mammogram in December, and she will make imaging appointment accordingly.   - MA-SCREENING MAMMO BILAT W/TOMOSYNTHESIS W/CAD; Future    2. Mixed dyslipidemia  Improved as compared to prior lipid panel results. Recommend patient continue current Lipitor use, make necessary diet and lifestyle changes, and will follow. New RX sent to pharmacy.   - atorvastatin (LIPITOR) 40 MG Tab; Take 1 Tablet by mouth at bedtime.  Dispense: 90 Tablet; Refill: 3    3. Elevated LFTs  ALT remains mildly elevated and recommend patient obtain abdominal US for further evaluation.   - US-ABDOMEN COMPLETE SURVEY; Future    4. Malignant melanoma of back (HCC)  S/p removal left upper trap area June 2022. Patient continues to see her dermatology team every three months for ongoing surveillance.     5. Migraine syndrome  Stable/ managed historically by [facility] headache clinic.     6. SVT (supraventricular tachycardia) ([facility])  Stable/ no recent episodes reported with ongoing medication use.     7. Primary hypertension  Stable/ recommend patient  continue current daily medication use.     8. Gastroesophageal reflux disease without esophagitis  Stable/ recommend patient continue current PPI use.     9. BMI 40.0-44.9, adult (HCC)  Chronic condition for patient     10. Vaccine counseling  Patient is aware of vaccine eligibility.     11. Wellness examination  Patient remains well informed about medical conditions that need additional attention moving forward.

## 2022-10-11 ENCOUNTER — APPOINTMENT (RX ONLY)
Dept: URBAN - METROPOLITAN AREA CLINIC 4 | Facility: CLINIC | Age: 47
Setting detail: DERMATOLOGY
End: 2022-10-11

## 2022-10-11 DIAGNOSIS — L57.0 ACTINIC KERATOSIS: ICD-10-CM

## 2022-10-11 DIAGNOSIS — L82.1 OTHER SEBORRHEIC KERATOSIS: ICD-10-CM

## 2022-10-11 DIAGNOSIS — Z71.89 OTHER SPECIFIED COUNSELING: ICD-10-CM

## 2022-10-11 DIAGNOSIS — D22 MELANOCYTIC NEVI: ICD-10-CM

## 2022-10-11 DIAGNOSIS — R23.1 PALLOR: ICD-10-CM

## 2022-10-11 DIAGNOSIS — Z85.820 PERSONAL HISTORY OF MALIGNANT MELANOMA OF SKIN: ICD-10-CM

## 2022-10-11 PROBLEM — D23.62 OTHER BENIGN NEOPLASM OF SKIN OF LEFT UPPER LIMB, INCLUDING SHOULDER: Status: ACTIVE | Noted: 2022-10-11

## 2022-10-11 PROBLEM — D22.72 MELANOCYTIC NEVI OF LEFT LOWER LIMB, INCLUDING HIP: Status: ACTIVE | Noted: 2022-10-11

## 2022-10-11 PROBLEM — D22.62 MELANOCYTIC NEVI OF LEFT UPPER LIMB, INCLUDING SHOULDER: Status: ACTIVE | Noted: 2022-10-11

## 2022-10-11 PROBLEM — D22.71 MELANOCYTIC NEVI OF RIGHT LOWER LIMB, INCLUDING HIP: Status: ACTIVE | Noted: 2022-10-11

## 2022-10-11 PROBLEM — D22.39 MELANOCYTIC NEVI OF OTHER PARTS OF FACE: Status: ACTIVE | Noted: 2022-10-11

## 2022-10-11 PROBLEM — D22.5 MELANOCYTIC NEVI OF TRUNK: Status: ACTIVE | Noted: 2022-10-11

## 2022-10-11 PROBLEM — D22.61 MELANOCYTIC NEVI OF RIGHT UPPER LIMB, INCLUDING SHOULDER: Status: ACTIVE | Noted: 2022-10-11

## 2022-10-11 PROCEDURE — ? COUNSELING

## 2022-10-11 PROCEDURE — 17003 DESTRUCT PREMALG LES 2-14: CPT

## 2022-10-11 PROCEDURE — ? ADDITIONAL NOTES

## 2022-10-11 PROCEDURE — 99213 OFFICE O/P EST LOW 20 MIN: CPT | Mod: 25

## 2022-10-11 PROCEDURE — ? SUNSCREEN TREATMENT REGIMEN

## 2022-10-11 PROCEDURE — 17000 DESTRUCT PREMALG LESION: CPT

## 2022-10-11 PROCEDURE — ? LIQUID NITROGEN

## 2022-10-11 ASSESSMENT — LOCATION DETAILED DESCRIPTION DERM
LOCATION DETAILED: RIGHT INFERIOR CENTRAL MALAR CHEEK
LOCATION DETAILED: MIDDLE STERNUM
LOCATION DETAILED: RIGHT PROXIMAL POSTERIOR UPPER ARM
LOCATION DETAILED: RIGHT DORSAL FOOT
LOCATION DETAILED: RIGHT ANTERIOR PROXIMAL THIGH
LOCATION DETAILED: RIGHT DISTAL PRETIBIAL REGION
LOCATION DETAILED: LEFT DISTAL POSTERIOR UPPER ARM
LOCATION DETAILED: LEFT DORSAL FOOT
LOCATION DETAILED: RIGHT DISTAL POSTERIOR UPPER ARM
LOCATION DETAILED: LEFT MID-UPPER BACK
LOCATION DETAILED: LEFT NASAL SIDEWALL
LOCATION DETAILED: LEFT MEDIAL UPPER BACK
LOCATION DETAILED: LEFT ANTERIOR PROXIMAL THIGH
LOCATION DETAILED: LEFT PROXIMAL POSTERIOR UPPER ARM
LOCATION DETAILED: SUPERIOR MID FOREHEAD
LOCATION DETAILED: NASAL DORSUM
LOCATION DETAILED: SUPERIOR THORACIC SPINE
LOCATION DETAILED: LEFT DISTAL PRETIBIAL REGION
LOCATION DETAILED: LOWER STERNUM
LOCATION DETAILED: LEFT NASAL DORSUM
LOCATION DETAILED: LEFT PROXIMAL PRETIBIAL REGION
LOCATION DETAILED: RIGHT PROXIMAL PRETIBIAL REGION

## 2022-10-11 ASSESSMENT — LOCATION ZONE DERM
LOCATION ZONE: ARM
LOCATION ZONE: LEG
LOCATION ZONE: FACE
LOCATION ZONE: TRUNK
LOCATION ZONE: FEET
LOCATION ZONE: NOSE

## 2022-10-11 ASSESSMENT — LOCATION SIMPLE DESCRIPTION DERM
LOCATION SIMPLE: RIGHT FOOT
LOCATION SIMPLE: LEFT THIGH
LOCATION SIMPLE: SUPERIOR FOREHEAD
LOCATION SIMPLE: LEFT UPPER BACK
LOCATION SIMPLE: CHEST
LOCATION SIMPLE: RIGHT PRETIBIAL REGION
LOCATION SIMPLE: UPPER BACK
LOCATION SIMPLE: RIGHT THIGH
LOCATION SIMPLE: LEFT PRETIBIAL REGION
LOCATION SIMPLE: LEFT FOOT
LOCATION SIMPLE: LEFT NOSE
LOCATION SIMPLE: RIGHT CHEEK
LOCATION SIMPLE: NOSE
LOCATION SIMPLE: RIGHT POSTERIOR UPPER ARM
LOCATION SIMPLE: LEFT POSTERIOR UPPER ARM

## 2022-10-11 NOTE — PROCEDURE: COUNSELING
Detail Level: Simple
Detail Level: Detailed
Detail Level: Zone
When Should The Patient Follow-Up For Their Next Full-Body Skin Exam?: 3 Months
Quality 137: Melanoma: Continuity Of Care - Recall System: Patient information entered into a recall system that includes: target date for the next exam specified AND a process to follow up with patients regarding missed or unscheduled appointments

## 2022-10-11 NOTE — PROCEDURE: LIQUID NITROGEN
Detail Level: Detailed
Consent: The patient's consent was obtained including but not limited to risks of crusting, scabbing, blistering, scarring, darker or lighter pigmentary change, recurrence, incomplete removal and infection.
Show Aperture Variable?: Yes
Duration Of Freeze Thaw-Cycle (Seconds): 3
Render Post-Care Instructions In Note?: no
Post-Care Instructions: I reviewed with the patient in detail post-care instructions. Patient is to wear sunprotection, and avoid picking at any of the treated lesions. Pt may apply Vaseline to crusted or scabbing areas.

## 2022-10-11 NOTE — PROCEDURE: MIPS QUALITY
Detail Level: Detailed
Quality 130: Documentation Of Current Medications In The Medical Record: Current Medications Documented
Quality 138: Melanoma: Coordination Of Care: A treatment plan was communicated to the physicians providing continuing care within one month of diagnosis outlining: diagnosis, tumor thickness and a plan for surgery or alternate care.
Quality 226: Preventive Care And Screening: Tobacco Use: Screening And Cessation Intervention: Patient screened for tobacco use and is an ex/non-smoker
Quality 137: Melanoma: Continuity Of Care - Recall System: Patient information entered into a recall system that includes: target date for the next exam specified AND a process to follow up with patients regarding missed or unscheduled appointments
Quality 397: Melanoma: Reporting: Pathology report includes the pT Category, thickness, ulceration and mitotic rate, peripheral and deep margin status and presence or absence of microsatellitosis for invasive tumors.

## 2022-10-11 NOTE — PROCEDURE: ADDITIONAL NOTES
Detail Level: Simple
Render Risk Assessment In Note?: no
Additional Notes: No change
Additional Notes: Patient states PCP did full work up and she has seen vascular specialists. No known underlying connective tissue disorders or known vasculitis. Patient states it has been present for many years without significant change.

## 2022-10-26 ENCOUNTER — HOSPITAL ENCOUNTER (OUTPATIENT)
Dept: RADIOLOGY | Facility: MEDICAL CENTER | Age: 47
End: 2022-10-26
Attending: FAMILY MEDICINE
Payer: COMMERCIAL

## 2022-10-26 DIAGNOSIS — R79.89 ELEVATED LFTS: ICD-10-CM

## 2022-10-26 PROCEDURE — 76700 US EXAM ABDOM COMPLETE: CPT

## 2022-10-27 ENCOUNTER — NON-PROVIDER VISIT (OUTPATIENT)
Dept: INTERNAL MEDICINE | Facility: IMAGING CENTER | Age: 47
End: 2022-10-27
Payer: COMMERCIAL

## 2022-10-27 DIAGNOSIS — Z23 NEED FOR VACCINATION: ICD-10-CM

## 2022-10-27 PROCEDURE — 90471 IMMUNIZATION ADMIN: CPT | Performed by: FAMILY MEDICINE

## 2022-10-27 PROCEDURE — 90686 IIV4 VACC NO PRSV 0.5 ML IM: CPT | Performed by: FAMILY MEDICINE

## 2023-01-17 ENCOUNTER — APPOINTMENT (RX ONLY)
Dept: URBAN - METROPOLITAN AREA CLINIC 6 | Facility: CLINIC | Age: 48
Setting detail: DERMATOLOGY
End: 2023-01-17

## 2023-01-17 DIAGNOSIS — L82.1 OTHER SEBORRHEIC KERATOSIS: ICD-10-CM

## 2023-01-17 DIAGNOSIS — Z71.89 OTHER SPECIFIED COUNSELING: ICD-10-CM

## 2023-01-17 DIAGNOSIS — R23.1 PALLOR: ICD-10-CM

## 2023-01-17 DIAGNOSIS — D22 MELANOCYTIC NEVI: ICD-10-CM

## 2023-01-17 DIAGNOSIS — Z85.820 PERSONAL HISTORY OF MALIGNANT MELANOMA OF SKIN: ICD-10-CM

## 2023-01-17 PROBLEM — D22.62 MELANOCYTIC NEVI OF LEFT UPPER LIMB, INCLUDING SHOULDER: Status: ACTIVE | Noted: 2023-01-17

## 2023-01-17 PROBLEM — D22.61 MELANOCYTIC NEVI OF RIGHT UPPER LIMB, INCLUDING SHOULDER: Status: ACTIVE | Noted: 2023-01-17

## 2023-01-17 PROBLEM — D48.5 NEOPLASM OF UNCERTAIN BEHAVIOR OF SKIN: Status: ACTIVE | Noted: 2023-01-17

## 2023-01-17 PROBLEM — D22.72 MELANOCYTIC NEVI OF LEFT LOWER LIMB, INCLUDING HIP: Status: ACTIVE | Noted: 2023-01-17

## 2023-01-17 PROBLEM — D22.39 MELANOCYTIC NEVI OF OTHER PARTS OF FACE: Status: ACTIVE | Noted: 2023-01-17

## 2023-01-17 PROBLEM — D22.71 MELANOCYTIC NEVI OF RIGHT LOWER LIMB, INCLUDING HIP: Status: ACTIVE | Noted: 2023-01-17

## 2023-01-17 PROBLEM — D23.62 OTHER BENIGN NEOPLASM OF SKIN OF LEFT UPPER LIMB, INCLUDING SHOULDER: Status: ACTIVE | Noted: 2023-01-17

## 2023-01-17 PROBLEM — D22.5 MELANOCYTIC NEVI OF TRUNK: Status: ACTIVE | Noted: 2023-01-17

## 2023-01-17 PROCEDURE — ? SUNSCREEN TREATMENT REGIMEN

## 2023-01-17 PROCEDURE — 11102 TANGNTL BX SKIN SINGLE LES: CPT

## 2023-01-17 PROCEDURE — 99213 OFFICE O/P EST LOW 20 MIN: CPT | Mod: 25

## 2023-01-17 PROCEDURE — ? COUNSELING

## 2023-01-17 PROCEDURE — ? ADDITIONAL NOTES

## 2023-01-17 PROCEDURE — ? BIOPSY BY SHAVE METHOD

## 2023-01-17 ASSESSMENT — LOCATION SIMPLE DESCRIPTION DERM
LOCATION SIMPLE: LEFT FOOT
LOCATION SIMPLE: RIGHT FOOT
LOCATION SIMPLE: RIGHT POSTERIOR UPPER ARM
LOCATION SIMPLE: RIGHT PRETIBIAL REGION
LOCATION SIMPLE: UPPER BACK
LOCATION SIMPLE: CHEST
LOCATION SIMPLE: RIGHT CHEEK
LOCATION SIMPLE: LEFT THIGH
LOCATION SIMPLE: LEFT PRETIBIAL REGION
LOCATION SIMPLE: LEFT UPPER BACK
LOCATION SIMPLE: LEFT POSTERIOR UPPER ARM
LOCATION SIMPLE: RIGHT THIGH

## 2023-01-17 ASSESSMENT — LOCATION DETAILED DESCRIPTION DERM
LOCATION DETAILED: RIGHT DISTAL PRETIBIAL REGION
LOCATION DETAILED: RIGHT PROXIMAL PRETIBIAL REGION
LOCATION DETAILED: RIGHT PROXIMAL POSTERIOR UPPER ARM
LOCATION DETAILED: LEFT PROXIMAL POSTERIOR UPPER ARM
LOCATION DETAILED: RIGHT INFERIOR CENTRAL MALAR CHEEK
LOCATION DETAILED: LEFT DISTAL POSTERIOR UPPER ARM
LOCATION DETAILED: LEFT MID-UPPER BACK
LOCATION DETAILED: LEFT DISTAL PRETIBIAL REGION
LOCATION DETAILED: RIGHT DISTAL POSTERIOR UPPER ARM
LOCATION DETAILED: LEFT ANTERIOR PROXIMAL THIGH
LOCATION DETAILED: LOWER STERNUM
LOCATION DETAILED: RIGHT ANTERIOR PROXIMAL THIGH
LOCATION DETAILED: SUPERIOR THORACIC SPINE
LOCATION DETAILED: LEFT DORSAL FOOT
LOCATION DETAILED: LEFT PROXIMAL PRETIBIAL REGION
LOCATION DETAILED: RIGHT DORSAL FOOT

## 2023-01-17 ASSESSMENT — LOCATION ZONE DERM
LOCATION ZONE: TRUNK
LOCATION ZONE: FEET
LOCATION ZONE: FACE
LOCATION ZONE: LEG
LOCATION ZONE: ARM

## 2023-01-30 ENCOUNTER — HOSPITAL ENCOUNTER (OUTPATIENT)
Dept: RADIOLOGY | Facility: MEDICAL CENTER | Age: 48
End: 2023-01-30
Attending: FAMILY MEDICINE
Payer: COMMERCIAL

## 2023-01-30 DIAGNOSIS — Z12.31 ENCOUNTER FOR SCREENING MAMMOGRAM FOR BREAST CANCER: ICD-10-CM

## 2023-01-30 PROCEDURE — 77063 BREAST TOMOSYNTHESIS BI: CPT

## 2023-02-13 DIAGNOSIS — G43.709 CHRONIC MIGRAINE W/O AURA W/O STATUS MIGRAINOSUS, NOT INTRACTABLE: ICD-10-CM

## 2023-02-13 RX ORDER — NORTRIPTYLINE HYDROCHLORIDE 10 MG/1
30 CAPSULE ORAL DAILY
Qty: 270 CAPSULE | Refills: 1 | Status: SHIPPED | OUTPATIENT
Start: 2023-02-13 | End: 2023-08-16 | Stop reason: SDUPTHER

## 2023-02-13 NOTE — TELEPHONE ENCOUNTER
Received request via: Patient    Was the patient seen in the last year in this department? No    Does the patient have an active prescription (recently filled or refills available) for medication(s) requested? Yes.     Does the patient have [facility] and need 100 day supply (blood pressure, diabetes and cholesterol meds only)? Patient does not have SCP

## 2023-02-20 DIAGNOSIS — I10 ESSENTIAL HYPERTENSION: ICD-10-CM

## 2023-02-20 RX ORDER — LOSARTAN POTASSIUM 50 MG/1
50 TABLET ORAL
Qty: 90 TABLET | Refills: 1 | Status: SHIPPED | OUTPATIENT
Start: 2023-02-20 | End: 2023-08-20 | Stop reason: SDUPTHER

## 2023-04-03 DIAGNOSIS — G43.019 INTRACTABLE MIGRAINE WITHOUT AURA AND WITHOUT STATUS MIGRAINOSUS: ICD-10-CM

## 2023-04-03 RX ORDER — PROPRANOLOL HCL 60 MG
60 CAPSULE, EXTENDED RELEASE 24HR ORAL DAILY
Qty: 90 CAPSULE | Refills: 1 | Status: SHIPPED | OUTPATIENT
Start: 2023-04-03

## 2023-05-03 ENCOUNTER — APPOINTMENT (RX ONLY)
Dept: URBAN - METROPOLITAN AREA CLINIC 6 | Facility: CLINIC | Age: 48
Setting detail: DERMATOLOGY
End: 2023-05-03

## 2023-05-03 DIAGNOSIS — D22 MELANOCYTIC NEVI: ICD-10-CM

## 2023-05-03 DIAGNOSIS — L57.0 ACTINIC KERATOSIS: ICD-10-CM

## 2023-05-03 DIAGNOSIS — R23.1 PALLOR: ICD-10-CM

## 2023-05-03 DIAGNOSIS — Z71.89 OTHER SPECIFIED COUNSELING: ICD-10-CM

## 2023-05-03 DIAGNOSIS — Z85.820 PERSONAL HISTORY OF MALIGNANT MELANOMA OF SKIN: ICD-10-CM

## 2023-05-03 DIAGNOSIS — L82.1 OTHER SEBORRHEIC KERATOSIS: ICD-10-CM

## 2023-05-03 PROBLEM — D22.71 MELANOCYTIC NEVI OF RIGHT LOWER LIMB, INCLUDING HIP: Status: ACTIVE | Noted: 2023-05-03

## 2023-05-03 PROBLEM — D22.39 MELANOCYTIC NEVI OF OTHER PARTS OF FACE: Status: ACTIVE | Noted: 2023-05-03

## 2023-05-03 PROBLEM — D22.61 MELANOCYTIC NEVI OF RIGHT UPPER LIMB, INCLUDING SHOULDER: Status: ACTIVE | Noted: 2023-05-03

## 2023-05-03 PROBLEM — D22.62 MELANOCYTIC NEVI OF LEFT UPPER LIMB, INCLUDING SHOULDER: Status: ACTIVE | Noted: 2023-05-03

## 2023-05-03 PROBLEM — D48.5 NEOPLASM OF UNCERTAIN BEHAVIOR OF SKIN: Status: ACTIVE | Noted: 2023-05-03

## 2023-05-03 PROBLEM — D23.62 OTHER BENIGN NEOPLASM OF SKIN OF LEFT UPPER LIMB, INCLUDING SHOULDER: Status: ACTIVE | Noted: 2023-05-03

## 2023-05-03 PROBLEM — D22.5 MELANOCYTIC NEVI OF TRUNK: Status: ACTIVE | Noted: 2023-05-03

## 2023-05-03 PROBLEM — D22.72 MELANOCYTIC NEVI OF LEFT LOWER LIMB, INCLUDING HIP: Status: ACTIVE | Noted: 2023-05-03

## 2023-05-03 PROCEDURE — 99213 OFFICE O/P EST LOW 20 MIN: CPT | Mod: 25

## 2023-05-03 PROCEDURE — ? LIQUID NITROGEN

## 2023-05-03 PROCEDURE — 17000 DESTRUCT PREMALG LESION: CPT | Mod: 59

## 2023-05-03 PROCEDURE — ? SUNSCREEN TREATMENT REGIMEN

## 2023-05-03 PROCEDURE — ? ADDITIONAL NOTES

## 2023-05-03 PROCEDURE — ? COUNSELING

## 2023-05-03 PROCEDURE — ? BIOPSY BY SHAVE METHOD

## 2023-05-03 PROCEDURE — 11102 TANGNTL BX SKIN SINGLE LES: CPT

## 2023-05-03 ASSESSMENT — LOCATION ZONE DERM
LOCATION ZONE: ARM
LOCATION ZONE: FACE
LOCATION ZONE: FEET
LOCATION ZONE: LEG
LOCATION ZONE: NOSE
LOCATION ZONE: TRUNK

## 2023-05-03 ASSESSMENT — LOCATION DETAILED DESCRIPTION DERM
LOCATION DETAILED: LEFT DORSAL FOOT
LOCATION DETAILED: RIGHT INFERIOR CENTRAL MALAR CHEEK
LOCATION DETAILED: RIGHT PROXIMAL POSTERIOR UPPER ARM
LOCATION DETAILED: LEFT PROXIMAL POSTERIOR UPPER ARM
LOCATION DETAILED: LOWER STERNUM
LOCATION DETAILED: RIGHT PROXIMAL PRETIBIAL REGION
LOCATION DETAILED: RIGHT ANTERIOR PROXIMAL THIGH
LOCATION DETAILED: SUPERIOR THORACIC SPINE
LOCATION DETAILED: LEFT MID-UPPER BACK
LOCATION DETAILED: LEFT ANTERIOR PROXIMAL THIGH
LOCATION DETAILED: LEFT PROXIMAL PRETIBIAL REGION
LOCATION DETAILED: RIGHT DORSAL FOOT
LOCATION DETAILED: RIGHT DISTAL PRETIBIAL REGION
LOCATION DETAILED: RIGHT DISTAL POSTERIOR UPPER ARM
LOCATION DETAILED: LEFT DISTAL POSTERIOR UPPER ARM
LOCATION DETAILED: LEFT DISTAL PRETIBIAL REGION
LOCATION DETAILED: LEFT NASAL SIDEWALL

## 2023-05-03 ASSESSMENT — LOCATION SIMPLE DESCRIPTION DERM
LOCATION SIMPLE: RIGHT FOOT
LOCATION SIMPLE: LEFT FOOT
LOCATION SIMPLE: RIGHT POSTERIOR UPPER ARM
LOCATION SIMPLE: RIGHT THIGH
LOCATION SIMPLE: LEFT UPPER BACK
LOCATION SIMPLE: UPPER BACK
LOCATION SIMPLE: LEFT POSTERIOR UPPER ARM
LOCATION SIMPLE: LEFT NOSE
LOCATION SIMPLE: RIGHT PRETIBIAL REGION
LOCATION SIMPLE: CHEST
LOCATION SIMPLE: RIGHT CHEEK
LOCATION SIMPLE: LEFT PRETIBIAL REGION
LOCATION SIMPLE: LEFT THIGH

## 2023-05-03 NOTE — PROCEDURE: SUNSCREEN TREATMENT REGIMEN
Patient accepted and admission order received from:: LEONOR DOBSON [[id number]]   Patient Class: Observation [4]   Patient on Telemetry: Yes   Has physician to physician communication occurred?: Yes   Transferring Patient to? Only adjust for transfers between [facility] ([facility], [facility], [facility]: [facility] [[id number]]  
Detail Level: Zone

## 2023-05-03 NOTE — PROCEDURE: ADDITIONAL NOTES
Detail Level: Simple
Render Risk Assessment In Note?: no
Additional Notes: No change
Additional Notes: Patient states PCP did full work up and she has seen vascular specialists. No known underlying connective tissue disorders or known vasculitis. Patient states it has been present for many years without significant change.
Additional Notes: Recommend biopsy of lesion on left nasal sidewall, Jennifer prefers 1 more treatment on LN2. If it doesn't resolve, she agrees to have a biopsy.

## 2023-05-03 NOTE — PROCEDURE: LIQUID NITROGEN
Render Note In Bullet Format When Appropriate: No
Show Applicator Variable?: Yes
Duration Of Freeze Thaw-Cycle (Seconds): 3
Post-Care Instructions: I reviewed with the patient in detail post-care instructions. Patient is to wear sunprotection, and avoid picking at any of the treated lesions. Pt may apply Vaseline to crusted or scabbing areas.
Detail Level: Detailed
Consent: The patient's consent was obtained including but not limited to risks of crusting, scabbing, blistering, scarring, darker or lighter pigmentary change, recurrence, incomplete removal and infection.

## 2023-05-24 ENCOUNTER — OFFICE VISIT (OUTPATIENT)
Dept: INTERNAL MEDICINE | Facility: IMAGING CENTER | Age: 48
End: 2023-05-24
Payer: COMMERCIAL

## 2023-05-24 VITALS
HEART RATE: 97 BPM | RESPIRATION RATE: 17 BRPM | OXYGEN SATURATION: 98 % | HEIGHT: 68 IN | DIASTOLIC BLOOD PRESSURE: 68 MMHG | SYSTOLIC BLOOD PRESSURE: 132 MMHG | WEIGHT: 268.96 LBS | BODY MASS INDEX: 40.76 KG/M2 | TEMPERATURE: 98.3 F

## 2023-05-24 DIAGNOSIS — F41.1 GAD (GENERALIZED ANXIETY DISORDER): ICD-10-CM

## 2023-05-24 PROCEDURE — 3078F DIAST BP <80 MM HG: CPT | Performed by: FAMILY MEDICINE

## 2023-05-24 PROCEDURE — 99213 OFFICE O/P EST LOW 20 MIN: CPT | Performed by: FAMILY MEDICINE

## 2023-05-24 PROCEDURE — 3075F SYST BP GE 130 - 139MM HG: CPT | Performed by: FAMILY MEDICINE

## 2023-05-24 RX ORDER — ESCITALOPRAM OXALATE 10 MG/1
10 TABLET ORAL DAILY
Qty: 30 TABLET | Refills: 3 | Status: SHIPPED | OUTPATIENT
Start: 2023-05-24

## 2023-05-24 ASSESSMENT — FIBROSIS 4 INDEX: FIB4 SCORE: 1.43

## 2023-05-24 ASSESSMENT — PATIENT HEALTH QUESTIONNAIRE - PHQ9: CLINICAL INTERPRETATION OF PHQ2 SCORE: 0

## 2023-05-24 NOTE — PROGRESS NOTES
"Chief Complaint   Patient presents with    Anxiety     Under a ton of stress. And has been having anxiety like symptoms.        HPI:  Patient is a 47 y.o. female established patient who presents today to discuss current new health concerns. She is under tremendous stress related to her job at the [employer] and her nine year dog was recently diagnosed with bone cancer. She reports that the combination of these two items has caused her to experience moments of racing heart beat, chest tightness, and panic/anxiousness that come on suddenly and resolve without intervention. She is likely in perimenopause due to experiencing hot flashes but has a Mirena IUD in place without normal menstrual cycle bleeding. She has not experienced this stressor combination in the past and seeking management options.     Patient Active Problem List    Diagnosis Date Noted    GENEVIEVE (generalized anxiety disorder) 05/24/2023    BMI 40.0-44.9, adult ([facility]) 09/26/2022    Malignant melanoma of back ([facility]) 06/2022    History of COVID-19 12/01/2020    Dry mouth 09/29/2020    Mixed dyslipidemia 09/13/2019    SVT (supraventricular tachycardia) ([facility]) 09/08/2019    Hypertension 09/08/2019    GERD (gastroesophageal reflux disease) 09/08/2019    Cervicogenic headache 12/12/2018    Migraine syndrome 03/28/2018       Past medical, surgical, family, and social history was reviewed and updated in Epic chart by me today.     Medications and allergies reviewed and updated in Epic chart by me today.     ROS:  Pertinent positives listed above in HPI. All other systems have been reviewed and are negative.    PE:   /68 (BP Location: Left arm, Patient Position: Sitting, BP Cuff Size: Adult)   Pulse 97   Temp 36.8 °C (98.3 °F) (Temporal)   Resp 17   Ht 1.727 m (5' 8\")   Wt 122 kg (268 lb 15.4 oz)   SpO2 98%   BMI 40.90 kg/m²   Vital signs reviewed with patient.     Gen: Well developed; well nourished; no acute distress; age appropriate appearance   CV: Regular " rate and rhythm; S1/ S2 present; no murmur, gallop or rub noted  Pulm: No respiratory distress; clear to ascultation b/l; no wheezing or stridor noted b/l  Extremities: No peripheral edema b/l LE extremities/ no clubbing nor cyanosis noted  Skin: Warm and dry; no rashes noted   Neuro: No focal deficits noted   Psych: AAOx4; mood and affect are appropriate/calm    A/P:  1. GENEVIEVE (generalized anxiety disorder)  New condition for patient as described above in HPI. Recommend patient start daily Lexapro use (risks and benefits of medication use covered in detail today) and follow clinical response. Patient also educated about symptoms that would necessitate immediate medication cessation. New RX sent to pharmacy, and recommend patient follow up with me in one month for ongoing management.   - escitalopram (LEXAPRO) 10 MG Tab; Take 1 Tablet by mouth every day.  Dispense: 30 Tablet; Refill: 3

## 2023-08-16 ENCOUNTER — APPOINTMENT (RX ONLY)
Dept: URBAN - METROPOLITAN AREA CLINIC 6 | Facility: CLINIC | Age: 48
Setting detail: DERMATOLOGY
End: 2023-08-16

## 2023-08-16 DIAGNOSIS — R23.1 PALLOR: ICD-10-CM

## 2023-08-16 DIAGNOSIS — L57.0 ACTINIC KERATOSIS: ICD-10-CM

## 2023-08-16 DIAGNOSIS — D18.0 HEMANGIOMA: ICD-10-CM

## 2023-08-16 DIAGNOSIS — L82.1 OTHER SEBORRHEIC KERATOSIS: ICD-10-CM

## 2023-08-16 DIAGNOSIS — G43.709 CHRONIC MIGRAINE W/O AURA W/O STATUS MIGRAINOSUS, NOT INTRACTABLE: ICD-10-CM

## 2023-08-16 DIAGNOSIS — L81.4 OTHER MELANIN HYPERPIGMENTATION: ICD-10-CM

## 2023-08-16 DIAGNOSIS — Z85.820 PERSONAL HISTORY OF MALIGNANT MELANOMA OF SKIN: ICD-10-CM

## 2023-08-16 DIAGNOSIS — Z71.89 OTHER SPECIFIED COUNSELING: ICD-10-CM

## 2023-08-16 DIAGNOSIS — D22 MELANOCYTIC NEVI: ICD-10-CM

## 2023-08-16 PROBLEM — D22.61 MELANOCYTIC NEVI OF RIGHT UPPER LIMB, INCLUDING SHOULDER: Status: ACTIVE | Noted: 2023-08-16

## 2023-08-16 PROBLEM — D22.62 MELANOCYTIC NEVI OF LEFT UPPER LIMB, INCLUDING SHOULDER: Status: ACTIVE | Noted: 2023-08-16

## 2023-08-16 PROBLEM — D23.62 OTHER BENIGN NEOPLASM OF SKIN OF LEFT UPPER LIMB, INCLUDING SHOULDER: Status: ACTIVE | Noted: 2023-08-16

## 2023-08-16 PROBLEM — D22.72 MELANOCYTIC NEVI OF LEFT LOWER LIMB, INCLUDING HIP: Status: ACTIVE | Noted: 2023-08-16

## 2023-08-16 PROBLEM — D22.5 MELANOCYTIC NEVI OF TRUNK: Status: ACTIVE | Noted: 2023-08-16

## 2023-08-16 PROBLEM — D18.01 HEMANGIOMA OF SKIN AND SUBCUTANEOUS TISSUE: Status: ACTIVE | Noted: 2023-08-16

## 2023-08-16 PROBLEM — D22.39 MELANOCYTIC NEVI OF OTHER PARTS OF FACE: Status: ACTIVE | Noted: 2023-08-16

## 2023-08-16 PROBLEM — D22.71 MELANOCYTIC NEVI OF RIGHT LOWER LIMB, INCLUDING HIP: Status: ACTIVE | Noted: 2023-08-16

## 2023-08-16 PROCEDURE — ? ADDITIONAL NOTES

## 2023-08-16 PROCEDURE — 99213 OFFICE O/P EST LOW 20 MIN: CPT | Mod: 25

## 2023-08-16 PROCEDURE — ? COUNSELING

## 2023-08-16 PROCEDURE — 17000 DESTRUCT PREMALG LESION: CPT

## 2023-08-16 PROCEDURE — 17003 DESTRUCT PREMALG LES 2-14: CPT

## 2023-08-16 PROCEDURE — ? LIQUID NITROGEN

## 2023-08-16 PROCEDURE — ? SUNSCREEN TREATMENT REGIMEN

## 2023-08-16 ASSESSMENT — LOCATION DETAILED DESCRIPTION DERM
LOCATION DETAILED: SUPERIOR THORACIC SPINE
LOCATION DETAILED: RIGHT INFERIOR CENTRAL MALAR CHEEK
LOCATION DETAILED: RIGHT PROXIMAL POSTERIOR UPPER ARM
LOCATION DETAILED: LEFT PROXIMAL PRETIBIAL REGION
LOCATION DETAILED: LEFT DISTAL POSTERIOR UPPER ARM
LOCATION DETAILED: LEFT DISTAL PRETIBIAL REGION
LOCATION DETAILED: LEFT MID-UPPER BACK
LOCATION DETAILED: RIGHT MEDIAL UPPER BACK
LOCATION DETAILED: RIGHT PROXIMAL PRETIBIAL REGION
LOCATION DETAILED: LEFT DORSAL FOOT
LOCATION DETAILED: RIGHT ANTERIOR PROXIMAL THIGH
LOCATION DETAILED: LEFT PROXIMAL DORSAL FOREARM
LOCATION DETAILED: LOWER STERNUM
LOCATION DETAILED: RIGHT DISTAL PRETIBIAL REGION
LOCATION DETAILED: LEFT INFERIOR MEDIAL MALAR CHEEK
LOCATION DETAILED: RIGHT PROXIMAL DORSAL FOREARM
LOCATION DETAILED: RIGHT DORSAL FOOT
LOCATION DETAILED: LEFT ANTERIOR PROXIMAL THIGH
LOCATION DETAILED: LEFT PROXIMAL POSTERIOR UPPER ARM
LOCATION DETAILED: LEFT MEDIAL SUPERIOR CHEST
LOCATION DETAILED: RIGHT DISTAL POSTERIOR UPPER ARM

## 2023-08-16 ASSESSMENT — LOCATION SIMPLE DESCRIPTION DERM
LOCATION SIMPLE: RIGHT FOREARM
LOCATION SIMPLE: LEFT CHEEK
LOCATION SIMPLE: CHEST
LOCATION SIMPLE: LEFT THIGH
LOCATION SIMPLE: LEFT FOREARM
LOCATION SIMPLE: LEFT POSTERIOR UPPER ARM
LOCATION SIMPLE: RIGHT UPPER BACK
LOCATION SIMPLE: RIGHT CHEEK
LOCATION SIMPLE: LEFT PRETIBIAL REGION
LOCATION SIMPLE: RIGHT POSTERIOR UPPER ARM
LOCATION SIMPLE: LEFT FOOT
LOCATION SIMPLE: LEFT UPPER BACK
LOCATION SIMPLE: RIGHT FOOT
LOCATION SIMPLE: RIGHT PRETIBIAL REGION
LOCATION SIMPLE: RIGHT THIGH
LOCATION SIMPLE: UPPER BACK

## 2023-08-16 ASSESSMENT — LOCATION ZONE DERM
LOCATION ZONE: ARM
LOCATION ZONE: TRUNK
LOCATION ZONE: LEG
LOCATION ZONE: FEET
LOCATION ZONE: FACE

## 2023-08-16 NOTE — PROCEDURE: LIQUID NITROGEN
Show Applicator Variable?: Yes
Render Note In Bullet Format When Appropriate: No
Duration Of Freeze Thaw-Cycle (Seconds): 3
Post-Care Instructions: I reviewed with the patient in detail post-care instructions. Patient is to wear sunprotection, and avoid picking at any of the treated lesions. Pt may apply Vaseline to crusted or scabbing areas.
Consent: The patient's consent was obtained including but not limited to risks of crusting, scabbing, blistering, scarring, darker or lighter pigmentary change, recurrence, incomplete removal and infection.
Detail Level: Detailed

## 2023-08-17 RX ORDER — NORTRIPTYLINE HYDROCHLORIDE 10 MG/1
30 CAPSULE ORAL DAILY
Qty: 270 CAPSULE | Refills: 1 | Status: SHIPPED | OUTPATIENT
Start: 2023-08-17

## 2023-11-07 ENCOUNTER — APPOINTMENT (RX ONLY)
Dept: URBAN - METROPOLITAN AREA CLINIC 6 | Facility: CLINIC | Age: 48
Setting detail: DERMATOLOGY
End: 2023-11-07

## 2023-11-07 DIAGNOSIS — R23.1 PALLOR: ICD-10-CM

## 2023-11-07 DIAGNOSIS — D22 MELANOCYTIC NEVI: ICD-10-CM

## 2023-11-07 DIAGNOSIS — L85.8 OTHER SPECIFIED EPIDERMAL THICKENING: ICD-10-CM

## 2023-11-07 DIAGNOSIS — L82.1 OTHER SEBORRHEIC KERATOSIS: ICD-10-CM

## 2023-11-07 DIAGNOSIS — L259 CONTACT DERMATITIS AND OTHER ECZEMA, UNSPECIFIED CAUSE: ICD-10-CM

## 2023-11-07 DIAGNOSIS — Z71.89 OTHER SPECIFIED COUNSELING: ICD-10-CM

## 2023-11-07 DIAGNOSIS — Z85.820 PERSONAL HISTORY OF MALIGNANT MELANOMA OF SKIN: ICD-10-CM

## 2023-11-07 DIAGNOSIS — L81.4 OTHER MELANIN HYPERPIGMENTATION: ICD-10-CM

## 2023-11-07 DIAGNOSIS — D18.0 HEMANGIOMA: ICD-10-CM

## 2023-11-07 DIAGNOSIS — L57.0 ACTINIC KERATOSIS: ICD-10-CM

## 2023-11-07 PROBLEM — D22.62 MELANOCYTIC NEVI OF LEFT UPPER LIMB, INCLUDING SHOULDER: Status: ACTIVE | Noted: 2023-11-07

## 2023-11-07 PROBLEM — D22.5 MELANOCYTIC NEVI OF TRUNK: Status: ACTIVE | Noted: 2023-11-07

## 2023-11-07 PROBLEM — D18.01 HEMANGIOMA OF SKIN AND SUBCUTANEOUS TISSUE: Status: ACTIVE | Noted: 2023-11-07

## 2023-11-07 PROBLEM — D22.61 MELANOCYTIC NEVI OF RIGHT UPPER LIMB, INCLUDING SHOULDER: Status: ACTIVE | Noted: 2023-11-07

## 2023-11-07 PROBLEM — D22.71 MELANOCYTIC NEVI OF RIGHT LOWER LIMB, INCLUDING HIP: Status: ACTIVE | Noted: 2023-11-07

## 2023-11-07 PROBLEM — D22.39 MELANOCYTIC NEVI OF OTHER PARTS OF FACE: Status: ACTIVE | Noted: 2023-11-07

## 2023-11-07 PROBLEM — D23.62 OTHER BENIGN NEOPLASM OF SKIN OF LEFT UPPER LIMB, INCLUDING SHOULDER: Status: ACTIVE | Noted: 2023-11-07

## 2023-11-07 PROBLEM — D22.72 MELANOCYTIC NEVI OF LEFT LOWER LIMB, INCLUDING HIP: Status: ACTIVE | Noted: 2023-11-07

## 2023-11-07 PROBLEM — D48.5 NEOPLASM OF UNCERTAIN BEHAVIOR OF SKIN: Status: ACTIVE | Noted: 2023-11-07

## 2023-11-07 PROBLEM — L30.9 DERMATITIS, UNSPECIFIED: Status: ACTIVE | Noted: 2023-11-07

## 2023-11-07 PROCEDURE — ? ADDITIONAL NOTES

## 2023-11-07 PROCEDURE — ? BIOPSY BY SHAVE METHOD

## 2023-11-07 PROCEDURE — 11102 TANGNTL BX SKIN SINGLE LES: CPT

## 2023-11-07 PROCEDURE — 11103 TANGNTL BX SKIN EA SEP/ADDL: CPT

## 2023-11-07 PROCEDURE — 17000 DESTRUCT PREMALG LESION: CPT | Mod: 59

## 2023-11-07 PROCEDURE — 99213 OFFICE O/P EST LOW 20 MIN: CPT | Mod: 25

## 2023-11-07 PROCEDURE — ? SUNSCREEN TREATMENT REGIMEN

## 2023-11-07 PROCEDURE — ? COUNSELING

## 2023-11-07 PROCEDURE — ? LIQUID NITROGEN

## 2023-11-07 PROCEDURE — ? DIAGNOSIS COMMENT

## 2023-11-07 ASSESSMENT — LOCATION ZONE DERM
LOCATION ZONE: FINGER
LOCATION ZONE: TRUNK
LOCATION ZONE: FACE
LOCATION ZONE: FEET
LOCATION ZONE: LEG
LOCATION ZONE: HAND
LOCATION ZONE: ARM

## 2023-11-07 ASSESSMENT — LOCATION DETAILED DESCRIPTION DERM
LOCATION DETAILED: RIGHT MEDIAL UPPER BACK
LOCATION DETAILED: RIGHT DORSAL FOOT
LOCATION DETAILED: RIGHT PROXIMAL POSTERIOR UPPER ARM
LOCATION DETAILED: RIGHT DISTAL POSTERIOR UPPER ARM
LOCATION DETAILED: LEFT INFERIOR MEDIAL MALAR CHEEK
LOCATION DETAILED: LOWER STERNUM
LOCATION DETAILED: RIGHT INFERIOR CENTRAL MALAR CHEEK
LOCATION DETAILED: LEFT MID-UPPER BACK
LOCATION DETAILED: LEFT PROXIMAL POSTERIOR UPPER ARM
LOCATION DETAILED: LEFT DISTAL POSTERIOR UPPER ARM
LOCATION DETAILED: SUPERIOR THORACIC SPINE
LOCATION DETAILED: LEFT ANTERIOR PROXIMAL THIGH
LOCATION DETAILED: RIGHT CLAVICULAR SKIN
LOCATION DETAILED: RIGHT DISTAL PRETIBIAL REGION
LOCATION DETAILED: LEFT DORSAL FOOT
LOCATION DETAILED: LEFT PROXIMAL PRETIBIAL REGION
LOCATION DETAILED: LEFT DISTAL ULNAR THUMB
LOCATION DETAILED: RIGHT ULNAR PALM
LOCATION DETAILED: RIGHT PROXIMAL DORSAL FOREARM
LOCATION DETAILED: LEFT AREOLA
LOCATION DETAILED: RIGHT PROXIMAL PRETIBIAL REGION
LOCATION DETAILED: RIGHT ANTERIOR PROXIMAL THIGH
LOCATION DETAILED: LEFT PROXIMAL DORSAL FOREARM
LOCATION DETAILED: LEFT DISTAL PRETIBIAL REGION

## 2023-11-07 ASSESSMENT — LOCATION SIMPLE DESCRIPTION DERM
LOCATION SIMPLE: LEFT POSTERIOR UPPER ARM
LOCATION SIMPLE: LEFT UPPER BACK
LOCATION SIMPLE: LEFT THIGH
LOCATION SIMPLE: LEFT PRETIBIAL REGION
LOCATION SIMPLE: RIGHT UPPER BACK
LOCATION SIMPLE: RIGHT CLAVICULAR SKIN
LOCATION SIMPLE: RIGHT THIGH
LOCATION SIMPLE: RIGHT FOOT
LOCATION SIMPLE: LEFT BREAST
LOCATION SIMPLE: RIGHT CHEEK
LOCATION SIMPLE: LEFT CHEEK
LOCATION SIMPLE: LEFT FOOT
LOCATION SIMPLE: RIGHT POSTERIOR UPPER ARM
LOCATION SIMPLE: LEFT FOREARM
LOCATION SIMPLE: RIGHT PRETIBIAL REGION
LOCATION SIMPLE: RIGHT FOREARM
LOCATION SIMPLE: RIGHT HAND
LOCATION SIMPLE: UPPER BACK
LOCATION SIMPLE: CHEST
LOCATION SIMPLE: LEFT THUMB

## 2023-11-07 NOTE — PROCEDURE: ADDITIONAL NOTES
Detail Level: Simple
Render Risk Assessment In Note?: no
Additional Notes: No change
Additional Notes: Patient states PCP did full work up and she has seen vascular specialists. No known underlying connective tissue disorders, underlying malignancy or known vasculitis. Patient states it has been present for many years without significant change.
Additional Notes: Recurrent erythematous patch left areola, tends to respond to topical steroids however hasn't completely resolved. Will biopsy today. Jennifer has routine mammograms which have been normal.
Additional Notes: verruca vulgaris considered. Discussed OTC products and in offcie treatments. Also offered biopsy, she declines. Jennifer declines treatment at this time, she will call if lesions worsen or become symptomatic.

## 2023-11-13 ENCOUNTER — HOSPITAL ENCOUNTER (OUTPATIENT)
Dept: LAB | Facility: MEDICAL CENTER | Age: 48
End: 2023-11-13
Attending: FAMILY MEDICINE
Payer: COMMERCIAL

## 2023-11-13 LAB
25(OH)D3 SERPL-MCNC: 38 NG/ML (ref 30–100)
ALBUMIN SERPL BCP-MCNC: 4.5 G/DL (ref 3.2–4.9)
ALBUMIN/GLOB SERPL: 1.6 G/DL
ALP SERPL-CCNC: 83 U/L (ref 30–99)
ALT SERPL-CCNC: 61 U/L (ref 2–50)
ANION GAP SERPL CALC-SCNC: 12 MMOL/L (ref 7–16)
AST SERPL-CCNC: 50 U/L (ref 12–45)
BASOPHILS # BLD AUTO: 0.8 % (ref 0–1.8)
BASOPHILS # BLD: 0.05 K/UL (ref 0–0.12)
BILIRUB SERPL-MCNC: 0.9 MG/DL (ref 0.1–1.5)
BUN SERPL-MCNC: 17 MG/DL (ref 8–22)
CALCIUM ALBUM COR SERPL-MCNC: 9 MG/DL (ref 8.5–10.5)
CALCIUM SERPL-MCNC: 9.4 MG/DL (ref 8.5–10.5)
CHLORIDE SERPL-SCNC: 100 MMOL/L (ref 96–112)
CHOLEST SERPL-MCNC: 195 MG/DL (ref 100–199)
CO2 SERPL-SCNC: 24 MMOL/L (ref 20–33)
CREAT SERPL-MCNC: 0.71 MG/DL (ref 0.5–1.4)
EOSINOPHIL # BLD AUTO: 0.15 K/UL (ref 0–0.51)
EOSINOPHIL NFR BLD: 2.4 % (ref 0–6.9)
ERYTHROCYTE [DISTWIDTH] IN BLOOD BY AUTOMATED COUNT: 45.6 FL (ref 35.9–50)
EST. AVERAGE GLUCOSE BLD GHB EST-MCNC: 120 MG/DL
FASTING STATUS PATIENT QL REPORTED: NORMAL
GFR SERPLBLD CREATININE-BSD FMLA CKD-EPI: 105 ML/MIN/1.73 M 2
GLOBULIN SER CALC-MCNC: 2.9 G/DL (ref 1.9–3.5)
GLUCOSE SERPL-MCNC: 109 MG/DL (ref 65–99)
HBA1C MFR BLD: 5.8 % (ref 4–5.6)
HCT VFR BLD AUTO: 45.7 % (ref 37–47)
HDLC SERPL-MCNC: 47 MG/DL
HGB BLD-MCNC: 14.7 G/DL (ref 12–16)
IMM GRANULOCYTES # BLD AUTO: 0.02 K/UL (ref 0–0.11)
IMM GRANULOCYTES NFR BLD AUTO: 0.3 % (ref 0–0.9)
LDLC SERPL CALC-MCNC: 115 MG/DL
LYMPHOCYTES # BLD AUTO: 1.36 K/UL (ref 1–4.8)
LYMPHOCYTES NFR BLD: 22 % (ref 22–41)
MCH RBC QN AUTO: 30.2 PG (ref 27–33)
MCHC RBC AUTO-ENTMCNC: 32.2 G/DL (ref 32.2–35.5)
MCV RBC AUTO: 94 FL (ref 81.4–97.8)
MONOCYTES # BLD AUTO: 0.57 K/UL (ref 0–0.85)
MONOCYTES NFR BLD AUTO: 9.2 % (ref 0–13.4)
NEUTROPHILS # BLD AUTO: 4.03 K/UL (ref 1.82–7.42)
NEUTROPHILS NFR BLD: 65.3 % (ref 44–72)
NRBC # BLD AUTO: 0 K/UL
NRBC BLD-RTO: 0 /100 WBC (ref 0–0.2)
PLATELET # BLD AUTO: 184 K/UL (ref 164–446)
PMV BLD AUTO: 11.3 FL (ref 9–12.9)
POTASSIUM SERPL-SCNC: 4 MMOL/L (ref 3.6–5.5)
PROT SERPL-MCNC: 7.4 G/DL (ref 6–8.2)
RBC # BLD AUTO: 4.86 M/UL (ref 4.2–5.4)
SODIUM SERPL-SCNC: 136 MMOL/L (ref 135–145)
T3 SERPL-MCNC: 136 NG/DL (ref 60–181)
T4 FREE SERPL-MCNC: 1.03 NG/DL (ref 0.93–1.7)
TRIGL SERPL-MCNC: 166 MG/DL (ref 0–149)
TSH SERPL DL<=0.005 MIU/L-ACNC: 1.42 UIU/ML (ref 0.38–5.33)
WBC # BLD AUTO: 6.2 K/UL (ref 4.8–10.8)

## 2023-11-13 PROCEDURE — 83883 ASSAY NEPHELOMETRY NOT SPEC: CPT

## 2023-11-13 PROCEDURE — 82977 ASSAY OF GGT: CPT

## 2023-11-13 PROCEDURE — 84439 ASSAY OF FREE THYROXINE: CPT

## 2023-11-13 PROCEDURE — 84478 ASSAY OF TRIGLYCERIDES: CPT

## 2023-11-13 PROCEDURE — 84480 ASSAY TRIIODOTHYRONINE (T3): CPT

## 2023-11-13 PROCEDURE — 80053 COMPREHEN METABOLIC PANEL: CPT

## 2023-11-13 PROCEDURE — 80061 LIPID PANEL: CPT

## 2023-11-13 PROCEDURE — 83036 HEMOGLOBIN GLYCOSYLATED A1C: CPT

## 2023-11-13 PROCEDURE — 83010 ASSAY OF HAPTOGLOBIN QUANT: CPT

## 2023-11-13 PROCEDURE — 36415 COLL VENOUS BLD VENIPUNCTURE: CPT

## 2023-11-13 PROCEDURE — 82306 VITAMIN D 25 HYDROXY: CPT

## 2023-11-13 PROCEDURE — 85025 COMPLETE CBC W/AUTO DIFF WBC: CPT

## 2023-11-13 PROCEDURE — 84460 ALANINE AMINO (ALT) (SGPT): CPT

## 2023-11-13 PROCEDURE — 84450 TRANSFERASE (AST) (SGOT): CPT

## 2023-11-13 PROCEDURE — 82172 ASSAY OF APOLIPOPROTEIN: CPT

## 2023-11-13 PROCEDURE — 82247 BILIRUBIN TOTAL: CPT

## 2023-11-13 PROCEDURE — 82947 ASSAY GLUCOSE BLOOD QUANT: CPT

## 2023-11-13 PROCEDURE — 82465 ASSAY BLD/SERUM CHOLESTEROL: CPT

## 2023-11-13 PROCEDURE — 84443 ASSAY THYROID STIM HORMONE: CPT

## 2023-11-16 LAB
A2 MACROGLOB SERPL-MCNC: 187 MG/DL (ref 110–276)
ALT SERPL W P-5'-P-CCNC: 64 IU/L (ref 0–40)
APO A-I SERPL-MCNC: 153 MG/DL (ref 116–209)
AST SERPL W P-5'-P-CCNC: 60 IU/L (ref 0–40)
BILIRUB SERPL-MCNC: 0.9 MG/DL (ref 0–1.2)
CHOLEST SERPL-MCNC: 212 MG/DL (ref 100–199)
FIBROSIS SCORING 550107: ABNORMAL
FIBROSIS STAGE SERPL QL: ABNORMAL
GGT SERPL-CCNC: 117 IU/L (ref 0–60)
GLUCOSE SERPL-MCNC: 113 MG/DL (ref 70–99)
HAPTOGLOB SERPL-MCNC: 125 MG/DL (ref 42–296)
LABORATORY COMMENT REPORT: ABNORMAL
LIVER FIBR SCORE SERPL CALC.FIBROSURE: 0.32 (ref 0–0.21)
LIVER STEATOSIS GRADE SERPL QL: ABNORMAL
LIVER STEATOSIS SCORE SERPL: 0.78 (ref 0–0.4)
NASH GRADE SERPL QL: ABNORMAL
NASH INTERPRETATION SERPL-IMP: ABNORMAL
NASH SCORE SERPL: 0.79 (ref 0–0.25)
NASH SCORING Z4789: ABNORMAL
STEATOSIS SCORING NL11718: ABNORMAL
TEST PERFORMANCE INFO SPEC: ABNORMAL
TEST PERFORMANCE INFO SPEC: ABNORMAL
TRIGL SERPL-MCNC: 180 MG/DL (ref 0–149)

## 2024-02-06 ENCOUNTER — APPOINTMENT (RX ONLY)
Dept: URBAN - METROPOLITAN AREA CLINIC 6 | Facility: CLINIC | Age: 49
Setting detail: DERMATOLOGY
End: 2024-02-06

## 2024-02-06 DIAGNOSIS — Z85.820 PERSONAL HISTORY OF MALIGNANT MELANOMA OF SKIN: ICD-10-CM

## 2024-02-06 DIAGNOSIS — L81.4 OTHER MELANIN HYPERPIGMENTATION: ICD-10-CM

## 2024-02-06 DIAGNOSIS — Z71.89 OTHER SPECIFIED COUNSELING: ICD-10-CM

## 2024-02-06 DIAGNOSIS — R23.1 PALLOR: ICD-10-CM

## 2024-02-06 DIAGNOSIS — L82.1 OTHER SEBORRHEIC KERATOSIS: ICD-10-CM

## 2024-02-06 DIAGNOSIS — D22 MELANOCYTIC NEVI: ICD-10-CM

## 2024-02-06 DIAGNOSIS — D18.0 HEMANGIOMA: ICD-10-CM

## 2024-02-06 PROBLEM — D23.62 OTHER BENIGN NEOPLASM OF SKIN OF LEFT UPPER LIMB, INCLUDING SHOULDER: Status: ACTIVE | Noted: 2024-02-06

## 2024-02-06 PROBLEM — D22.72 MELANOCYTIC NEVI OF LEFT LOWER LIMB, INCLUDING HIP: Status: ACTIVE | Noted: 2024-02-06

## 2024-02-06 PROBLEM — D18.01 HEMANGIOMA OF SKIN AND SUBCUTANEOUS TISSUE: Status: ACTIVE | Noted: 2024-02-06

## 2024-02-06 PROBLEM — D22.62 MELANOCYTIC NEVI OF LEFT UPPER LIMB, INCLUDING SHOULDER: Status: ACTIVE | Noted: 2024-02-06

## 2024-02-06 PROBLEM — D22.71 MELANOCYTIC NEVI OF RIGHT LOWER LIMB, INCLUDING HIP: Status: ACTIVE | Noted: 2024-02-06

## 2024-02-06 PROBLEM — D48.5 NEOPLASM OF UNCERTAIN BEHAVIOR OF SKIN: Status: ACTIVE | Noted: 2024-02-06

## 2024-02-06 PROBLEM — D22.39 MELANOCYTIC NEVI OF OTHER PARTS OF FACE: Status: ACTIVE | Noted: 2024-02-06

## 2024-02-06 PROBLEM — D22.5 MELANOCYTIC NEVI OF TRUNK: Status: ACTIVE | Noted: 2024-02-06

## 2024-02-06 PROBLEM — D22.61 MELANOCYTIC NEVI OF RIGHT UPPER LIMB, INCLUDING SHOULDER: Status: ACTIVE | Noted: 2024-02-06

## 2024-02-06 PROCEDURE — 99213 OFFICE O/P EST LOW 20 MIN: CPT | Mod: 25

## 2024-02-06 PROCEDURE — ? COUNSELING

## 2024-02-06 PROCEDURE — ? ADDITIONAL NOTES

## 2024-02-06 PROCEDURE — 11102 TANGNTL BX SKIN SINGLE LES: CPT

## 2024-02-06 PROCEDURE — ? SUNSCREEN TREATMENT REGIMEN

## 2024-02-06 PROCEDURE — ? DIAGNOSIS COMMENT

## 2024-02-06 PROCEDURE — ? BIOPSY BY SHAVE METHOD

## 2024-02-06 ASSESSMENT — LOCATION SIMPLE DESCRIPTION DERM
LOCATION SIMPLE: RIGHT FOOT
LOCATION SIMPLE: RIGHT CHEEK
LOCATION SIMPLE: RIGHT PRETIBIAL REGION
LOCATION SIMPLE: RIGHT UPPER BACK
LOCATION SIMPLE: LEFT FOREARM
LOCATION SIMPLE: LEFT POSTERIOR UPPER ARM
LOCATION SIMPLE: LEFT CHEEK
LOCATION SIMPLE: LEFT UPPER BACK
LOCATION SIMPLE: RIGHT THIGH
LOCATION SIMPLE: LEFT FOOT
LOCATION SIMPLE: LEFT THIGH
LOCATION SIMPLE: UPPER BACK
LOCATION SIMPLE: RIGHT POSTERIOR UPPER ARM
LOCATION SIMPLE: LEFT PRETIBIAL REGION
LOCATION SIMPLE: RIGHT FOREARM
LOCATION SIMPLE: CHEST

## 2024-02-06 ASSESSMENT — LOCATION DETAILED DESCRIPTION DERM
LOCATION DETAILED: LEFT MID-UPPER BACK
LOCATION DETAILED: SUPERIOR THORACIC SPINE
LOCATION DETAILED: LEFT PROXIMAL DORSAL FOREARM
LOCATION DETAILED: RIGHT ANTERIOR PROXIMAL THIGH
LOCATION DETAILED: RIGHT PROXIMAL PRETIBIAL REGION
LOCATION DETAILED: RIGHT DORSAL FOOT
LOCATION DETAILED: LEFT PROXIMAL PRETIBIAL REGION
LOCATION DETAILED: LEFT DISTAL PRETIBIAL REGION
LOCATION DETAILED: RIGHT INFERIOR CENTRAL MALAR CHEEK
LOCATION DETAILED: LEFT INFERIOR MEDIAL MALAR CHEEK
LOCATION DETAILED: LEFT DISTAL POSTERIOR UPPER ARM
LOCATION DETAILED: RIGHT DISTAL POSTERIOR UPPER ARM
LOCATION DETAILED: LEFT PROXIMAL POSTERIOR UPPER ARM
LOCATION DETAILED: LEFT DORSAL FOOT
LOCATION DETAILED: LEFT ANTERIOR PROXIMAL THIGH
LOCATION DETAILED: RIGHT PROXIMAL POSTERIOR UPPER ARM
LOCATION DETAILED: RIGHT MEDIAL UPPER BACK
LOCATION DETAILED: LOWER STERNUM
LOCATION DETAILED: RIGHT PROXIMAL DORSAL FOREARM
LOCATION DETAILED: RIGHT DISTAL PRETIBIAL REGION

## 2024-02-06 ASSESSMENT — LOCATION ZONE DERM
LOCATION ZONE: FEET
LOCATION ZONE: TRUNK
LOCATION ZONE: LEG
LOCATION ZONE: ARM
LOCATION ZONE: FACE

## 2024-02-06 NOTE — PROCEDURE: ADDITIONAL NOTES
Detail Level: Simple
Render Risk Assessment In Note?: no
Additional Notes: No change
Additional Notes: Patient states PCP did full work up and she has seen vascular specialists. No known underlying connective tissue disorders, underlying malignancy or known vasculitis. Patient states it has been present for many years without significant change.
Additional Notes: Up to date on routine health maintenance exams\\nROS negative

## 2024-02-13 ENCOUNTER — HOSPITAL ENCOUNTER (OUTPATIENT)
Dept: RADIOLOGY | Facility: MEDICAL CENTER | Age: 49
End: 2024-02-13
Attending: FAMILY MEDICINE
Payer: COMMERCIAL

## 2024-02-13 DIAGNOSIS — Z12.31 VISIT FOR SCREENING MAMMOGRAM: ICD-10-CM

## 2024-02-13 PROCEDURE — 77067 SCR MAMMO BI INCL CAD: CPT

## 2024-04-16 ENCOUNTER — HOSPITAL ENCOUNTER (OUTPATIENT)
Dept: CARDIOLOGY | Facility: MEDICAL CENTER | Age: 49
End: 2024-04-16
Attending: ANESTHESIOLOGY
Payer: COMMERCIAL

## 2024-04-16 PROCEDURE — 93005 ELECTROCARDIOGRAM TRACING: CPT | Performed by: ANESTHESIOLOGY

## 2024-04-17 LAB — EKG IMPRESSION: NORMAL

## 2024-04-17 PROCEDURE — 93010 ELECTROCARDIOGRAM REPORT: CPT | Performed by: INTERNAL MEDICINE

## 2024-05-01 ENCOUNTER — HOSPITAL ENCOUNTER (OUTPATIENT)
Dept: LAB | Facility: MEDICAL CENTER | Age: 49
End: 2024-05-01
Attending: FAMILY MEDICINE
Payer: COMMERCIAL

## 2024-05-01 LAB
CHOLEST SERPL-MCNC: 182 MG/DL (ref 100–199)
FASTING STATUS PATIENT QL REPORTED: NORMAL
HDLC SERPL-MCNC: 42 MG/DL
LDLC SERPL CALC-MCNC: 109 MG/DL
TRIGL SERPL-MCNC: 156 MG/DL (ref 0–149)

## 2024-05-07 ENCOUNTER — APPOINTMENT (RX ONLY)
Dept: URBAN - METROPOLITAN AREA CLINIC 6 | Facility: CLINIC | Age: 49
Setting detail: DERMATOLOGY
End: 2024-05-07

## 2024-05-07 DIAGNOSIS — Z71.89 OTHER SPECIFIED COUNSELING: ICD-10-CM

## 2024-05-07 DIAGNOSIS — D22 MELANOCYTIC NEVI: ICD-10-CM

## 2024-05-07 DIAGNOSIS — L81.4 OTHER MELANIN HYPERPIGMENTATION: ICD-10-CM

## 2024-05-07 DIAGNOSIS — L82.1 OTHER SEBORRHEIC KERATOSIS: ICD-10-CM

## 2024-05-07 DIAGNOSIS — D18.0 HEMANGIOMA: ICD-10-CM

## 2024-05-07 DIAGNOSIS — Z85.820 PERSONAL HISTORY OF MALIGNANT MELANOMA OF SKIN: ICD-10-CM

## 2024-05-07 DIAGNOSIS — R23.1 PALLOR: ICD-10-CM

## 2024-05-07 PROBLEM — D22.71 MELANOCYTIC NEVI OF RIGHT LOWER LIMB, INCLUDING HIP: Status: ACTIVE | Noted: 2024-05-07

## 2024-05-07 PROBLEM — D22.61 MELANOCYTIC NEVI OF RIGHT UPPER LIMB, INCLUDING SHOULDER: Status: ACTIVE | Noted: 2024-05-07

## 2024-05-07 PROBLEM — D22.62 MELANOCYTIC NEVI OF LEFT UPPER LIMB, INCLUDING SHOULDER: Status: ACTIVE | Noted: 2024-05-07

## 2024-05-07 PROBLEM — D22.5 MELANOCYTIC NEVI OF TRUNK: Status: ACTIVE | Noted: 2024-05-07

## 2024-05-07 PROBLEM — D22.39 MELANOCYTIC NEVI OF OTHER PARTS OF FACE: Status: ACTIVE | Noted: 2024-05-07

## 2024-05-07 PROBLEM — D48.5 NEOPLASM OF UNCERTAIN BEHAVIOR OF SKIN: Status: ACTIVE | Noted: 2024-05-07

## 2024-05-07 PROBLEM — D18.01 HEMANGIOMA OF SKIN AND SUBCUTANEOUS TISSUE: Status: ACTIVE | Noted: 2024-05-07

## 2024-05-07 PROBLEM — D22.72 MELANOCYTIC NEVI OF LEFT LOWER LIMB, INCLUDING HIP: Status: ACTIVE | Noted: 2024-05-07

## 2024-05-07 PROBLEM — D23.62 OTHER BENIGN NEOPLASM OF SKIN OF LEFT UPPER LIMB, INCLUDING SHOULDER: Status: ACTIVE | Noted: 2024-05-07

## 2024-05-07 PROCEDURE — ? DIAGNOSIS COMMENT

## 2024-05-07 PROCEDURE — ? SUNSCREEN TREATMENT REGIMEN

## 2024-05-07 PROCEDURE — ? BIOPSY BY SHAVE METHOD

## 2024-05-07 PROCEDURE — ? COUNSELING

## 2024-05-07 PROCEDURE — 99213 OFFICE O/P EST LOW 20 MIN: CPT | Mod: 25

## 2024-05-07 PROCEDURE — ? ADDITIONAL NOTES

## 2024-05-07 PROCEDURE — 11102 TANGNTL BX SKIN SINGLE LES: CPT

## 2024-05-07 ASSESSMENT — LOCATION SIMPLE DESCRIPTION DERM
LOCATION SIMPLE: RIGHT FOOT
LOCATION SIMPLE: RIGHT FOREARM
LOCATION SIMPLE: RIGHT POSTERIOR UPPER ARM
LOCATION SIMPLE: RIGHT UPPER BACK
LOCATION SIMPLE: LEFT FOOT
LOCATION SIMPLE: LEFT POSTERIOR UPPER ARM
LOCATION SIMPLE: LEFT FOREARM
LOCATION SIMPLE: CHEST
LOCATION SIMPLE: LEFT PRETIBIAL REGION
LOCATION SIMPLE: LEFT CHEEK
LOCATION SIMPLE: LEFT UPPER BACK
LOCATION SIMPLE: UPPER BACK
LOCATION SIMPLE: LEFT THIGH
LOCATION SIMPLE: RIGHT THIGH
LOCATION SIMPLE: RIGHT CHEEK
LOCATION SIMPLE: RIGHT PRETIBIAL REGION

## 2024-05-07 ASSESSMENT — LOCATION DETAILED DESCRIPTION DERM
LOCATION DETAILED: LEFT MID-UPPER BACK
LOCATION DETAILED: RIGHT ANTERIOR PROXIMAL THIGH
LOCATION DETAILED: LOWER STERNUM
LOCATION DETAILED: LEFT PROXIMAL POSTERIOR UPPER ARM
LOCATION DETAILED: RIGHT DISTAL PRETIBIAL REGION
LOCATION DETAILED: LEFT PROXIMAL PRETIBIAL REGION
LOCATION DETAILED: SUPERIOR THORACIC SPINE
LOCATION DETAILED: LEFT PROXIMAL DORSAL FOREARM
LOCATION DETAILED: LEFT DISTAL POSTERIOR UPPER ARM
LOCATION DETAILED: RIGHT PROXIMAL DORSAL FOREARM
LOCATION DETAILED: RIGHT PROXIMAL PRETIBIAL REGION
LOCATION DETAILED: LEFT DISTAL PRETIBIAL REGION
LOCATION DETAILED: RIGHT INFERIOR CENTRAL MALAR CHEEK
LOCATION DETAILED: RIGHT DISTAL POSTERIOR UPPER ARM
LOCATION DETAILED: LEFT ANTERIOR PROXIMAL THIGH
LOCATION DETAILED: RIGHT MEDIAL UPPER BACK
LOCATION DETAILED: LEFT DORSAL FOOT
LOCATION DETAILED: RIGHT PROXIMAL POSTERIOR UPPER ARM
LOCATION DETAILED: LEFT INFERIOR MEDIAL MALAR CHEEK
LOCATION DETAILED: RIGHT DORSAL FOOT

## 2024-05-07 ASSESSMENT — LOCATION ZONE DERM
LOCATION ZONE: ARM
LOCATION ZONE: FEET
LOCATION ZONE: TRUNK
LOCATION ZONE: FACE
LOCATION ZONE: LEG

## 2024-05-07 NOTE — PROCEDURE: ADDITIONAL NOTES
Detail Level: Simple
Render Risk Assessment In Note?: no
Additional Notes: Up to date on routine health maintenance exams\\nROS negative
Additional Notes: No change
Additional Notes: Previous: Patient states PCP did full work up and she has seen vascular specialists. No known underlying connective tissue disorders, underlying malignancy or known vasculitis. Patient states it has been present for many years without significant change.
3

## 2024-05-16 ENCOUNTER — OFFICE VISIT (OUTPATIENT)
Dept: URGENT CARE | Facility: CLINIC | Age: 49
End: 2024-05-16
Payer: COMMERCIAL

## 2024-05-16 VITALS
TEMPERATURE: 97.5 F | BODY MASS INDEX: 37.89 KG/M2 | SYSTOLIC BLOOD PRESSURE: 128 MMHG | OXYGEN SATURATION: 94 % | HEART RATE: 90 BPM | RESPIRATION RATE: 16 BRPM | DIASTOLIC BLOOD PRESSURE: 80 MMHG | WEIGHT: 250 LBS | HEIGHT: 68 IN

## 2024-05-16 DIAGNOSIS — J02.9 SORE THROAT: ICD-10-CM

## 2024-05-16 LAB — S PYO DNA SPEC NAA+PROBE: NOT DETECTED

## 2024-05-16 PROCEDURE — 87651 STREP A DNA AMP PROBE: CPT | Performed by: PHYSICIAN ASSISTANT

## 2024-05-16 PROCEDURE — 3079F DIAST BP 80-89 MM HG: CPT | Performed by: PHYSICIAN ASSISTANT

## 2024-05-16 PROCEDURE — 99213 OFFICE O/P EST LOW 20 MIN: CPT | Performed by: PHYSICIAN ASSISTANT

## 2024-05-16 PROCEDURE — 3074F SYST BP LT 130 MM HG: CPT | Performed by: PHYSICIAN ASSISTANT

## 2024-05-16 RX ORDER — LOSARTAN POTASSIUM AND HYDROCHLOROTHIAZIDE 25; 100 MG/1; MG/1
TABLET ORAL
COMMUNITY
Start: 2024-05-01

## 2024-05-16 RX ORDER — ATORVASTATIN CALCIUM 80 MG/1
TABLET, FILM COATED ORAL
COMMUNITY
Start: 2024-02-28

## 2024-05-16 RX ORDER — HYDROCODONE BITARTRATE AND ACETAMINOPHEN 5; 325 MG/1; MG/1
TABLET ORAL
COMMUNITY
Start: 2024-04-25

## 2024-05-16 RX ORDER — SCOLOPAMINE TRANSDERMAL SYSTEM 1 MG/1
PATCH, EXTENDED RELEASE TRANSDERMAL
COMMUNITY
Start: 2024-02-27

## 2024-05-16 RX ORDER — AZELASTINE HYDROCHLORIDE 137 UG/1
SPRAY, METERED NASAL
COMMUNITY
Start: 2024-05-03

## 2024-05-16 ASSESSMENT — ENCOUNTER SYMPTOMS
COUGH: 0
SORE THROAT: 1
CHILLS: 0
SINUS PAIN: 0
FEVER: 0

## 2024-05-16 ASSESSMENT — FIBROSIS 4 INDEX: FIB4 SCORE: 1.96

## 2024-05-16 NOTE — PROGRESS NOTES
Subjective:   Reta Lam is a 48 y.o. female who presents today with   Chief Complaint   Patient presents with    Sore Throat     Onset today     Pharyngitis   This is a new problem. The current episode started today. The problem has been unchanged. There has been no fever. Associated symptoms include congestion. Pertinent negatives include no coughing. She has tried NSAIDs for the symptoms. The treatment provided mild relief.     Patient states she had sinus surgery about 3 weeks ago and has had follow-up with the ENT since then and they did not clear out and she has been using NeilMed rinses and azelastine and Xyzal over-the-counter.  No change in the congestion or nasal drip.  This morning she woke up with sore throat.    PMH:  has a past medical history of GERD (gastroesophageal reflux disease), Heart burn, High cholesterol, Hypertension, and Migraine.    She has no past medical history of Bowel habit changes or Encounter for long-term (current) use of other medications.  MEDS:   Current Outpatient Medications:     Azelastine (ASTELIN) 137 MCG/SPRAY Solution, , Disp: , Rfl:     losartan-hydrochlorothiazide (HYZAAR) 100-25 MG per tablet, , Disp: , Rfl:     scopolamine (TRANSDERM-SCOP) 1 mg/72hr PATCH 72 HR, , Disp: , Rfl:     nortriptyline (PAMELOR) 10 MG Cap, Take 3 Capsules by mouth every day., Disp: 270 Capsule, Rfl: 1    escitalopram (LEXAPRO) 10 MG Tab, Take 1 Tablet by mouth every day., Disp: 30 Tablet, Rfl: 3    propranolol LA (INDERAL LA) 60 MG CAPSULE SR 24 HR, Take 1 Capsule by mouth every day., Disp: 90 Capsule, Rfl: 1    omeprazole (PRILOSEC) 20 MG delayed-release capsule, Take 1 Capsule by mouth every evening., Disp: 90 Capsule, Rfl: 3    atorvastatin (LIPITOR) 40 MG Tab, Take 1 Tablet by mouth at bedtime., Disp: 90 Tablet, Rfl: 3    albuterol 108 (90 Base) MCG/ACT Aero Soln inhalation aerosol, Inhale 2 Puffs every four hours as needed (chest tightness/discomfort)., Disp: 18 g, Rfl: 1     Levocetirizine Dihydrochloride 5 MG Tab, Take  by mouth., Disp: , Rfl:     Cyanocobalamin (VITAMIN B-12) 1000 MCG Tab, Take 1,000 mcg by mouth every day., Disp: , Rfl:     ibuprofen (MOTRIN) 200 MG Tab, Take 400 mg by mouth every 6 hours as needed for Mild Pain., Disp: , Rfl:     Multiple Vitamin (MULTI-VITAMIN PO), Take 1 Tab by mouth every bedtime., Disp: , Rfl:     Levonorgestrel (MIRENA, 52 MG, IU), 1 Each by Intrauterine route see administration instructions. Every 5 years  Indications: replaced 2021, Disp: , Rfl:     Cholecalciferol (VITAMIN D3) 5000 UNITS Cap, Take 5,000 Units by mouth every bedtime., Disp: , Rfl:     HYDROcodone-acetaminophen (NORCO) 5-325 MG Tab per tablet, , Disp: , Rfl:     atorvastatin (LIPITOR) 80 MG tablet, , Disp: , Rfl:     losartan (COZAAR) 50 MG Tab, Take 1 Tablet by mouth every day. (Patient not taking: Reported on 5/16/2024), Disp: 90 Tablet, Rfl: 3    EMGALITY 120 MG/ML Solution Auto-injector, First, inject the contents of two pens under the skin once as a loading dose. (Patient not taking: Reported on 9/19/2022), Disp: 3 mL, Rfl: 1  ALLERGIES:   Allergies   Allergen Reactions    Codeine Vomiting and Nausea     SURGHX:   Past Surgical History:   Procedure Laterality Date    SEPTOPLASTY Bilateral 6/17/2016    Procedure: SEPTOPLASTY;  Surgeon: Argelia Cheng M.D.;  Location: [facility];  Service:     TURBINOPLASTY Bilateral 6/17/2016    Procedure: TURBINOPLASTY;  Surgeon: Argelia Cheng M.D.;  Location: [facility];  Service:     ANTROSTOMY Bilateral 6/17/2016    Procedure: ANTROSTOMY - ENDOSCOPIC MAXILLARY;  Surgeon: Argelia Cheng M.D.;  Location: [facility];  Service:     PRIMARY C SECTION  11/30/2011    Performed by DANIA PERSAUD at [facility] DEL[facility]ERY    TONSILLECTOMY  1991     SOCHX:  reports that she has never smoked. She has never used smokeless tobacco. She reports current alcohol use. She reports that she does not  "use drugs.  FH: Reviewed with patient, not pertinent to this visit.     Review of Systems   Constitutional:  Negative for chills and fever.   HENT:  Positive for congestion and sore throat. Negative for sinus pain.    Respiratory:  Negative for cough.       Objective:   /80 (BP Location: Left arm, Patient Position: Sitting, BP Cuff Size: Large adult)   Pulse 90   Temp 36.4 °C (97.5 °F) (Temporal)   Resp 16   Ht 1.727 m (5' 8\")   Wt 113 kg (250 lb)   SpO2 94%   BMI 38.01 kg/m²   Physical Exam  Vitals and nursing note reviewed.   Constitutional:       General: She is not in acute distress.     Appearance: Normal appearance. She is well-developed. She is not ill-appearing or toxic-appearing.   HENT:      Head: Normocephalic and atraumatic.      Right Ear: Hearing normal.      Left Ear: Hearing normal.      Nose: Congestion present.      Mouth/Throat:      Mouth: Mucous membranes are moist.      Pharynx: Uvula midline. Posterior oropharyngeal erythema present. No oropharyngeal exudate or uvula swelling.      Tonsils: No tonsillar exudate or tonsillar abscesses.      Comments: Postnasal drainage  Cardiovascular:      Rate and Rhythm: Normal rate and regular rhythm.      Heart sounds: Normal heart sounds.   Pulmonary:      Effort: Pulmonary effort is normal. No respiratory distress.      Breath sounds: Normal breath sounds. No stridor. No wheezing, rhonchi or rales.   Musculoskeletal:      Comments: Normal movement in all 4 extremities   Skin:     General: Skin is warm and dry.   Neurological:      Mental Status: She is alert.      Coordination: Coordination normal.   Psychiatric:         Mood and Affect: Mood normal.       STREP A -    Assessment/Plan:   Assessment    1. Sore throat  - POCT GROUP A STREP, PCR    Other orders  - Azelastine (ASTELIN) 137 MCG/SPRAY Solution  - HYDROcodone-acetaminophen (NORCO) 5-325 MG Tab per tablet;  (Patient not taking: Reported on 5/16/2024)  - losartan-hydrochlorothiazide " (HYZAAR) 100-25 MG per tablet  - scopolamine (TRANSDERM-SCOP) 1 mg/72hr PATCH 72 HR  - atorvastatin (LIPITOR) 80 MG tablet;  (Patient not taking: Reported on 5/16/2024)    Symptoms and presentation appear consistent with postnasal drainage causing her sore throat.  Would recommend following up with her ENT visit who she sees next week.  No indication for antibiotics at this time as the congestion and sinus drainage has not changed since she had her surgery.  Would recommend continue with over-the-counter symptomatic treatment per 's instructions.    Differential diagnosis, natural history, supportive care, and indications for immediate follow-up discussed.   Patient given instructions and understanding of medications and treatment.    If not improving in 3-5 days, F/U with PCP or return to UC if symptoms worsen.    Patient agreeable to plan.      Please note that this dictation was created using voice recognition software. I have made every reasonable attempt to correct obvious errors, but I expect that there are errors of grammar and possibly content that I did not discover before finalizing the note.    Kelton Ta PA-C

## 2024-07-08 NOTE — PROCEDURE: BIOPSY BY SHAVE METHOD
Patient is awake, alert, oriented x4. He is pleasant, quiet but visible on the unit. He was sitting in group before coming into treatment team. He thought process is linear and content is logical. He reports anxiety is 3/10 and depression is 4/10 but he states he is anxious to go home. He reports he feels ready to go home and that the medications have helped him \"concentrate and relax.\" He reports his sleep and appetite are good. He denies SI/HI/AVH. He reports he follows with Max, lives with mom, and he reports he plans on being med compliant at home. He reports mom is a positive support for him. Encouraged patient to attend group pending discharge.   
Detail Level: Detailed
Depth Of Biopsy: dermis
Was A Bandage Applied: Yes
Size Of Lesion In Cm: 0
Biopsy Type: H and E
Biopsy Method: Personna blade
Anesthesia Type: 1% lidocaine without epinephrine
Anesthesia Volume In Cc: 1.5
Hemostasis: Pressure
Wound Care: Petrolatum
Dressing: bandage
Destruction After The Procedure: No
Type Of Destruction Used: Curettage
Curettage Text: The wound bed was treated with curettage after the biopsy was performed.
Cryotherapy Text: The wound bed was treated with cryotherapy after the biopsy was performed.
Electrodesiccation Text: The wound bed was treated with electrodesiccation after the biopsy was performed.
Electrodesiccation And Curettage Text: The wound bed was treated with electrodesiccation and curettage after the biopsy was performed.
Silver Nitrate Text: The wound bed was treated with silver nitrate after the biopsy was performed.
Lab: [id number]
Lab Facility: 
Consent: Verbal consent was obtained and risks were reviewed including but not limited to scarring, infection, bleeding, scabbing, incomplete removal, nerve damage and allergy to anesthesia.
Post-Care Instructions: I reviewed with the patient in detail post-care instructions. Patient is to keep the biopsy site dry overnight, and then apply bacitracin twice daily until healed. Patient may apply hydrogen peroxide soaks to remove any crusting.
Notification Instructions: Patient will be notified of biopsy results. However, patient instructed to call the office if not contacted within 2 weeks.
Billing Type: Third-Party Bill
Information: Selecting Yes will display possible errors in your note based on the variables you have selected. This validation is only offered as a suggestion for you. PLEASE NOTE THAT THE VALIDATION TEXT WILL BE REMOVED WHEN YOU FINALIZE YOUR NOTE. IF YOU WANT TO FAX A PRELIMINARY NOTE YOU WILL NEED TO TOGGLE THIS TO 'NO' IF YOU DO NOT WANT IT IN YOUR FAXED NOTE.

## 2024-09-13 ENCOUNTER — HOSPITAL ENCOUNTER (OUTPATIENT)
Facility: MEDICAL CENTER | Age: 49
End: 2024-09-13
Attending: OTOLARYNGOLOGY
Payer: COMMERCIAL

## 2024-09-13 LAB
GRAM STN SPEC: NORMAL
SIGNIFICANT IND 70042: NORMAL
SITE SITE: NORMAL
SOURCE SOURCE: NORMAL

## 2024-09-13 PROCEDURE — 87205 SMEAR GRAM STAIN: CPT

## 2024-09-13 PROCEDURE — 87186 SC STD MICRODIL/AGAR DIL: CPT

## 2024-09-13 PROCEDURE — 87070 CULTURE OTHR SPECIMN AEROBIC: CPT

## 2024-09-13 PROCEDURE — 87077 CULTURE AEROBIC IDENTIFY: CPT

## 2024-09-13 PROCEDURE — 87075 CULTR BACTERIA EXCEPT BLOOD: CPT

## 2024-09-16 LAB
BACTERIA SPEC ANAEROBE CULT: NORMAL
BACTERIA WND AEROBE CULT: ABNORMAL
BACTERIA WND AEROBE CULT: ABNORMAL
GRAM STN SPEC: ABNORMAL
SIGNIFICANT IND 70042: ABNORMAL
SIGNIFICANT IND 70042: NORMAL
SITE SITE: ABNORMAL
SITE SITE: NORMAL
SOURCE SOURCE: ABNORMAL
SOURCE SOURCE: NORMAL

## 2024-11-06 ENCOUNTER — APPOINTMENT (RX ONLY)
Dept: URBAN - METROPOLITAN AREA CLINIC 6 | Facility: CLINIC | Age: 49
Setting detail: DERMATOLOGY
End: 2024-11-06

## 2024-11-06 DIAGNOSIS — Z85.820 PERSONAL HISTORY OF MALIGNANT MELANOMA OF SKIN: ICD-10-CM

## 2024-11-06 DIAGNOSIS — R23.1 PALLOR: ICD-10-CM

## 2024-11-06 DIAGNOSIS — D22 MELANOCYTIC NEVI: ICD-10-CM

## 2024-11-06 DIAGNOSIS — D18.0 HEMANGIOMA: ICD-10-CM

## 2024-11-06 DIAGNOSIS — L91.8 OTHER HYPERTROPHIC DISORDERS OF THE SKIN: ICD-10-CM

## 2024-11-06 DIAGNOSIS — Z71.89 OTHER SPECIFIED COUNSELING: ICD-10-CM

## 2024-11-06 DIAGNOSIS — L82.1 OTHER SEBORRHEIC KERATOSIS: ICD-10-CM

## 2024-11-06 DIAGNOSIS — L57.0 ACTINIC KERATOSIS: ICD-10-CM

## 2024-11-06 DIAGNOSIS — L81.4 OTHER MELANIN HYPERPIGMENTATION: ICD-10-CM

## 2024-11-06 PROBLEM — D22.62 MELANOCYTIC NEVI OF LEFT UPPER LIMB, INCLUDING SHOULDER: Status: ACTIVE | Noted: 2024-11-06

## 2024-11-06 PROBLEM — D22.61 MELANOCYTIC NEVI OF RIGHT UPPER LIMB, INCLUDING SHOULDER: Status: ACTIVE | Noted: 2024-11-06

## 2024-11-06 PROBLEM — D23.62 OTHER BENIGN NEOPLASM OF SKIN OF LEFT UPPER LIMB, INCLUDING SHOULDER: Status: ACTIVE | Noted: 2024-11-06

## 2024-11-06 PROBLEM — D22.71 MELANOCYTIC NEVI OF RIGHT LOWER LIMB, INCLUDING HIP: Status: ACTIVE | Noted: 2024-11-06

## 2024-11-06 PROBLEM — D22.39 MELANOCYTIC NEVI OF OTHER PARTS OF FACE: Status: ACTIVE | Noted: 2024-11-06

## 2024-11-06 PROBLEM — D22.5 MELANOCYTIC NEVI OF TRUNK: Status: ACTIVE | Noted: 2024-11-06

## 2024-11-06 PROBLEM — D22.72 MELANOCYTIC NEVI OF LEFT LOWER LIMB, INCLUDING HIP: Status: ACTIVE | Noted: 2024-11-06

## 2024-11-06 PROBLEM — D18.01 HEMANGIOMA OF SKIN AND SUBCUTANEOUS TISSUE: Status: ACTIVE | Noted: 2024-11-06

## 2024-11-06 PROCEDURE — 17003 DESTRUCT PREMALG LES 2-14: CPT

## 2024-11-06 PROCEDURE — ? ADDITIONAL NOTES

## 2024-11-06 PROCEDURE — ? LIQUID NITROGEN

## 2024-11-06 PROCEDURE — 99213 OFFICE O/P EST LOW 20 MIN: CPT | Mod: 25

## 2024-11-06 PROCEDURE — ? BENIGN DESTRUCTION COSMETIC

## 2024-11-06 PROCEDURE — ? DIAGNOSIS COMMENT

## 2024-11-06 PROCEDURE — ? SUNSCREEN TREATMENT REGIMEN

## 2024-11-06 PROCEDURE — ? COUNSELING

## 2024-11-06 PROCEDURE — 17000 DESTRUCT PREMALG LESION: CPT

## 2024-11-06 ASSESSMENT — LOCATION DETAILED DESCRIPTION DERM
LOCATION DETAILED: RIGHT INFERIOR CENTRAL MALAR CHEEK
LOCATION DETAILED: LOWER STERNUM
LOCATION DETAILED: LEFT DISTAL PRETIBIAL REGION
LOCATION DETAILED: RIGHT MEDIAL UPPER BACK
LOCATION DETAILED: LEFT MEDIAL ZYGOMA
LOCATION DETAILED: LEFT INFERIOR MEDIAL MALAR CHEEK
LOCATION DETAILED: RIGHT PROXIMAL POSTERIOR UPPER ARM
LOCATION DETAILED: LEFT DISTAL POSTERIOR UPPER ARM
LOCATION DETAILED: RIGHT PROXIMAL DORSAL FOREARM
LOCATION DETAILED: RIGHT ANTERIOR PROXIMAL THIGH
LOCATION DETAILED: LEFT SUPERIOR MEDIAL MALAR CHEEK
LOCATION DETAILED: LEFT LATERAL SUPERIOR CHEST
LOCATION DETAILED: RIGHT CLAVICULAR NECK
LOCATION DETAILED: LEFT PROXIMAL PRETIBIAL REGION
LOCATION DETAILED: LEFT PROXIMAL DORSAL FOREARM
LOCATION DETAILED: RIGHT PROXIMAL PRETIBIAL REGION
LOCATION DETAILED: RIGHT DISTAL POSTERIOR UPPER ARM
LOCATION DETAILED: RIGHT DORSAL FOOT
LOCATION DETAILED: LEFT ANTERIOR PROXIMAL THIGH
LOCATION DETAILED: LEFT MID-UPPER BACK
LOCATION DETAILED: LEFT PROXIMAL POSTERIOR UPPER ARM
LOCATION DETAILED: RIGHT DISTAL PRETIBIAL REGION
LOCATION DETAILED: LEFT DORSAL FOOT
LOCATION DETAILED: SUPERIOR THORACIC SPINE

## 2024-11-06 ASSESSMENT — LOCATION SIMPLE DESCRIPTION DERM
LOCATION SIMPLE: RIGHT CHEEK
LOCATION SIMPLE: RIGHT THIGH
LOCATION SIMPLE: UPPER BACK
LOCATION SIMPLE: RIGHT FOOT
LOCATION SIMPLE: LEFT FOOT
LOCATION SIMPLE: LEFT FOREARM
LOCATION SIMPLE: RIGHT POSTERIOR UPPER ARM
LOCATION SIMPLE: RIGHT PRETIBIAL REGION
LOCATION SIMPLE: LEFT PRETIBIAL REGION
LOCATION SIMPLE: RIGHT ANTERIOR NECK
LOCATION SIMPLE: LEFT THIGH
LOCATION SIMPLE: LEFT POSTERIOR UPPER ARM
LOCATION SIMPLE: RIGHT FOREARM
LOCATION SIMPLE: CHEST
LOCATION SIMPLE: RIGHT UPPER BACK
LOCATION SIMPLE: LEFT CHEEK
LOCATION SIMPLE: LEFT UPPER BACK
LOCATION SIMPLE: LEFT ZYGOMA

## 2024-11-06 ASSESSMENT — LOCATION ZONE DERM
LOCATION ZONE: TRUNK
LOCATION ZONE: NECK
LOCATION ZONE: FACE
LOCATION ZONE: ARM
LOCATION ZONE: LEG
LOCATION ZONE: FEET

## 2024-11-06 NOTE — PROCEDURE: ADDITIONAL NOTES
Detail Level: Simple
Render Risk Assessment In Note?: no
Additional Notes: Up to date on routine health maintenance exams\\nROS negative
Additional Notes: No change
Additional Notes: Previous: Patient states PCP did full work up and she has seen vascular specialists. No known underlying connective tissue disorders, underlying malignancy or known vasculitis. Patient states it has been present for many years without significant change.

## 2025-01-15 ENCOUNTER — HOSPITAL ENCOUNTER (OUTPATIENT)
Dept: RADIOLOGY | Facility: MEDICAL CENTER | Age: 50
End: 2025-01-15
Attending: NURSE PRACTITIONER
Payer: COMMERCIAL

## 2025-01-15 DIAGNOSIS — G47.34 NOCTURNAL HYPOXIA: ICD-10-CM

## 2025-01-15 DIAGNOSIS — R05.3 CHRONIC COUGH: ICD-10-CM

## 2025-01-15 PROCEDURE — 71046 X-RAY EXAM CHEST 2 VIEWS: CPT

## 2025-01-17 ENCOUNTER — HOSPITAL ENCOUNTER (OUTPATIENT)
Dept: LAB | Facility: MEDICAL CENTER | Age: 50
End: 2025-01-17
Attending: OTOLARYNGOLOGY
Payer: COMMERCIAL

## 2025-01-17 ENCOUNTER — HOSPITAL ENCOUNTER (OUTPATIENT)
Dept: LAB | Facility: MEDICAL CENTER | Age: 50
End: 2025-01-17
Attending: NURSE PRACTITIONER
Payer: COMMERCIAL

## 2025-01-17 LAB
ALBUMIN SERPL BCP-MCNC: 4.2 G/DL (ref 3.2–4.9)
ALBUMIN/GLOB SERPL: 1.1 G/DL
ALP SERPL-CCNC: 83 U/L (ref 30–99)
ALT SERPL-CCNC: 38 U/L (ref 2–50)
ANION GAP SERPL CALC-SCNC: 15 MMOL/L (ref 7–16)
AST SERPL-CCNC: 25 U/L (ref 12–45)
BASOPHILS # BLD AUTO: 1.1 % (ref 0–1.8)
BASOPHILS # BLD: 0.11 K/UL (ref 0–0.12)
BILIRUB SERPL-MCNC: 0.6 MG/DL (ref 0.1–1.5)
BUN SERPL-MCNC: 13 MG/DL (ref 8–22)
CALCIUM ALBUM COR SERPL-MCNC: 9.4 MG/DL (ref 8.5–10.5)
CALCIUM SERPL-MCNC: 9.6 MG/DL (ref 8.5–10.5)
CHLORIDE SERPL-SCNC: 98 MMOL/L (ref 96–112)
CHOLEST SERPL-MCNC: 140 MG/DL (ref 100–199)
CO2 SERPL-SCNC: 23 MMOL/L (ref 20–33)
CREAT SERPL-MCNC: 0.95 MG/DL (ref 0.5–1.4)
EOSINOPHIL # BLD AUTO: 0.29 K/UL (ref 0–0.51)
EOSINOPHIL NFR BLD: 2.9 % (ref 0–6.9)
ERYTHROCYTE [DISTWIDTH] IN BLOOD BY AUTOMATED COUNT: 46.8 FL (ref 35.9–50)
EST. AVERAGE GLUCOSE BLD GHB EST-MCNC: 126 MG/DL
GFR SERPLBLD CREATININE-BSD FMLA CKD-EPI: 73 ML/MIN/1.73 M 2
GLOBULIN SER CALC-MCNC: 3.8 G/DL (ref 1.9–3.5)
GLUCOSE SERPL-MCNC: 100 MG/DL (ref 65–99)
HBA1C MFR BLD: 6 % (ref 4–5.6)
HCT VFR BLD AUTO: 50.8 % (ref 37–47)
HDLC SERPL-MCNC: 36 MG/DL
HGB BLD-MCNC: 16.4 G/DL (ref 12–16)
IMM GRANULOCYTES # BLD AUTO: 0.04 K/UL (ref 0–0.11)
IMM GRANULOCYTES NFR BLD AUTO: 0.4 % (ref 0–0.9)
LDLC SERPL CALC-MCNC: 82 MG/DL
LYMPHOCYTES # BLD AUTO: 1.7 K/UL (ref 1–4.8)
LYMPHOCYTES NFR BLD: 17.3 % (ref 22–41)
MCH RBC QN AUTO: 30.3 PG (ref 27–33)
MCHC RBC AUTO-ENTMCNC: 32.3 G/DL (ref 32.2–35.5)
MCV RBC AUTO: 93.7 FL (ref 81.4–97.8)
MONOCYTES # BLD AUTO: 0.96 K/UL (ref 0–0.85)
MONOCYTES NFR BLD AUTO: 9.7 % (ref 0–13.4)
NEUTROPHILS # BLD AUTO: 6.75 K/UL (ref 1.82–7.42)
NEUTROPHILS NFR BLD: 68.6 % (ref 44–72)
NRBC # BLD AUTO: 0 K/UL
NRBC BLD-RTO: 0 /100 WBC (ref 0–0.2)
PLATELET # BLD AUTO: 284 K/UL (ref 164–446)
PMV BLD AUTO: 10.3 FL (ref 9–12.9)
POTASSIUM SERPL-SCNC: 4.6 MMOL/L (ref 3.6–5.5)
PROT SERPL-MCNC: 8 G/DL (ref 6–8.2)
RBC # BLD AUTO: 5.42 M/UL (ref 4.2–5.4)
SODIUM SERPL-SCNC: 136 MMOL/L (ref 135–145)
TRIGL SERPL-MCNC: 109 MG/DL (ref 0–149)
TSH SERPL DL<=0.005 MIU/L-ACNC: 1.66 UIU/ML (ref 0.38–5.33)
WBC # BLD AUTO: 9.9 K/UL (ref 4.8–10.8)

## 2025-01-17 PROCEDURE — 80053 COMPREHEN METABOLIC PANEL: CPT

## 2025-01-17 PROCEDURE — 86038 ANTINUCLEAR ANTIBODIES: CPT

## 2025-01-17 PROCEDURE — 82784 ASSAY IGA/IGD/IGG/IGM EACH: CPT

## 2025-01-17 PROCEDURE — 86317 IMMUNOASSAY INFECTIOUS AGENT: CPT | Mod: 91

## 2025-01-17 PROCEDURE — 36415 COLL VENOUS BLD VENIPUNCTURE: CPT

## 2025-01-17 PROCEDURE — 84443 ASSAY THYROID STIM HORMONE: CPT

## 2025-01-17 PROCEDURE — 82787 IGG 1 2 3 OR 4 EACH: CPT

## 2025-01-17 PROCEDURE — 83036 HEMOGLOBIN GLYCOSYLATED A1C: CPT

## 2025-01-17 PROCEDURE — 85025 COMPLETE CBC W/AUTO DIFF WBC: CPT

## 2025-01-17 PROCEDURE — 80061 LIPID PANEL: CPT

## 2025-01-20 LAB
C DIPHTHERIAE IGG SER-ACNC: 0.4 IU/ML
C TETANI TOXOID IGG SERPL IA-ACNC: 1.4 IU/ML
HAEM INFLU B IGG SER-MCNC: 0.1 UG/ML
IGA SERPL-MCNC: 115 MG/DL (ref 68–408)
IGG SERPL-MCNC: 1619 MG/DL (ref 768–1632)
IGG1 SER-MCNC: 587 MG/DL (ref 240–1118)
IGG2 SER-MCNC: 971 MG/DL (ref 124–549)
IGG3 SER-MCNC: 69 MG/DL (ref 21–134)
IGG4 SER-MCNC: 4 MG/DL (ref 1–123)
IGM SERPL-MCNC: 290 MG/DL (ref 35–263)
NUCLEAR IGG SER QL IA: NORMAL

## 2025-01-21 LAB
S PN DA SERO 19F IGG SER-MCNC: 4.97 UG/ML
S PNEUM DA 1 IGG SER-MCNC: 3.5 UG/ML
S PNEUM DA 12F IGG SER-MCNC: 0.51 UG/ML
S PNEUM DA 14 IGG SER-MCNC: 3.09 UG/ML
S PNEUM DA 18C IGG SER-MCNC: 0.48 UG/ML
S PNEUM DA 23F IGG SER-MCNC: 0.72 UG/ML
S PNEUM DA 3 IGG SER-MCNC: 0.88 UG/ML
S PNEUM DA 4 IGG SER-MCNC: 1.41 UG/ML
S PNEUM DA 5 IGG SER-MCNC: 1.86 UG/ML
S PNEUM DA 6B IGG SER-MCNC: 0.52 UG/ML
S PNEUM DA 7F IGG SER-MCNC: 0.12 UG/ML
S PNEUM DA 8 IGG SER-MCNC: 0.65 UG/ML
S PNEUM DA 9N IGG SER-MCNC: 0.3 UG/ML
S PNEUM DA 9V IGG SER-MCNC: 0.45 UG/ML
S PNEUM SEROTYPE IGG SER-IMP: NORMAL

## 2025-01-29 ENCOUNTER — HOSPITAL ENCOUNTER (OUTPATIENT)
Dept: RADIOLOGY | Facility: MEDICAL CENTER | Age: 50
End: 2025-01-29
Attending: NURSE PRACTITIONER
Payer: COMMERCIAL

## 2025-01-29 DIAGNOSIS — R05.3 PERSISTENT COUGH: ICD-10-CM

## 2025-01-29 DIAGNOSIS — Z85.820 HISTORY OF MELANOMA: ICD-10-CM

## 2025-01-29 PROCEDURE — 71250 CT THORAX DX C-: CPT

## 2025-02-02 SDOH — ECONOMIC STABILITY: FOOD INSECURITY: WITHIN THE PAST 12 MONTHS, THE FOOD YOU BOUGHT JUST DIDN'T LAST AND YOU DIDN'T HAVE MONEY TO GET MORE.: NEVER TRUE

## 2025-02-02 SDOH — ECONOMIC STABILITY: FOOD INSECURITY: WITHIN THE PAST 12 MONTHS, YOU WORRIED THAT YOUR FOOD WOULD RUN OUT BEFORE YOU GOT MONEY TO BUY MORE.: NEVER TRUE

## 2025-02-02 SDOH — HEALTH STABILITY: MENTAL HEALTH
STRESS IS WHEN SOMEONE FEELS TENSE, NERVOUS, ANXIOUS, OR CAN'T SLEEP AT NIGHT BECAUSE THEIR MIND IS TROUBLED. HOW STRESSED ARE YOU?: NOT AT ALL

## 2025-02-02 SDOH — ECONOMIC STABILITY: TRANSPORTATION INSECURITY
IN THE PAST 12 MONTHS, HAS LACK OF RELIABLE TRANSPORTATION KEPT YOU FROM MEDICAL APPOINTMENTS, MEETINGS, WORK OR FROM GETTING THINGS NEEDED FOR DAILY LIVING?: NO

## 2025-02-02 SDOH — ECONOMIC STABILITY: INCOME INSECURITY: HOW HARD IS IT FOR YOU TO PAY FOR THE VERY BASICS LIKE FOOD, HOUSING, MEDICAL CARE, AND HEATING?: NOT HARD AT ALL

## 2025-02-02 SDOH — ECONOMIC STABILITY: INCOME INSECURITY: IN THE LAST 12 MONTHS, WAS THERE A TIME WHEN YOU WERE NOT ABLE TO PAY THE MORTGAGE OR RENT ON TIME?: NO

## 2025-02-02 SDOH — HEALTH STABILITY: PHYSICAL HEALTH: ON AVERAGE, HOW MANY MINUTES DO YOU ENGAGE IN EXERCISE AT THIS LEVEL?: 0 MIN

## 2025-02-02 SDOH — ECONOMIC STABILITY: TRANSPORTATION INSECURITY
IN THE PAST 12 MONTHS, HAS LACK OF TRANSPORTATION KEPT YOU FROM MEETINGS, WORK, OR FROM GETTING THINGS NEEDED FOR DAILY LIVING?: NO

## 2025-02-02 SDOH — HEALTH STABILITY: PHYSICAL HEALTH: ON AVERAGE, HOW MANY DAYS PER WEEK DO YOU ENGAGE IN MODERATE TO STRENUOUS EXERCISE (LIKE A BRISK WALK)?: 0 DAYS

## 2025-02-02 SDOH — ECONOMIC STABILITY: TRANSPORTATION INSECURITY
IN THE PAST 12 MONTHS, HAS THE LACK OF TRANSPORTATION KEPT YOU FROM MEDICAL APPOINTMENTS OR FROM GETTING MEDICATIONS?: NO

## 2025-02-02 SDOH — ECONOMIC STABILITY: HOUSING INSECURITY
IN THE LAST 12 MONTHS, WAS THERE A TIME WHEN YOU DID NOT HAVE A STEADY PLACE TO SLEEP OR SLEPT IN A SHELTER (INCLUDING NOW)?: NO

## 2025-02-02 ASSESSMENT — SOCIAL DETERMINANTS OF HEALTH (SDOH)
IN A TYPICAL WEEK, HOW MANY TIMES DO YOU TALK ON THE PHONE WITH FAMILY, FRIENDS, OR NEIGHBORS?: MORE THAN THREE TIMES A WEEK
HOW OFTEN DO YOU ATTEND CHURCH OR RELIGIOUS SERVICES?: NEVER
HOW OFTEN DO YOU HAVE A DRINK CONTAINING ALCOHOL: MONTHLY OR LESS
HOW OFTEN DO YOU ATTENT MEETINGS OF THE CLUB OR ORGANIZATION YOU BELONG TO?: MORE THAN 4 TIMES PER YEAR
HOW OFTEN DO YOU ATTEND CHURCH OR RELIGIOUS SERVICES?: NEVER
DO YOU BELONG TO ANY CLUBS OR ORGANIZATIONS SUCH AS CHURCH GROUPS UNIONS, FRATERNAL OR ATHLETIC GROUPS, OR SCHOOL GROUPS?: YES
HOW MANY DRINKS CONTAINING ALCOHOL DO YOU HAVE ON A TYPICAL DAY WHEN YOU ARE DRINKING: 1 OR 2
HOW OFTEN DO YOU ATTENT MEETINGS OF THE CLUB OR ORGANIZATION YOU BELONG TO?: MORE THAN 4 TIMES PER YEAR
IN A TYPICAL WEEK, HOW MANY TIMES DO YOU TALK ON THE PHONE WITH FAMILY, FRIENDS, OR NEIGHBORS?: MORE THAN THREE TIMES A WEEK
IN THE PAST 12 MONTHS, HAS THE ELECTRIC, GAS, OIL, OR WATER COMPANY THREATENED TO SHUT OFF SERVICE IN YOUR HOME?: NO
HOW OFTEN DO YOU GET TOGETHER WITH FRIENDS OR RELATIVES?: TWICE A WEEK
WITHIN THE PAST 12 MONTHS, YOU WORRIED THAT YOUR FOOD WOULD RUN OUT BEFORE YOU GOT THE MONEY TO BUY MORE: NEVER TRUE
DO YOU BELONG TO ANY CLUBS OR ORGANIZATIONS SUCH AS CHURCH GROUPS UNIONS, FRATERNAL OR ATHLETIC GROUPS, OR SCHOOL GROUPS?: YES
HOW OFTEN DO YOU GET TOGETHER WITH FRIENDS OR RELATIVES?: TWICE A WEEK
HOW HARD IS IT FOR YOU TO PAY FOR THE VERY BASICS LIKE FOOD, HOUSING, MEDICAL CARE, AND HEATING?: NOT HARD AT ALL
HOW OFTEN DO YOU HAVE SIX OR MORE DRINKS ON ONE OCCASION: NEVER

## 2025-02-02 ASSESSMENT — LIFESTYLE VARIABLES
SKIP TO QUESTIONS 9-10: 1
HOW OFTEN DO YOU HAVE A DRINK CONTAINING ALCOHOL: MONTHLY OR LESS
HOW OFTEN DO YOU HAVE SIX OR MORE DRINKS ON ONE OCCASION: NEVER
HOW MANY STANDARD DRINKS CONTAINING ALCOHOL DO YOU HAVE ON A TYPICAL DAY: 1 OR 2
AUDIT-C TOTAL SCORE: 1

## 2025-02-03 ENCOUNTER — APPOINTMENT (OUTPATIENT)
Dept: INTERNAL MEDICINE | Facility: IMAGING CENTER | Age: 50
End: 2025-02-03
Payer: COMMERCIAL

## 2025-02-03 VITALS
SYSTOLIC BLOOD PRESSURE: 134 MMHG | BODY MASS INDEX: 36.68 KG/M2 | WEIGHT: 242 LBS | HEIGHT: 68 IN | OXYGEN SATURATION: 92 % | TEMPERATURE: 97.9 F | HEART RATE: 101 BPM | DIASTOLIC BLOOD PRESSURE: 78 MMHG

## 2025-02-03 DIAGNOSIS — J43.1 PANLOBULAR EMPHYSEMA (HCC): ICD-10-CM

## 2025-02-03 DIAGNOSIS — G44.86 CERVICOGENIC HEADACHE: ICD-10-CM

## 2025-02-03 DIAGNOSIS — E66.9 OBESITY (BMI 30-39.9): ICD-10-CM

## 2025-02-03 DIAGNOSIS — R06.09 DYSPNEA ON EXERTION: ICD-10-CM

## 2025-02-03 DIAGNOSIS — G47.33 OBSTRUCTIVE SLEEP APNEA OF ADULT: ICD-10-CM

## 2025-02-03 DIAGNOSIS — R73.9 HYPERGLYCEMIA: ICD-10-CM

## 2025-02-03 DIAGNOSIS — Z12.31 ENCOUNTER FOR SCREENING MAMMOGRAM FOR BREAST CANCER: ICD-10-CM

## 2025-02-03 DIAGNOSIS — G43.909 MIGRAINE SYNDROME: ICD-10-CM

## 2025-02-03 DIAGNOSIS — I10 PRIMARY HYPERTENSION: ICD-10-CM

## 2025-02-03 DIAGNOSIS — Z12.4 CERVICAL CANCER SCREENING: ICD-10-CM

## 2025-02-03 DIAGNOSIS — J01.41 ACUTE RECURRENT PANSINUSITIS: ICD-10-CM

## 2025-02-03 DIAGNOSIS — Z00.00 HEALTH CARE MAINTENANCE: ICD-10-CM

## 2025-02-03 PROBLEM — C43.59 MALIGNANT MELANOMA OF BACK (HCC): Status: RESOLVED | Noted: 2022-06-01 | Resolved: 2025-02-03

## 2025-02-03 PROBLEM — Z85.820 HISTORY OF MELANOMA EXCISION: Status: ACTIVE | Noted: 2025-02-03

## 2025-02-03 PROBLEM — Z98.890 HISTORY OF MELANOMA EXCISION: Status: ACTIVE | Noted: 2025-02-03

## 2025-02-03 PROBLEM — R06.83 SNORING: Status: ACTIVE | Noted: 2024-12-03

## 2025-02-03 PROBLEM — G47.10 DAYTIME HYPERSOMNIA: Status: ACTIVE | Noted: 2024-12-03

## 2025-02-03 PROBLEM — G47.10 DAYTIME HYPERSOMNIA: Status: RESOLVED | Noted: 2024-12-03 | Resolved: 2025-02-03

## 2025-02-03 PROCEDURE — 3075F SYST BP GE 130 - 139MM HG: CPT | Performed by: FAMILY MEDICINE

## 2025-02-03 PROCEDURE — 99215 OFFICE O/P EST HI 40 MIN: CPT | Performed by: FAMILY MEDICINE

## 2025-02-03 PROCEDURE — 3078F DIAST BP <80 MM HG: CPT | Performed by: FAMILY MEDICINE

## 2025-02-03 RX ORDER — AZITHROMYCIN 500 MG/1
500 TABLET, FILM COATED ORAL DAILY
Qty: 7 TABLET | Refills: 0 | Status: SHIPPED | OUTPATIENT
Start: 2025-02-03 | End: 2025-02-10

## 2025-02-03 RX ORDER — METHYLPREDNISOLONE 4 MG/1
TABLET ORAL
Qty: 21 TABLET | Refills: 0 | Status: SHIPPED
Start: 2025-02-03 | End: 2025-02-19

## 2025-02-03 RX ORDER — FLUTICASONE FUROATE AND VILANTEROL 100; 25 UG/1; UG/1
1 POWDER RESPIRATORY (INHALATION) DAILY
Qty: 60 EACH | Refills: 3 | Status: SHIPPED | OUTPATIENT
Start: 2025-02-03

## 2025-02-03 RX ORDER — AMLODIPINE BESYLATE 5 MG/1
5 TABLET ORAL DAILY
COMMUNITY

## 2025-02-03 ASSESSMENT — FIBROSIS 4 INDEX: FIB4 SCORE: 0.7

## 2025-02-03 ASSESSMENT — PATIENT HEALTH QUESTIONNAIRE - PHQ9: CLINICAL INTERPRETATION OF PHQ2 SCORE: 0

## 2025-02-04 NOTE — PROGRESS NOTES
Chief Complaint   Patient presents with    Establish Care       HPI:  Patient is a 49 y.o. female who is here today to re establish care at my office. I have taken care of her from 9/13/19 - 5/24/23, and she has been seeing Nanette CHURCH most recently for primary care management. Her mother Brittney, also a patient of mine, is present at the visit today. Reta present today with multiple acute and chronic health conditions that need further evaluation. She has not felt well since Thanksgiving 2024 when she was diagnosed/treated for an acute sinus infection. She reports exertional dyspnea, variable oxygen levels with home monitoring (although she has gel on nails), recurrent sinus congestion, fatigue, chronic dry cough, and escalation of recurrent headache syndrome (different from chronic migraines per report). She also feels light headed at time, foggy, and not at her baseline. She denies associated N/V/D/F, denies new habits, and does not have recent history of travel. She is a non smoker but has extensive second hand smoke exposure history.     Patient Active Problem List    Diagnosis Date Noted    History of melanoma excision 02/03/2025    Dyspnea on exertion 01/14/2025    Obstructive sleep apnea of adult 12/03/2024    Snoring 12/03/2024    GENEVIEVE (generalized anxiety disorder) 05/24/2023    History of COVID-19 12/01/2020    Dry mouth 09/29/2020    Obesity (BMI 30-39.9) 09/13/2019    Mixed dyslipidemia 09/13/2019    SVT (supraventricular tachycardia) (HCC) 09/08/2019    Hypertension 09/08/2019    GERD (gastroesophageal reflux disease) 09/08/2019    Hyperglycemia 09/08/2019    Cervicogenic headache 12/12/2018    Migraine syndrome 03/28/2018       Past medical, surgical, family, and social history was reviewed and updated in Epic chart by me today.     Medications and allergies reviewed and updated in Epic chart by me today.     Extensive prior records also reviewed by myself in preparation for our visit today.  "    ROS:  Pertinent positives listed above in HPI. All other systems have been reviewed and are negative.    PE:   /78 (BP Location: Left arm, Patient Position: Sitting, BP Cuff Size: Adult)   Pulse (!) 101   Temp 36.6 °C (97.9 °F) (Temporal)   Ht 1.727 m (5' 8\")   Wt 110 kg (242 lb)   SpO2 92%   BMI 36.80 kg/m²   Vital signs reviewed with patient.     Gen: Well developed; well nourished; no acute distress; age appropriate/ non toxic appearance   HEENT: Normocephalic; atraumatic; PEERLA b/l; sclera clear b/l; b/l external auditory canals WNL; b/l TM WNL; nares patent; oropharynx clear but posterior aspect is red; oral mucosa moist; tongue midline; dentition adequate; very congested; sinus pain with percussion    Neck: No adenopathy; no thyromegaly  CV: Regular rate and rhythm; S1/ S2 present; no murmur, gallop or rub noted  Pulm: No respiratory distress; clear to ascultation b/l; no wheezing or stridor noted b/l   Abd: Adequate bowel sounds noted; soft and nontender; no rebound, rigidity, nor distention  Extremities: No peripheral edema b/l LE extremities/ no clubbing nor cyanosis noted  Skin: Warm and dry; no rashes noted   Neuro: No focal deficits noted   Psych: AAOx4; mood and affect are appropriate    A/P:  1. Acute recurrent pansinusitis  Patient has been feeling increased symptoms since last Wednesday and has recurrent sinus infection history as detailed above in HPI. Recommend patient start abx and Medrol dose pack today, rest, hydrate, continue sinus rinses daily, and follow clinical response. New RX sent to pharmacy.   - azithromycin (ZITHROMAX) 500 MG tablet; Take 1 Tablet by mouth every day for 7 days.  Dispense: 7 Tablet; Refill: 0  - methylPREDNISolone (MEDROL DOSEPAK) 4 MG Tablet Therapy Pack; As directed on the packaging label.  Dispense: 21 Tablet; Refill: 0    2. Panlobular emphysema (HCC)  New condition per recent chest CT done 1/30/25 for ongoing dry cough and dyspnea on exertion. " Patient has extensive history of second hand smoke exposure but is a non smoker herself. She has used Albuterol inhaler recently PRN with symptom improvement but would prefer her to use daily inhaler to try to avoid associated impacts on heart rate. New RX sent to pharmacy, and new referral made to Dr. Jaimes for PFTs and further specialty evaluation.   - fluticasone furoate-vilanterol (BREO) 100-25 MCG/ACT AEROSOL POWDER, BREATH ACTIVATED; Inhale 1 Puff every day.  Dispense: 60 Each; Refill: 3  - Referral to Pulmonary and Sleep Medicine    3. Dyspnea on exertion  Ongoing issue for patient as detailed above in HPI. I suspect that this condition is multi factorial in origin, and she has an echo pending at [facility]. I also feel that there is a pulmonary component to this condition, and she will benefit from PFTs/pulmonology evaluation.   - Referral to Pulmonary and Sleep Medicine    4. Obstructive sleep apnea of adult  Patient recently diagnosed with mild sleep apnea and I recommend follow up with [facility] Sleep team for further management.     5. Encounter for screening mammogram for breast cancer  Patient is due for annual screening mammogram and will make imaging appointment accordingly.   - MA-SCREENING MAMMO BILAT W/TOMOSYNTHESIS W/CAD; Future    6. Migraine syndrome  Chronic condition for patient     7. Cervicogenic headache  Patient reports experiencing new daily headache starting at back of her skull and moving forward - different than her typical migraine headaches. Patient denies recent history of trauma or related triggers. She has MR brain ordered and is working on scheduling imaging exam as soon as able. Will follow up on results when available.     8. Primary hypertension  Patient reports recent history of blood pressure and heart rate management, and Amlodipine 5 mg daily was added to her medication routine on 1/22/25. Home blood pressure log reviewed and will follow up further at our  upcoming appointment together. Recommend patient continue to maintain home blood pressure/heart rate log.     9. Obesity (BMI 30-39.9)  Improved/ patient has been intentionally losing weight via intermittent fasting and healthy food choices. Will follow.     10. Hyperglycemia  HgA1c is 6.0% and patient has tried GTP-1 compounded medication in past without success tolerance. Will follow closely.     11. Cervical cancer screening  Patient sees Dr. Pradhan and her team for GYN care and has IUD in place. Karlie WHITEHEAD will request records for her chart.     12. Health care maintenance  Will draw Vitamin D level at upcoming appointment for further medical management. Patient has abnormal IG levels on previous lab work, and I recommend patient see [facility] for immunology evaluation (she has referral in place per report).   - VITAMIN D,25 HYDROXY (DEFICIENCY); Future       Time spent 60 minutes     Recommend patient follow up with me in two weeks for recheck and further medical management.

## 2025-02-14 ENCOUNTER — HOSPITAL ENCOUNTER (OUTPATIENT)
Dept: RADIOLOGY | Facility: MEDICAL CENTER | Age: 50
End: 2025-02-14
Attending: FAMILY MEDICINE
Payer: COMMERCIAL

## 2025-02-14 ENCOUNTER — RESULTS FOLLOW-UP (OUTPATIENT)
Dept: INTERNAL MEDICINE | Facility: IMAGING CENTER | Age: 50
End: 2025-02-14

## 2025-02-14 DIAGNOSIS — Z12.31 ENCOUNTER FOR SCREENING MAMMOGRAM FOR BREAST CANCER: ICD-10-CM

## 2025-02-14 PROCEDURE — 77067 SCR MAMMO BI INCL CAD: CPT

## 2025-02-19 ENCOUNTER — HOSPITAL ENCOUNTER (OUTPATIENT)
Facility: MEDICAL CENTER | Age: 50
End: 2025-02-19
Attending: FAMILY MEDICINE
Payer: COMMERCIAL

## 2025-02-19 ENCOUNTER — OFFICE VISIT (OUTPATIENT)
Dept: INTERNAL MEDICINE | Facility: IMAGING CENTER | Age: 50
End: 2025-02-19
Payer: COMMERCIAL

## 2025-02-19 VITALS
DIASTOLIC BLOOD PRESSURE: 74 MMHG | OXYGEN SATURATION: 92 % | WEIGHT: 242.51 LBS | HEART RATE: 90 BPM | HEIGHT: 68 IN | BODY MASS INDEX: 36.75 KG/M2 | RESPIRATION RATE: 17 BRPM | TEMPERATURE: 97.9 F | SYSTOLIC BLOOD PRESSURE: 126 MMHG

## 2025-02-19 DIAGNOSIS — E55.9 VITAMIN D DEFICIENCY: ICD-10-CM

## 2025-02-19 DIAGNOSIS — G44.86 CERVICOGENIC HEADACHE: ICD-10-CM

## 2025-02-19 DIAGNOSIS — G47.33 OBSTRUCTIVE SLEEP APNEA OF ADULT: ICD-10-CM

## 2025-02-19 DIAGNOSIS — I10 PRIMARY HYPERTENSION: ICD-10-CM

## 2025-02-19 DIAGNOSIS — J32.9 RHINOSINUSITIS: ICD-10-CM

## 2025-02-19 DIAGNOSIS — R06.09 DYSPNEA ON EXERTION: ICD-10-CM

## 2025-02-19 DIAGNOSIS — Z00.00 HEALTH CARE MAINTENANCE: ICD-10-CM

## 2025-02-19 DIAGNOSIS — R93.89 ABNORMAL CHEST CT: ICD-10-CM

## 2025-02-19 LAB — 25(OH)D3 SERPL-MCNC: 25 NG/ML (ref 30–100)

## 2025-02-19 PROCEDURE — 82306 VITAMIN D 25 HYDROXY: CPT

## 2025-02-19 PROCEDURE — 3078F DIAST BP <80 MM HG: CPT | Performed by: FAMILY MEDICINE

## 2025-02-19 PROCEDURE — 99214 OFFICE O/P EST MOD 30 MIN: CPT | Performed by: FAMILY MEDICINE

## 2025-02-19 PROCEDURE — 3074F SYST BP LT 130 MM HG: CPT | Performed by: FAMILY MEDICINE

## 2025-02-19 ASSESSMENT — FIBROSIS 4 INDEX: FIB4 SCORE: 0.7

## 2025-02-20 PROBLEM — E55.9 VITAMIN D DEFICIENCY: Status: ACTIVE | Noted: 2025-02-20

## 2025-02-20 PROBLEM — R93.89 ABNORMAL CHEST CT: Status: ACTIVE | Noted: 2025-02-20

## 2025-02-20 NOTE — PROGRESS NOTES
"Chief Complaint   Patient presents with    Follow-Up       HPI:  Patient is a 49 y.o. female established patient who presents today for a follow up appointment. Patient is feeling much better today as compared to how she was feeling at our visit together on 2/3/25.     Patient Active Problem List    Diagnosis Date Noted    Vitamin D deficiency 02/20/2025    Abnormal chest CT 02/20/2025    History of melanoma excision 02/03/2025    Dyspnea on exertion 01/14/2025    Obstructive sleep apnea of adult 12/03/2024    Snoring 12/03/2024    GENEVIEVE (generalized anxiety disorder) 05/24/2023    History of COVID-19 12/01/2020    Dry mouth 09/29/2020    Obesity (BMI 30-39.9) 09/13/2019    Mixed dyslipidemia 09/13/2019    SVT (supraventricular tachycardia) (HCC) 09/08/2019    Hypertension 09/08/2019    GERD (gastroesophageal reflux disease) 09/08/2019    Hyperglycemia 09/08/2019    Cervicogenic headache 12/12/2018    Migraine syndrome 03/28/2018       Past medical, surgical, family, and social history was reviewed and updated in Epic chart by me today.     Medications and allergies reviewed and updated in Epic chart by me today.     ROS:  Pertinent positives listed above in HPI. All other systems have been reviewed and are negative.    PE:   /74 (BP Location: Left arm, Patient Position: Sitting, BP Cuff Size: Adult)   Pulse 90   Temp 36.6 °C (97.9 °F) (Temporal)   Resp 17   Ht 1.727 m (5' 8\")   Wt 110 kg (242 lb 8.1 oz)   SpO2 92%   BMI 36.87 kg/m²   Vital signs reviewed with patient.     Gen: Well developed; well nourished; no acute distress; age appropriate appearance   HEENT: Normocephalic; atraumatic; PEERLA b/l; sclera clear b/l; b/l external auditory canals WNL; b/l TM WNL; nares patent; oropharynx clear; oral mucosa moist; tongue midline; dentition adequate   Neck: No adenopathy; no thyromegaly  CV: Regular rate and rhythm; S1/ S2 present; no murmur, gallop or rub noted  Pulm: No respiratory distress; clear to " ascultation b/l; no wheezing or stridor noted b/l  Extremities: No peripheral edema b/l LE extremities/ no clubbing nor cyanosis noted  Skin: Warm and dry; no rashes noted   Neuro: No focal deficits noted   Psych: AAOx4; mood and affect are appropriate    A/P:  1. Primary hypertension  Markedly improved - recommend patient continue Norvasc 5 mg and Hyzaar 100-25 mg daily use. Echo is scheduled for March 3, and I will follow up with patient when results are available.     2. Rhinosinusitis  Markedly improved with recent treatment course. Will follow.     3. Cervicogenic headache  Patient reports that headaches are much less now that she has improved blood pressure control as well as less sinus symptoms. She is planning on cancelling MR brain ordered by her prior primary care provider, and she will let me know if condition returns.     4. Abnormal chest CT/ Dyspnea on exertion  Patient has referral to Dr. Jaimes in place for specialty evaluation of chest CT done 1/30/25, and she reports improvement in overall breathing since starting Breo daily.     5. Vitamin D deficiency  Vitamin D level will be drawn at visit today.     6. Obstructive sleep apnea of adult  Mild per recent sleep study managed by [facility] Sleep Team.      She also have appointment at [facility] on June 6 for immunology evaluation.

## 2025-03-17 ENCOUNTER — APPOINTMENT (OUTPATIENT)
Dept: RADIOLOGY | Facility: MEDICAL CENTER | Age: 50
End: 2025-03-17
Attending: NURSE PRACTITIONER
Payer: COMMERCIAL

## 2025-03-31 NOTE — Clinical Note
REFERRAL APPROVAL NOTICE         Sent on March 31, 2025                   Reta Lam  [address] [place] [address]                   Dear Ms. Genaro,    After a careful review of the medical information and benefit coverage, [facility] has processed your referral. See below for additional details.    If applicable, you must be actively enrolled with your insurance for coverage of the authorized service. If you have any questions regarding your coverage, please contact your insurance directly.    REFERRAL INFORMATION   Referral #:  [id number]  Referred-To Department    Referred-By Provider:  Pulmonology    ANGELLA Urena   Pulmonary Rehab [facility]      [address]  [place] [address]  [phone] [address] [facility] [place] [address]  [phone]    Referral Start Date:  03/21/2025  Referral End Date:   03/21/2026             SCHEDULING  If you do not already have an appointment, please call [phone] to make an appointment.     MORE INFORMATION  If you do not already have a [facility] account, sign up at: [email or web address]  You can access your medical information, make appointments, see lab results, billing information, and more.  If you have questions regarding this referral, please contact  the [facility] Referrals department at:             [phone]. Monday - Friday 8:00AM - 5:00PM.     Sincerely,    [facility]

## 2025-04-03 ENCOUNTER — HOSPITAL ENCOUNTER (OUTPATIENT)
Dept: PULMONOLOGY | Facility: MEDICAL CENTER | Age: 50
End: 2025-04-03
Attending: NURSE PRACTITIONER
Payer: COMMERCIAL

## 2025-04-03 DIAGNOSIS — R06.02 SOB (SHORTNESS OF BREATH): ICD-10-CM

## 2025-04-03 DIAGNOSIS — J44.9 CHRONIC OBSTRUCTIVE PULMONARY DISEASE, UNSPECIFIED COPD TYPE (HCC): ICD-10-CM

## 2025-04-03 DIAGNOSIS — R93.89 ABNORMAL CHEST CT: ICD-10-CM

## 2025-04-04 ENCOUNTER — NON-PROVIDER VISIT (OUTPATIENT)
Dept: SLEEP MEDICINE | Facility: MEDICAL CENTER | Age: 50
End: 2025-04-04
Attending: INTERNAL MEDICINE
Payer: COMMERCIAL

## 2025-04-04 VITALS — WEIGHT: 243 LBS | HEIGHT: 68 IN | BODY MASS INDEX: 36.83 KG/M2

## 2025-04-04 DIAGNOSIS — J44.9 CHRONIC OBSTRUCTIVE PULMONARY DISEASE, UNSPECIFIED COPD TYPE (HCC): ICD-10-CM

## 2025-04-04 DIAGNOSIS — R06.02 SOB (SHORTNESS OF BREATH): ICD-10-CM

## 2025-04-04 DIAGNOSIS — R93.89 ABNORMAL CHEST CT: ICD-10-CM

## 2025-04-04 DIAGNOSIS — J98.11 CHRONIC ATELECTASIS: Chronic | ICD-10-CM

## 2025-04-04 PROCEDURE — 94729 DIFFUSING CAPACITY: CPT | Mod: 26 | Performed by: INTERNAL MEDICINE

## 2025-04-04 PROCEDURE — 94060 EVALUATION OF WHEEZING: CPT | Mod: 26 | Performed by: INTERNAL MEDICINE

## 2025-04-04 PROCEDURE — 94726 PLETHYSMOGRAPHY LUNG VOLUMES: CPT | Mod: 26 | Performed by: INTERNAL MEDICINE

## 2025-04-04 PROCEDURE — 94729 DIFFUSING CAPACITY: CPT | Performed by: INTERNAL MEDICINE

## 2025-04-04 PROCEDURE — 94060 EVALUATION OF WHEEZING: CPT | Performed by: INTERNAL MEDICINE

## 2025-04-04 PROCEDURE — 94726 PLETHYSMOGRAPHY LUNG VOLUMES: CPT | Performed by: INTERNAL MEDICINE

## 2025-04-04 ASSESSMENT — FIBROSIS 4 INDEX: FIB4 SCORE: 0.7

## 2025-04-04 NOTE — PROCEDURES
Technician: HAKEEM Dash    Technician Comment:  Good patient effort & cooperation.  The results of this test meet the ATS/ERS standards for acceptability & reproducibility.  Test was performed on the [employer] Body Plethysmograph-Elite DX system.  Predicted values were GLI-2012 for spirometry, GLI-2020 for DLCO, ITS for Lung Volumes.  The DLCO was uncorrected for Hgb.  A bronchodilator of Albuterol HFA -2puffs via spacer administered.  DLCO performed during dilation period.    Interpretation:  The patient has an obstructive ventilatory defect which is moderate (60-69%) by postbronchodilator percent predicted FEV1.  There is a negative bronchodilator response.  Flow volume loops are consistent with spirometric data.  Full lung volumes demonstrate air trapping as well as a reduction in ERV likely secondary to BMI of 37.5.  The DLCO is elevated.    Summary:  There are findings consistent with small airways disease such as asthma with air trapping as well as chronic atelectasis secondary to obesity.  Weight loss is advised.    I, Cecil Briseno DO, am the author of this note.     Cecil Briseno DO, [facility]  Staff Pulmonologist and Intensivist  [facility]     Please note that this dictation was created using voice recognition software. The accuracy of the dictation is limited to the abilities of the software. I have made every reasonable attempt to correct obvious errors, but I expect that there are errors of grammar and possibly content that I did not discover before finalizing the note.

## 2025-04-05 ENCOUNTER — OFFICE VISIT (OUTPATIENT)
Dept: URGENT CARE | Facility: CLINIC | Age: 50
End: 2025-04-05
Payer: COMMERCIAL

## 2025-04-05 VITALS
TEMPERATURE: 97.2 F | WEIGHT: 247 LBS | DIASTOLIC BLOOD PRESSURE: 86 MMHG | HEART RATE: 103 BPM | RESPIRATION RATE: 16 BRPM | OXYGEN SATURATION: 92 % | SYSTOLIC BLOOD PRESSURE: 118 MMHG | HEIGHT: 67 IN | BODY MASS INDEX: 38.77 KG/M2

## 2025-04-05 DIAGNOSIS — T78.40XA ALLERGIC REACTION, INITIAL ENCOUNTER: ICD-10-CM

## 2025-04-05 DIAGNOSIS — L03.211 CELLULITIS, FACE: ICD-10-CM

## 2025-04-05 PROCEDURE — 99214 OFFICE O/P EST MOD 30 MIN: CPT | Performed by: FAMILY MEDICINE

## 2025-04-05 PROCEDURE — 3079F DIAST BP 80-89 MM HG: CPT | Performed by: FAMILY MEDICINE

## 2025-04-05 PROCEDURE — 3074F SYST BP LT 130 MM HG: CPT | Performed by: FAMILY MEDICINE

## 2025-04-05 RX ORDER — CLINDAMYCIN HYDROCHLORIDE 300 MG/1
300 CAPSULE ORAL 3 TIMES DAILY
Qty: 15 CAPSULE | Refills: 0 | Status: SHIPPED | OUTPATIENT
Start: 2025-04-05 | End: 2025-04-10

## 2025-04-05 RX ORDER — METHYLPREDNISOLONE 4 MG/1
TABLET ORAL
Qty: 21 TABLET | Refills: 0 | Status: SHIPPED | OUTPATIENT
Start: 2025-04-05

## 2025-04-05 ASSESSMENT — ENCOUNTER SYMPTOMS
SHORTNESS OF BREATH: 0
NAUSEA: 0
FEVER: 0
DIZZINESS: 0
EYE REDNESS: 0
CHILLS: 0
COUGH: 0
SORE THROAT: 0
VOMITING: 0
MYALGIAS: 0

## 2025-04-05 ASSESSMENT — FIBROSIS 4 INDEX: FIB4 SCORE: 0.7

## 2025-04-05 NOTE — PROGRESS NOTES
"Subjective:   Reta Lam is a 49 y.o. female who presents for Eye Swelling (Swelling around eyes, itchy throat x 1 day/Pt thinks it was an allergic reaction and took benadryl)        49-year-old presents to the urgent care with report of redness and swelling of the face.  Reports initial papule described as\" pimple\" on the right upper eyelid in which patient applied manual pressure and had drainage and initial resolution and thereafter noted redness and swelling in the right side of the face.  Patient reports eating a peanut sauce yesterday and noted worsening of the swelling to both sides of the face with itchy swollen eyelids and itching of the throat.  Reports initial minimal improvement with diphenhydramine at 200 mg which exacerbated the patient's restless leg syndrome.  Reports history of seasonal allergies.    Rash  This is a new problem. The current episode started more than 1 year ago. The problem is unchanged. The affected locations include the face. The rash is characterized by redness, swelling and itchiness. Associated with: Peanut sauce. Pertinent negatives include no cough, fever, shortness of breath, sore throat or vomiting. Past treatments include antihistamine. The treatment provided mild relief. Her past medical history is significant for allergies.     PMH:  has a past medical history of Chronic atelectasis (04/04/2025), GERD (gastroesophageal reflux disease), Heart burn, High cholesterol, Hypertension, and Migraine.    She has no past medical history of Bowel habit changes or Encounter for long-term (current) use of other medications.  MEDS:   Current Outpatient Medications:     methylPREDNISolone (MEDROL DOSEPAK) 4 MG Tablet Therapy Pack, Follow schedule on package instructions., Disp: 21 Tablet, Rfl: 0    clindamycin (CLEOCIN) 300 MG Cap, Take 1 Capsule by mouth 3 times a day for 5 days., Disp: 15 Capsule, Rfl: 0    amLODIPine (NORVASC) 5 MG Tab, Take 5 mg by mouth every day., " Disp: , Rfl:     fluticasone furoate-vilanterol (BREO) 100-25 MCG/ACT AEROSOL POWDER, BREATH ACTIVATED, Inhale 1 Puff every day., Disp: 60 Each, Rfl: 3    Azelastine (ASTELIN) 137 MCG/SPRAY Solution, , Disp: , Rfl:     losartan-hydrochlorothiazide (HYZAAR) 100-25 MG per tablet, , Disp: , Rfl:     nortriptyline (PAMELOR) 10 MG Cap, Take 3 Capsules by mouth every day., Disp: 270 Capsule, Rfl: 1    escitalopram (LEXAPRO) 10 MG Tab, Take 1 Tablet by mouth every day., Disp: 30 Tablet, Rfl: 3    propranolol LA (INDERAL LA) 60 MG CAPSULE SR 24 HR, Take 1 Capsule by mouth every day., Disp: 90 Capsule, Rfl: 1    omeprazole (PRILOSEC) 20 MG delayed-release capsule, Take 1 Capsule by mouth every evening., Disp: 90 Capsule, Rfl: 3    atorvastatin (LIPITOR) 40 MG Tab, Take 1 Tablet by mouth at bedtime., Disp: 90 Tablet, Rfl: 3    albuterol 108 (90 Base) MCG/ACT Aero Soln inhalation aerosol, Inhale 2 Puffs every four hours as needed (chest tightness/discomfort)., Disp: 18 g, Rfl: 1    Levocetirizine Dihydrochloride 5 MG Tab, Take  by mouth., Disp: , Rfl:     Cyanocobalamin (VITAMIN B-12) 1000 MCG Tab, Take 1,000 mcg by mouth every day., Disp: , Rfl:     ibuprofen (MOTRIN) 200 MG Tab, Take 400 mg by mouth every 6 hours as needed for Mild Pain., Disp: , Rfl:     Multiple Vitamin (MULTI-VITAMIN PO), Take 1 Tab by mouth every bedtime., Disp: , Rfl:     Levonorgestrel (MIRENA, 52 MG, IU), 1 Each by Intrauterine route see administration instructions. Every 5 years  Indications: replaced 2021, Disp: , Rfl:     Cholecalciferol (VITAMIN D3) 5000 UNITS Cap, Take 5,000 Units by mouth at bedtime., Disp: , Rfl:     scopolamine (TRANSDERM-SCOP) 1 mg/72hr PATCH 72 HR, , Disp: , Rfl:     EMGALITY 120 MG/ML Solution Auto-injector, First, inject the contents of two pens under the skin once as a loading dose. (Patient not taking: Reported on 9/19/2022), Disp: 3 mL, Rfl: 1  ALLERGIES:   Allergies   Allergen Reactions    Codeine Vomiting and Nausea  "    SURGHX:   Past Surgical History:   Procedure Laterality Date    SINUSOTOMIES  04/2024    SEPTOPLASTY Bilateral 06/17/2016    Procedure: SEPTOPLASTY;  Surgeon: Argelia Cheng M.D.;  Location: SURGERY [facility];  Service:     TURBINOPLASTY Bilateral 06/17/2016    Procedure: TURBINOPLASTY;  Surgeon: Argelia Cheng M.D.;  Location: SURGERY [facility];  Service:     ANTROSTOMY Bilateral 06/17/2016    Procedure: ANTROSTOMY - ENDOSCOPIC MAXILLARY;  Surgeon: Argelia Cheng M.D.;  Location: [facility];  Service:     PRIMARY C SECTION  11/30/2011    Performed by DANIA PERSAUD at [facility]    TONSILLECTOMY  01/01/1991     SOCHX:  reports that she has never smoked. She has been exposed to tobacco smoke. She has never used smokeless tobacco. She reports that she does not currently use alcohol. She reports that she does not use drugs.  FH:   Family History   Problem Relation Age of Onset    Hypertension Father     Other Maternal Grandmother         Eye concerns    Hypertension Maternal Grandmother      Review of Systems   Constitutional:  Negative for chills and fever.   HENT:  Negative for sore throat.    Eyes:  Negative for redness.   Respiratory:  Negative for cough and shortness of breath.    Gastrointestinal:  Negative for nausea and vomiting.   Musculoskeletal:  Negative for myalgias.   Skin:  Positive for itching and rash.   Neurological:  Negative for dizziness.        Objective:   /86 (BP Location: Left arm, Patient Position: Sitting, BP Cuff Size: Adult)   Pulse (!) 103   Temp 36.2 °C (97.2 °F) (Temporal)   Resp 16   Ht 1.702 m (5' 7\")   Wt 112 kg (247 lb)   SpO2 92%   BMI 38.69 kg/m²   Physical Exam  Vitals and nursing note reviewed.   Constitutional:       General: She is not in acute distress.     Appearance: She is well-developed.   HENT:      Head: Normocephalic and atraumatic.        Right Ear: External ear normal.      Left Ear: External ear " normal.      Nose: Congestion present.      Mouth/Throat:      Mouth: Mucous membranes are moist.      Pharynx: No oropharyngeal exudate or posterior oropharyngeal erythema.   Eyes:      Conjunctiva/sclera: Conjunctivae normal.     Cardiovascular:      Rate and Rhythm: Normal rate.   Pulmonary:      Effort: Pulmonary effort is normal. No respiratory distress.      Breath sounds: Normal breath sounds. No wheezing or rhonchi.   Abdominal:      General: There is no distension.   Musculoskeletal:         General: Normal range of motion.   Skin:     General: Skin is warm and dry.   Neurological:      General: No focal deficit present.      Mental Status: She is alert and oriented to person, place, and time. Mental status is at baseline.      Gait: Gait (gait at baseline) normal.   Psychiatric:         Judgment: Judgment normal.           Assessment/Plan:   1. Cellulitis, face  - clindamycin (CLEOCIN) 300 MG Cap; Take 1 Capsule by mouth 3 times a day for 5 days.  Dispense: 15 Capsule; Refill: 0    2. Allergic reaction, initial encounter  - methylPREDNISolone (MEDROL DOSEPAK) 4 MG Tablet Therapy Pack; Follow schedule on package instructions.  Dispense: 21 Tablet; Refill: 0    Medical decision-making/course: The patient remained afebrile, hemodynamically and neurologically stable with no evidence of respiratory compromise throughout the urgent care course.  There was no immediate clinical indication for the necessity of emergency department evaluation or inpatient admission and the patient was amendable to a trial of outpatient management.          In the course of preparing for this visit with review of the pertinent past medical history, recent and past clinic visits, current medications, and performing chart, immunization, medical history and medication reconciliation, and in the further course of obtaining the current history pertinent to the clinic visit today, performing an exam and evaluation, ordering and  independently evaluating labs, tests  , and/or procedures, prescribing any recommended new medications as noted above, providing any pertinent counseling and education and recommending further coordination of care including recommendations for symptomatic and supportive measures, at least  18 minutes of total time were spent during this encounter.      Discussed close monitoring, return precautions, and supportive measures of maintaining adequate fluid hydration and caloric intake, relative rest and symptom management as needed for pain and/or fever.    Differential diagnosis, natural history, supportive care, and indications for immediate follow-up discussed.     Advised the patient to follow-up with the primary care physician for recheck, reevaluation, and consideration of further management.    Please note that this dictation was created using voice recognition software. I have worked with consultants from the vendor as well as technical experts from [employer] to optimize the interface. I have made every reasonable attempt to correct obvious errors, but I expect that there are errors of grammar and possibly content that I did not discover before finalizing the note.

## 2025-04-05 NOTE — PATIENT INSTRUCTIONS
Cellulitis, Adult    Cellulitis is a skin infection. The infected area is often warm, red, swollen, and sore. It occurs most often in the arms and lower legs. It is very important to get treated for this condition.  What are the causes?  This condition is caused by bacteria. The bacteria enter through a break in the skin, such as a cut, burn, insect bite, open sore, or crack.  What increases the risk?  This condition is more likely to occur in people who:  Have a weak body defense system (immune system).  Have open cuts, burns, bites, or scrapes on the skin.  Are older than 60 years of age.  Have a blood sugar problem (diabetes).  Have a long-lasting (chronic) liver disease (cirrhosis) or kidney disease.  Are very overweight (obese).  Have a skin problem, such as:  Itchy rash (eczema).  Slow movement of blood in the veins (venous stasis).  Fluid buildup below the skin (edema).  Have been treated with high-energy rays (radiation).  Use IV drugs.  What are the signs or symptoms?  Symptoms of this condition include:  Skin that is:  Red.  Streaking.  Spotting.  Swollen.  Sore or painful when you touch it.  Warm.  A fever.  Chills.  Blisters.  How is this diagnosed?  This condition is diagnosed based on:  Medical history.  Physical exam.  Blood tests.  Imaging tests.  How is this treated?  Treatment for this condition may include:  Medicines to treat infections or allergies.  Home care, such as:  Rest.  Placing cold or warm cloths (compresses) on the skin.  Hospital care, if the condition is very bad.  Follow these instructions at home:  Medicines  Take over-the-counter and prescription medicines only as told by your doctor.  If you were prescribed an antibiotic medicine, take it as told by your doctor. Do not stop taking it even if you start to feel better.  General instructions    Drink enough fluid to keep your pee (urine) pale yellow.  Do not touch or rub the infected area.  Raise (elevate) the infected area above  the level of your heart while you are sitting or lying down.  Place cold or warm cloths on the area as told by your doctor.  Keep all follow-up visits as told by your doctor. This is important.  Contact a doctor if:  You have a fever.  You do not start to get better after 1-2 days of treatment.  Your bone or joint under the infected area starts to hurt after the skin has healed.  Your infection comes back. This can happen in the same area or another area.  You have a swollen bump in the area.  You have new symptoms.  You feel ill and have muscle aches and pains.  Get help right away if:  Your symptoms get worse.  You feel very sleepy.  You throw up (vomit) or have watery poop (diarrhea) for a long time.  You see red streaks coming from the area.  Your red area gets larger.  Your red area turns dark in color.  These symptoms may represent a serious problem that is an emergency. Do not wait to see if the symptoms will go away. Get medical help right away. Call your local emergency services (911 in the [place] Do not drive yourself to the hospital.  Summary  Cellulitis is a skin infection. The area is often warm, red, swollen, and sore.  This condition is treated with medicines, rest, and cold and warm cloths.  Take all medicines only as told by your doctor.  Tell your doctor if symptoms do not start to get better after 1-2 days of treatment.  This information is not intended to replace advice given to you by your health care provider. Make sure you discuss any questions you have with your health care provider.  Document Revised: 09/29/2022 Document Reviewed: 09/29/2022  Elsevier Patient Education © 2023 [employer]

## 2025-04-10 DIAGNOSIS — I10 PRIMARY HYPERTENSION: ICD-10-CM

## 2025-04-10 RX ORDER — AMLODIPINE BESYLATE 5 MG/1
5 TABLET ORAL DAILY
Qty: 100 TABLET | Refills: 3 | Status: SHIPPED | OUTPATIENT
Start: 2025-04-10

## 2025-05-02 DIAGNOSIS — G43.709 CHRONIC MIGRAINE W/O AURA W/O STATUS MIGRAINOSUS, NOT INTRACTABLE: ICD-10-CM

## 2025-05-02 DIAGNOSIS — G43.019 INTRACTABLE MIGRAINE WITHOUT AURA AND WITHOUT STATUS MIGRAINOSUS: ICD-10-CM

## 2025-05-02 RX ORDER — NORTRIPTYLINE HYDROCHLORIDE 10 MG/1
30 CAPSULE ORAL DAILY
Qty: 270 CAPSULE | Refills: 3 | Status: SHIPPED | OUTPATIENT
Start: 2025-05-02

## 2025-05-02 RX ORDER — PROPRANOLOL HYDROCHLORIDE 10 MG/1
10 TABLET ORAL 3 TIMES DAILY
Qty: 270 TABLET | Refills: 3 | Status: SHIPPED
Start: 2025-05-02 | End: 2025-05-02 | Stop reason: CLARIF

## 2025-05-07 ENCOUNTER — APPOINTMENT (OUTPATIENT)
Dept: URBAN - METROPOLITAN AREA CLINIC 6 | Facility: CLINIC | Age: 50
Setting detail: DERMATOLOGY
End: 2025-05-07

## 2025-05-07 DIAGNOSIS — R23.1 PALLOR: ICD-10-CM

## 2025-05-07 DIAGNOSIS — D22 MELANOCYTIC NEVI: ICD-10-CM

## 2025-05-07 DIAGNOSIS — Z85.820 PERSONAL HISTORY OF MALIGNANT MELANOMA OF SKIN: ICD-10-CM

## 2025-05-07 DIAGNOSIS — L81.4 OTHER MELANIN HYPERPIGMENTATION: ICD-10-CM

## 2025-05-07 DIAGNOSIS — D18.0 HEMANGIOMA: ICD-10-CM

## 2025-05-07 DIAGNOSIS — L30.0 NUMMULAR DERMATITIS: ICD-10-CM

## 2025-05-07 DIAGNOSIS — Z71.89 OTHER SPECIFIED COUNSELING: ICD-10-CM

## 2025-05-07 DIAGNOSIS — L82.1 OTHER SEBORRHEIC KERATOSIS: ICD-10-CM

## 2025-05-07 PROBLEM — D48.5 NEOPLASM OF UNCERTAIN BEHAVIOR OF SKIN: Status: ACTIVE | Noted: 2025-05-07

## 2025-05-07 PROBLEM — D22.62 MELANOCYTIC NEVI OF LEFT UPPER LIMB, INCLUDING SHOULDER: Status: ACTIVE | Noted: 2025-05-07

## 2025-05-07 PROBLEM — D22.39 MELANOCYTIC NEVI OF OTHER PARTS OF FACE: Status: ACTIVE | Noted: 2025-05-07

## 2025-05-07 PROBLEM — D22.61 MELANOCYTIC NEVI OF RIGHT UPPER LIMB, INCLUDING SHOULDER: Status: ACTIVE | Noted: 2025-05-07

## 2025-05-07 PROBLEM — D22.5 MELANOCYTIC NEVI OF TRUNK: Status: ACTIVE | Noted: 2025-05-07

## 2025-05-07 PROBLEM — D18.01 HEMANGIOMA OF SKIN AND SUBCUTANEOUS TISSUE: Status: ACTIVE | Noted: 2025-05-07

## 2025-05-07 PROBLEM — D22.72 MELANOCYTIC NEVI OF LEFT LOWER LIMB, INCLUDING HIP: Status: ACTIVE | Noted: 2025-05-07

## 2025-05-07 PROBLEM — D22.71 MELANOCYTIC NEVI OF RIGHT LOWER LIMB, INCLUDING HIP: Status: ACTIVE | Noted: 2025-05-07

## 2025-05-07 PROBLEM — D23.62 OTHER BENIGN NEOPLASM OF SKIN OF LEFT UPPER LIMB, INCLUDING SHOULDER: Status: ACTIVE | Noted: 2025-05-07

## 2025-05-07 PROCEDURE — ? ADDITIONAL NOTES

## 2025-05-07 PROCEDURE — ? PRESCRIPTION

## 2025-05-07 PROCEDURE — 99213 OFFICE O/P EST LOW 20 MIN: CPT | Mod: 25

## 2025-05-07 PROCEDURE — ? BIOPSY BY SHAVE METHOD

## 2025-05-07 PROCEDURE — ? COUNSELING

## 2025-05-07 PROCEDURE — ? SUNSCREEN TREATMENT REGIMEN

## 2025-05-07 PROCEDURE — 11102 TANGNTL BX SKIN SINGLE LES: CPT

## 2025-05-07 RX ORDER — TRIAMCINOLONE ACETONIDE 1 MG/G
OINTMENT TOPICAL
Qty: 30 | Refills: 1 | Status: ERX | COMMUNITY
Start: 2025-05-07

## 2025-05-07 RX ADMIN — TRIAMCINOLONE ACETONIDE: 1 OINTMENT TOPICAL at 00:00

## 2025-05-07 ASSESSMENT — LOCATION DETAILED DESCRIPTION DERM
LOCATION DETAILED: RIGHT DORSAL FOOT
LOCATION DETAILED: SUPERIOR THORACIC SPINE
LOCATION DETAILED: LEFT PERIAREOLAR BREAST 2-3:00 REGION
LOCATION DETAILED: LEFT DISTAL PRETIBIAL REGION
LOCATION DETAILED: RIGHT INFERIOR CENTRAL MALAR CHEEK
LOCATION DETAILED: LEFT DORSAL FOOT
LOCATION DETAILED: LEFT MID-UPPER BACK
LOCATION DETAILED: LEFT PROXIMAL PRETIBIAL REGION
LOCATION DETAILED: RIGHT DISTAL POSTERIOR UPPER ARM
LOCATION DETAILED: LEFT ANTERIOR PROXIMAL THIGH
LOCATION DETAILED: LEFT DISTAL POSTERIOR UPPER ARM
LOCATION DETAILED: LEFT PROXIMAL POSTERIOR UPPER ARM
LOCATION DETAILED: LEFT INFERIOR MEDIAL MALAR CHEEK
LOCATION DETAILED: RIGHT ANTERIOR PROXIMAL THIGH
LOCATION DETAILED: LEFT PROXIMAL DORSAL FOREARM
LOCATION DETAILED: RIGHT PROXIMAL POSTERIOR UPPER ARM
LOCATION DETAILED: RIGHT MEDIAL UPPER BACK
LOCATION DETAILED: RIGHT PROXIMAL PRETIBIAL REGION
LOCATION DETAILED: RIGHT DISTAL PRETIBIAL REGION
LOCATION DETAILED: RIGHT PROXIMAL DORSAL FOREARM
LOCATION DETAILED: LOWER STERNUM

## 2025-05-07 ASSESSMENT — LOCATION SIMPLE DESCRIPTION DERM
LOCATION SIMPLE: RIGHT UPPER BACK
LOCATION SIMPLE: RIGHT FOOT
LOCATION SIMPLE: RIGHT FOREARM
LOCATION SIMPLE: LEFT UPPER BACK
LOCATION SIMPLE: RIGHT PRETIBIAL REGION
LOCATION SIMPLE: LEFT THIGH
LOCATION SIMPLE: LEFT CHEEK
LOCATION SIMPLE: LEFT FOOT
LOCATION SIMPLE: RIGHT THIGH
LOCATION SIMPLE: LEFT BREAST
LOCATION SIMPLE: RIGHT CHEEK
LOCATION SIMPLE: CHEST
LOCATION SIMPLE: LEFT FOREARM
LOCATION SIMPLE: RIGHT POSTERIOR UPPER ARM
LOCATION SIMPLE: LEFT POSTERIOR UPPER ARM
LOCATION SIMPLE: LEFT PRETIBIAL REGION
LOCATION SIMPLE: UPPER BACK

## 2025-05-07 ASSESSMENT — LOCATION ZONE DERM
LOCATION ZONE: ARM
LOCATION ZONE: FEET
LOCATION ZONE: FACE
LOCATION ZONE: LEG
LOCATION ZONE: TRUNK

## 2025-06-02 DIAGNOSIS — J43.1 PANLOBULAR EMPHYSEMA (HCC): ICD-10-CM

## 2025-06-02 DIAGNOSIS — J45.40 MODERATE PERSISTENT ASTHMA WITHOUT COMPLICATION: Primary | ICD-10-CM

## 2025-06-03 RX ORDER — FLUTICASONE FUROATE AND VILANTEROL 100; 25 UG/1; UG/1
1 POWDER RESPIRATORY (INHALATION) DAILY
Qty: 60 EACH | Refills: 3 | Status: SHIPPED | OUTPATIENT
Start: 2025-06-03

## 2025-06-25 DIAGNOSIS — J32.9 RECURRENT SINUS INFECTIONS: Primary | ICD-10-CM

## 2025-06-25 RX ORDER — AZITHROMYCIN 500 MG/1
500 TABLET, FILM COATED ORAL DAILY
Qty: 7 TABLET | Refills: 0 | Status: SHIPPED | OUTPATIENT
Start: 2025-06-25 | End: 2025-07-02

## 2025-07-08 ENCOUNTER — NON-PROVIDER VISIT (OUTPATIENT)
Dept: INTERNAL MEDICINE | Facility: IMAGING CENTER | Age: 50
End: 2025-07-08
Payer: COMMERCIAL

## 2025-07-08 DIAGNOSIS — Z23 NEED FOR VACCINATION: Primary | ICD-10-CM

## 2025-07-08 DIAGNOSIS — J45.40 MODERATE PERSISTENT ASTHMA WITHOUT COMPLICATION: ICD-10-CM

## 2025-07-08 PROCEDURE — 90471 IMMUNIZATION ADMIN: CPT | Performed by: FAMILY MEDICINE

## 2025-07-08 PROCEDURE — 90677 PCV20 VACCINE IM: CPT | Performed by: FAMILY MEDICINE

## 2025-07-08 RX ORDER — FLUTICASONE FUROATE AND VILANTEROL 100; 25 UG/1; UG/1
1 POWDER RESPIRATORY (INHALATION) DAILY
Qty: 60 EACH | Refills: 3 | OUTPATIENT
Start: 2025-07-08

## 2025-07-15 DIAGNOSIS — I10 PRIMARY HYPERTENSION: Primary | ICD-10-CM

## 2025-07-15 DIAGNOSIS — J32.9 RHINOSINUSITIS: ICD-10-CM

## 2025-07-15 RX ORDER — LOSARTAN POTASSIUM AND HYDROCHLOROTHIAZIDE 25; 100 MG/1; MG/1
1 TABLET ORAL DAILY
Qty: 100 TABLET | Refills: 3 | Status: SHIPPED | OUTPATIENT
Start: 2025-07-15

## 2025-07-15 RX ORDER — AZELASTINE 1 MG/ML
1 SPRAY, METERED NASAL 2 TIMES DAILY
Qty: 30 ML | Refills: 3 | Status: SHIPPED | OUTPATIENT
Start: 2025-07-15

## 2025-08-04 ENCOUNTER — NON-PROVIDER VISIT (OUTPATIENT)
Dept: INTERNAL MEDICINE | Facility: IMAGING CENTER | Age: 50
End: 2025-08-04
Payer: COMMERCIAL

## 2025-08-04 ENCOUNTER — HOSPITAL ENCOUNTER (OUTPATIENT)
Facility: MEDICAL CENTER | Age: 50
End: 2025-08-04
Attending: ALLERGY & IMMUNOLOGY
Payer: COMMERCIAL

## 2025-08-04 PROCEDURE — 86317 IMMUNOASSAY INFECTIOUS AGENT: CPT | Mod: 91

## 2025-08-04 PROCEDURE — 84156 ASSAY OF PROTEIN URINE: CPT

## 2025-08-04 PROCEDURE — 84165 PROTEIN E-PHORESIS SERUM: CPT

## 2025-08-04 PROCEDURE — 84155 ASSAY OF PROTEIN SERUM: CPT

## 2025-08-06 LAB
S PN DA SERO 19F IGG SER-MCNC: 68.1 UG/ML
S PNEUM DA 1 IGG SER-MCNC: 6.29 UG/ML
S PNEUM DA 12F IGG SER-MCNC: 1.68 UG/ML
S PNEUM DA 14 IGG SER-MCNC: 6.61 UG/ML
S PNEUM DA 18C IGG SER-MCNC: 2.5 UG/ML
S PNEUM DA 23F IGG SER-MCNC: 6.23 UG/ML
S PNEUM DA 3 IGG SER-MCNC: 2.52 UG/ML
S PNEUM DA 4 IGG SER-MCNC: 8.7 UG/ML
S PNEUM DA 5 IGG SER-MCNC: 10.85 UG/ML
S PNEUM DA 6B IGG SER-MCNC: 2.81 UG/ML
S PNEUM DA 7F IGG SER-MCNC: 14.69 UG/ML
S PNEUM DA 8 IGG SER-MCNC: 4.6 UG/ML
S PNEUM DA 9N IGG SER-MCNC: 1.84 UG/ML
S PNEUM DA 9V IGG SER-MCNC: 4.81 UG/ML
S PNEUM SEROTYPE IGG SER-IMP: NORMAL

## 2025-08-07 LAB
ALBUMIN SERPL ELPH-MCNC: 4.16 G/DL (ref 3.75–5.01)
ALPHA1 GLOB SERPL ELPH-MCNC: 0.35 G/DL (ref 0.19–0.46)
ALPHA2 GLOB SERPL ELPH-MCNC: 0.67 G/DL (ref 0.48–1.05)
B-GLOBULIN SERPL ELPH-MCNC: 0.71 G/DL (ref 0.48–1.1)
EER PROT ELECT SER Q1092: ABNORMAL
GAMMA GLOB SERPL ELPH-MCNC: 1.7 G/DL (ref 0.62–1.51)
INTERPRETATION SERPL IFE-IMP: ABNORMAL
MONOCLONAL PROTEIN NL11656: ABNORMAL G/DL
PROT SERPL-MCNC: 7.6 G/DL (ref 6.3–8.2)

## 2025-08-10 LAB
ALBUMIN 24H MFR UR ELPH: 100 %
ALPHA1 GLOB 24H MFR UR ELPH: 0 %
ALPHA2 GLOB 24H MFR UR ELPH: 0 %
B-GLOBULIN 24H MFR UR ELPH: 0 %
COLLECT DURATION TIME SPEC: NORMAL HR
EER MONOCLONAL PROTEIN STUDY, 24 HOUR U Q5964: NORMAL
GAMMA GLOB 24H MFR UR ELPH: 0 %
INTERPRETATION UR IFE-IMP: NORMAL
M PROTEIN 24H MFR UR ELPH: 0 %
M PROTEIN 24H UR ELPH-MRATE: NORMAL MG/24 HRS
PROT 24H UR-MRATE: <6 MG/DL
PROT 24H UR-MRATE: NORMAL G/(24.H) (ref 40–150)
SPECIMEN VOL ?TM UR: NORMAL ML

## 2025-08-29 DIAGNOSIS — J32.9 RECURRENT SINUS INFECTIONS: Primary | ICD-10-CM
